# Patient Record
Sex: FEMALE | Race: WHITE | NOT HISPANIC OR LATINO | URBAN - METROPOLITAN AREA
[De-identification: names, ages, dates, MRNs, and addresses within clinical notes are randomized per-mention and may not be internally consistent; named-entity substitution may affect disease eponyms.]

---

## 2017-01-31 ENCOUNTER — OUTPATIENT (OUTPATIENT)
Dept: OUTPATIENT SERVICES | Facility: HOSPITAL | Age: 62
LOS: 1 days | Discharge: HOME | End: 2017-01-31
Payer: COMMERCIAL

## 2017-01-31 DIAGNOSIS — Z98.89 OTHER SPECIFIED POSTPROCEDURAL STATES: Chronic | ICD-10-CM

## 2017-01-31 DIAGNOSIS — M47.812 SPONDYLOSIS WITHOUT MYELOPATHY OR RADICULOPATHY, CERVICAL REGION: ICD-10-CM

## 2017-01-31 DIAGNOSIS — Z96.659 PRESENCE OF UNSPECIFIED ARTIFICIAL KNEE JOINT: Chronic | ICD-10-CM

## 2017-01-31 DIAGNOSIS — L05.91 PILONIDAL CYST WITHOUT ABSCESS: Chronic | ICD-10-CM

## 2017-01-31 PROCEDURE — 76000 FLUOROSCOPY <1 HR PHYS/QHP: CPT

## 2017-01-31 PROCEDURE — 64491 INJ PARAVERT F JNT C/T 2 LEV: CPT | Mod: 50

## 2017-01-31 PROCEDURE — 64492 INJ PARAVERT F JNT C/T 3 LEV: CPT | Mod: 50

## 2017-01-31 PROCEDURE — 64490 INJ PARAVERT F JNT C/T 1 LEV: CPT | Mod: 50

## 2017-03-27 ENCOUNTER — APPOINTMENT (OUTPATIENT)
Dept: DERMATOLOGY | Facility: CLINIC | Age: 62
End: 2017-03-27

## 2017-05-17 ENCOUNTER — APPOINTMENT (OUTPATIENT)
Dept: SURGERY | Facility: CLINIC | Age: 62
End: 2017-05-17

## 2017-05-17 VITALS
OXYGEN SATURATION: 96 % | HEART RATE: 78 BPM | TEMPERATURE: 97.6 F | HEIGHT: 64 IN | RESPIRATION RATE: 16 BRPM | DIASTOLIC BLOOD PRESSURE: 81 MMHG | WEIGHT: 143 LBS | SYSTOLIC BLOOD PRESSURE: 130 MMHG | BODY MASS INDEX: 24.41 KG/M2

## 2017-05-17 DIAGNOSIS — Z83.3 FAMILY HISTORY OF DIABETES MELLITUS: ICD-10-CM

## 2017-05-17 DIAGNOSIS — Z87.39 PERSONAL HISTORY OF OTHER DISEASES OF THE MUSCULOSKELETAL SYSTEM AND CONNECTIVE TISSUE: ICD-10-CM

## 2017-05-17 DIAGNOSIS — Z82.61 FAMILY HISTORY OF ARTHRITIS: ICD-10-CM

## 2017-05-17 DIAGNOSIS — Z82.49 FAMILY HISTORY OF ISCHEMIC HEART DISEASE AND OTHER DISEASES OF THE CIRCULATORY SYSTEM: ICD-10-CM

## 2017-05-19 PROBLEM — Z82.61 FAMILY HISTORY OF ARTHRITIS: Status: ACTIVE | Noted: 2017-05-17

## 2017-05-19 PROBLEM — Z87.39 HISTORY OF ARTHRITIS: Status: RESOLVED | Noted: 2017-05-17 | Resolved: 2017-05-19

## 2017-05-19 PROBLEM — Z82.49 FAMILY HISTORY OF MYOCARDIAL INFARCTION: Status: ACTIVE | Noted: 2017-05-17

## 2017-05-19 PROBLEM — Z82.49 FAMILY HISTORY OF CARDIAC DISORDER: Status: ACTIVE | Noted: 2017-05-17

## 2017-05-19 PROBLEM — Z82.49 FAMILY HISTORY OF HYPERTENSION: Status: ACTIVE | Noted: 2017-05-17

## 2017-05-19 PROBLEM — Z83.3 FAMILY HISTORY OF DIABETES MELLITUS: Status: ACTIVE | Noted: 2017-05-17

## 2017-05-22 ENCOUNTER — APPOINTMENT (OUTPATIENT)
Dept: SURGERY | Facility: CLINIC | Age: 62
End: 2017-05-22

## 2017-06-21 ENCOUNTER — APPOINTMENT (OUTPATIENT)
Dept: SURGERY | Facility: CLINIC | Age: 62
End: 2017-06-21

## 2017-06-21 ENCOUNTER — LABORATORY RESULT (OUTPATIENT)
Age: 62
End: 2017-06-21

## 2017-06-21 ENCOUNTER — RESULT REVIEW (OUTPATIENT)
Age: 62
End: 2017-06-21

## 2017-06-21 VITALS
BODY MASS INDEX: 24.25 KG/M2 | TEMPERATURE: 98.3 F | HEIGHT: 64 IN | OXYGEN SATURATION: 97 % | WEIGHT: 142.03 LBS | DIASTOLIC BLOOD PRESSURE: 78 MMHG | HEART RATE: 78 BPM | RESPIRATION RATE: 16 BRPM | SYSTOLIC BLOOD PRESSURE: 121 MMHG

## 2017-06-22 RX ORDER — ORPHENADRINE CITRATE 100 MG/1
100 TABLET, EXTENDED RELEASE ORAL
Qty: 60 | Refills: 0 | Status: ACTIVE | COMMUNITY
Start: 2016-12-23

## 2017-06-22 RX ORDER — ASPIRIN 325 MG
TABLET ORAL
Refills: 0 | Status: ACTIVE | COMMUNITY

## 2017-06-22 RX ORDER — IBUPROFEN 600 MG/1
600 TABLET, FILM COATED ORAL
Qty: 60 | Refills: 0 | Status: ACTIVE | COMMUNITY
Start: 2017-04-26

## 2017-06-22 RX ORDER — QUETIAPINE FUMARATE 25 MG/1
25 TABLET ORAL
Qty: 120 | Refills: 0 | Status: ACTIVE | COMMUNITY
Start: 2017-02-28

## 2017-06-22 RX ORDER — SIMVASTATIN 20 MG/1
20 TABLET, FILM COATED ORAL
Qty: 90 | Refills: 0 | Status: ACTIVE | COMMUNITY
Start: 2017-05-01

## 2017-06-28 ENCOUNTER — APPOINTMENT (OUTPATIENT)
Dept: SURGERY | Facility: CLINIC | Age: 62
End: 2017-06-28

## 2017-06-28 VITALS
OXYGEN SATURATION: 96 % | TEMPERATURE: 97.9 F | HEIGHT: 64 IN | BODY MASS INDEX: 24.24 KG/M2 | RESPIRATION RATE: 16 BRPM | HEART RATE: 70 BPM | DIASTOLIC BLOOD PRESSURE: 85 MMHG | SYSTOLIC BLOOD PRESSURE: 124 MMHG | WEIGHT: 142 LBS

## 2017-06-28 DIAGNOSIS — L72.0 EPIDERMAL CYST: ICD-10-CM

## 2017-06-29 PROBLEM — L72.0 EPIDERMOID CYST: Status: ACTIVE | Noted: 2017-05-19

## 2017-10-17 ENCOUNTER — OUTPATIENT (OUTPATIENT)
Dept: OUTPATIENT SERVICES | Facility: HOSPITAL | Age: 62
LOS: 1 days | End: 2017-10-17
Payer: COMMERCIAL

## 2017-10-17 DIAGNOSIS — L05.91 PILONIDAL CYST WITHOUT ABSCESS: Chronic | ICD-10-CM

## 2017-10-17 DIAGNOSIS — Z98.89 OTHER SPECIFIED POSTPROCEDURAL STATES: Chronic | ICD-10-CM

## 2017-10-17 DIAGNOSIS — M47.816 SPONDYLOSIS WITHOUT MYELOPATHY OR RADICULOPATHY, LUMBAR REGION: ICD-10-CM

## 2017-10-17 DIAGNOSIS — Z96.659 PRESENCE OF UNSPECIFIED ARTIFICIAL KNEE JOINT: Chronic | ICD-10-CM

## 2017-10-17 PROCEDURE — 76000 FLUOROSCOPY <1 HR PHYS/QHP: CPT

## 2017-10-17 PROCEDURE — 64493 INJ PARAVERT F JNT L/S 1 LEV: CPT | Mod: 50

## 2017-10-17 PROCEDURE — 64495 INJ PARAVERT F JNT L/S 3 LEV: CPT | Mod: 50

## 2017-10-17 PROCEDURE — 64494 INJ PARAVERT F JNT L/S 2 LEV: CPT | Mod: 50

## 2017-10-18 ENCOUNTER — TRANSCRIPTION ENCOUNTER (OUTPATIENT)
Age: 62
End: 2017-10-18

## 2017-11-16 ENCOUNTER — INPATIENT (INPATIENT)
Facility: HOSPITAL | Age: 62
LOS: 0 days | Discharge: HOME | DRG: 287 | End: 2017-11-17
Attending: INTERNAL MEDICINE | Admitting: HOSPITALIST
Payer: MEDICARE

## 2017-11-16 VITALS
SYSTOLIC BLOOD PRESSURE: 137 MMHG | OXYGEN SATURATION: 97 % | RESPIRATION RATE: 20 BRPM | WEIGHT: 138.01 LBS | HEART RATE: 84 BPM | DIASTOLIC BLOOD PRESSURE: 84 MMHG | TEMPERATURE: 98 F | HEIGHT: 64 IN

## 2017-11-16 DIAGNOSIS — I24.9 ACUTE ISCHEMIC HEART DISEASE, UNSPECIFIED: ICD-10-CM

## 2017-11-16 DIAGNOSIS — Z98.89 OTHER SPECIFIED POSTPROCEDURAL STATES: Chronic | ICD-10-CM

## 2017-11-16 DIAGNOSIS — I20.0 UNSTABLE ANGINA: ICD-10-CM

## 2017-11-16 DIAGNOSIS — Z96.659 PRESENCE OF UNSPECIFIED ARTIFICIAL KNEE JOINT: Chronic | ICD-10-CM

## 2017-11-16 DIAGNOSIS — L05.91 PILONIDAL CYST WITHOUT ABSCESS: Chronic | ICD-10-CM

## 2017-11-16 LAB
ALBUMIN SERPL ELPH-MCNC: 4.7 G/DL — SIGNIFICANT CHANGE UP (ref 3.3–5.2)
ALP SERPL-CCNC: 85 U/L — SIGNIFICANT CHANGE UP (ref 40–120)
ALT FLD-CCNC: 12 U/L — SIGNIFICANT CHANGE UP
ANION GAP SERPL CALC-SCNC: 12 MMOL/L — SIGNIFICANT CHANGE UP (ref 5–17)
AST SERPL-CCNC: 20 U/L — SIGNIFICANT CHANGE UP
BASOPHILS # BLD AUTO: 0 K/UL — SIGNIFICANT CHANGE UP (ref 0–0.2)
BASOPHILS NFR BLD AUTO: 0.3 % — SIGNIFICANT CHANGE UP (ref 0–2)
BILIRUB SERPL-MCNC: 0.2 MG/DL — LOW (ref 0.4–2)
BUN SERPL-MCNC: 23 MG/DL — HIGH (ref 8–20)
CALCIUM SERPL-MCNC: 9.6 MG/DL — SIGNIFICANT CHANGE UP (ref 8.6–10.2)
CHLORIDE SERPL-SCNC: 102 MMOL/L — SIGNIFICANT CHANGE UP (ref 98–107)
CK SERPL-CCNC: 92 U/L — SIGNIFICANT CHANGE UP (ref 25–170)
CO2 SERPL-SCNC: 25 MMOL/L — SIGNIFICANT CHANGE UP (ref 22–29)
CREAT SERPL-MCNC: 0.93 MG/DL — SIGNIFICANT CHANGE UP (ref 0.5–1.3)
D DIMER BLD IA.RAPID-MCNC: <150 NG/ML DDU — SIGNIFICANT CHANGE UP
EOSINOPHIL # BLD AUTO: 0.1 K/UL — SIGNIFICANT CHANGE UP (ref 0–0.5)
EOSINOPHIL NFR BLD AUTO: 1.5 % — SIGNIFICANT CHANGE UP (ref 0–6)
GLUCOSE SERPL-MCNC: 156 MG/DL — HIGH (ref 70–115)
HCT VFR BLD CALC: 39.4 % — SIGNIFICANT CHANGE UP (ref 37–47)
HGB BLD-MCNC: 13.3 G/DL — SIGNIFICANT CHANGE UP (ref 12–16)
LYMPHOCYTES # BLD AUTO: 2.7 K/UL — SIGNIFICANT CHANGE UP (ref 1–4.8)
LYMPHOCYTES # BLD AUTO: 41.3 % — SIGNIFICANT CHANGE UP (ref 20–55)
MCHC RBC-ENTMCNC: 31.4 PG — HIGH (ref 27–31)
MCHC RBC-ENTMCNC: 33.8 G/DL — SIGNIFICANT CHANGE UP (ref 32–36)
MCV RBC AUTO: 92.9 FL — SIGNIFICANT CHANGE UP (ref 81–99)
MONOCYTES # BLD AUTO: 0.3 K/UL — SIGNIFICANT CHANGE UP (ref 0–0.8)
MONOCYTES NFR BLD AUTO: 4.9 % — SIGNIFICANT CHANGE UP (ref 3–10)
NEUTROPHILS # BLD AUTO: 3.4 K/UL — SIGNIFICANT CHANGE UP (ref 1.8–8)
NEUTROPHILS NFR BLD AUTO: 51.8 % — SIGNIFICANT CHANGE UP (ref 37–73)
NT-PROBNP SERPL-SCNC: <5 PG/ML — SIGNIFICANT CHANGE UP (ref 0–300)
PLATELET # BLD AUTO: 209 K/UL — SIGNIFICANT CHANGE UP (ref 150–400)
POTASSIUM SERPL-MCNC: 4.3 MMOL/L — SIGNIFICANT CHANGE UP (ref 3.5–5.3)
POTASSIUM SERPL-SCNC: 4.3 MMOL/L — SIGNIFICANT CHANGE UP (ref 3.5–5.3)
PROT SERPL-MCNC: 7.2 G/DL — SIGNIFICANT CHANGE UP (ref 6.6–8.7)
RBC # BLD: 4.24 M/UL — LOW (ref 4.4–5.2)
RBC # FLD: 13.1 % — SIGNIFICANT CHANGE UP (ref 11–15.6)
SODIUM SERPL-SCNC: 139 MMOL/L — SIGNIFICANT CHANGE UP (ref 135–145)
TROPONIN T SERPL-MCNC: <0.01 NG/ML — SIGNIFICANT CHANGE UP (ref 0–0.06)
TROPONIN T SERPL-MCNC: <0.01 NG/ML — SIGNIFICANT CHANGE UP (ref 0–0.06)
WBC # BLD: 6.5 K/UL — SIGNIFICANT CHANGE UP (ref 4.8–10.8)
WBC # FLD AUTO: 6.5 K/UL — SIGNIFICANT CHANGE UP (ref 4.8–10.8)

## 2017-11-16 PROCEDURE — 71010: CPT | Mod: 26

## 2017-11-16 PROCEDURE — 99223 1ST HOSP IP/OBS HIGH 75: CPT | Mod: GC

## 2017-11-16 PROCEDURE — 93010 ELECTROCARDIOGRAM REPORT: CPT

## 2017-11-16 PROCEDURE — 99285 EMERGENCY DEPT VISIT HI MDM: CPT

## 2017-11-16 RX ORDER — ASPIRIN/CALCIUM CARB/MAGNESIUM 324 MG
325 TABLET ORAL DAILY
Qty: 0 | Refills: 0 | Status: DISCONTINUED | OUTPATIENT
Start: 2017-11-16 | End: 2017-11-17

## 2017-11-16 RX ORDER — SODIUM CHLORIDE 9 MG/ML
3 INJECTION INTRAMUSCULAR; INTRAVENOUS; SUBCUTANEOUS ONCE
Qty: 0 | Refills: 0 | Status: COMPLETED | OUTPATIENT
Start: 2017-11-16 | End: 2017-11-16

## 2017-11-16 RX ORDER — ATORVASTATIN CALCIUM 80 MG/1
80 TABLET, FILM COATED ORAL AT BEDTIME
Qty: 0 | Refills: 0 | Status: DISCONTINUED | OUTPATIENT
Start: 2017-11-16 | End: 2017-11-17

## 2017-11-16 RX ADMIN — Medication 325 MILLIGRAM(S): at 22:49

## 2017-11-16 RX ADMIN — ATORVASTATIN CALCIUM 80 MILLIGRAM(S): 80 TABLET, FILM COATED ORAL at 22:50

## 2017-11-16 RX ADMIN — SODIUM CHLORIDE 3 MILLILITER(S): 9 INJECTION INTRAMUSCULAR; INTRAVENOUS; SUBCUTANEOUS at 17:15

## 2017-11-16 NOTE — H&P ADULT - HISTORY OF PRESENT ILLNESS
63yo F w/PMH HLD, Anxiety, TIA, FH CAD Presents w/abnormal EKG, SOB and Palpitations. Pt has had palpitations and SOB for at least 2 years 63yo F w/PMHx HLD, Anxiety, questionable TIA, Bell's palsy, chronic pain, DJD awaiting elective C-spine fusion surgery, s/p TKR  Presents w/abnormal EKG, SOB and Palpitations. Pt has had palpitations and SOB for at least 2 years 61yo F w/PMHx HLD, Anxiety, questionable TIA, Bell's palsy, chronic pain, DJD awaiting elective C-spine fusion surgery, s/p TKR  Presents w/abnormal EKG, SOB and Palpitations. Pt has had palpitations and SOB for at least 2 years. Two weeks ago she began to notice a worsening of her baseline SOB. She has palpitations every morning which is relieved with Xanax, and has shortness of breath while at rest and with exertion. She also has a pain in her jaw and arms when having the palpitation. She had had a nuclear stress test planned a year previous with Dr. Alarcon, but did not go. She has also worn halter monitor two times in the past without finding anything significant, and a US Carotid w/ Dr. Julius Schmitt. 63yo F w/PMHx HLD, Anxiety, questionable TIA, Bell's palsy, chronic pain, DJD awaiting elective C-spine fusion surgery, s/p TKR  Presents w/abnormal EKG, SOB and Palpitations. Pt has had palpitations and SOB for at least 2 years. Two weeks ago she began to notice a worsening of her baseline SOB. She has palpitations every morning which is relieved with Xanax, and has shortness of breath while at rest and with exertion. She also has a pain in her jaw and arms when having the palpitation. She had had a nuclear stress test planned a year previous with Dr. Alarcon, but did not go. She has also worn halter monitor two times in the past without finding anything significant, and a US Carotid w/ Dr. Julius Schmitt. She is scheduled for follow up with Dr. Alarcon out patient. Pt has back and neck pain. She has a cervical spine fusion scheduled for early December. Pt denies nausea, abdominal pain, diarrhea, urinary incontinence, dysuria, leg swelling, rashes.

## 2017-11-16 NOTE — ED PROVIDER NOTE - PSH
H/O total knee replacement  2015  Pilonidal cyst    S/P dilatation and curettage  x2  S/P knee surgery  right x5

## 2017-11-16 NOTE — H&P ADULT - PROBLEM SELECTOR PLAN 1
Trops were negative x 2, CK negative x 1.  Her cardiologist was contacted and he compared her EKG with previous ones and recommends discharge and follow up outpatient.  Increase dose of statin  F/U AM Trop, CK, EKG

## 2017-11-16 NOTE — ED PROVIDER NOTE - OBJECTIVE STATEMENT
61 y/o female with h/o hyperlipidemia and fh cad states she was well until about 2 weeks ago she noted an increase in a baseline sob and she feels sob at rest and more with minimal exertion no fever or chills and no recent immobilization pt says she went to pmd and told ecg abnl and she needs a stress test and she also says when she went to her cardiologist Dr Alarcon 1 year ago he wanted her to have a stress echo but she did not go and she called him and he told her to come in as she needs a stress echo no cp now pt still smokes 4 cigarettes a day

## 2017-11-16 NOTE — H&P ADULT - NSHPPHYSICALEXAM_GEN_ALL_CORE
Vital Signs Last 24 Hrs  T(C): 36.4 (17 Nov 2017 01:44), Max: 36.5 (16 Nov 2017 15:13)  T(F): 97.6 (17 Nov 2017 01:44), Max: 97.7 (16 Nov 2017 15:13)  HR: 76 (17 Nov 2017 01:44) (76 - 84)  BP: 125/59 (17 Nov 2017 01:44) (125/59 - 137/84)  BP(mean): --  RR: 21 (17 Nov 2017 01:44) (20 - 21)  SpO2: 97% (17 Nov 2017 01:44) (97% - 97%)    GENERAL: NAD, well-groomed, well-developed  HEAD:  Atraumatic, Normocephalic  EYES: EOMI, PERRLA, conjunctiva and sclera clear  ENMT: No tonsillar erythema, exudates, or enlargement; Moist mucous membranes, Good dentition, No lesions  NECK: Tender to palpation, No JVD, Normal thyroid  RESPIRATORY: Clear to auscultation bilaterally; No rales, rhonchi, wheezing, or rubs  CARDIOVASCULAR: Regular rate; No murmurs, rubs, or gallops  GI: Soft, Nontender, Nondistended; Bowel sounds present  EXTREMITIES:  2+ Peripheral Pulses, No clubbing, cyanosis, or edema, FROM  LYMPH: No cervical, submandibular, supraclavicular, axial or femoral lymphadenopathy noted  NERVOUS SYSTEM:  Alert & Oriented X3, Good concentration; Motor Strength 5/5 B/L upper and lower extremities; DTRs 2+ intact and symmetric  SKIN: No suspicious rashes or lesions noted Vital Signs Last 24 Hrs  T(C): 36.4 (17 Nov 2017 01:44), Max: 36.5 (16 Nov 2017 15:13)  T(F): 97.6 (17 Nov 2017 01:44), Max: 97.7 (16 Nov 2017 15:13)  HR: 76 (17 Nov 2017 01:44) (76 - 84)  BP: 125/59 (17 Nov 2017 01:44) (125/59 - 137/84)  BP(mean): --  RR: 21 (17 Nov 2017 01:44) (20 - 21)  SpO2: 97% (17 Nov 2017 01:44) (97% - 97%)    GENERAL: NAD, well-groomed, well-developed  HEAD:  Atraumatic, Normocephalic  EYES: EOMI, PERRLA, conjunctiva and sclera clear  ENMT: No tonsillar erythema, exudates, or enlargement; Moist mucous membranes, Good dentition, No lesions  NECK: Tender to palpation, No JVD, Normal thyroid  RESPIRATORY: Clear to auscultation bilaterally; No rales, rhonchi, wheezing, or rubs  CARDIOVASCULAR: Regular rate; No murmurs, rubs, or gallops  GI: Soft, Nontender, Nondistended; Bowel sounds present  EXTREMITIES:  FROM, 2+ Peripheral Pulses, No clubbing, cyanosis, or edema  LYMPH: No cervical, submandibular, supraclavicular, axial or femoral lymphadenopathy noted  NERVOUS SYSTEM:  Alert & Oriented X3, Good concentration; Motor Strength 5/5 B/L upper and lower extremities; DTRs 2+ intact and symmetric  SKIN: No suspicious rashes or lesions noted
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
## 2017-11-16 NOTE — H&P ADULT - NSHPSOCIALHISTORY_GEN_ALL_CORE
Pt is current smoker, 3-4 cigarettes a day. 30 pack years She drinks 3 drinks every Friday (CAGE negative), She denies illicit drugs.

## 2017-11-16 NOTE — H&P ADULT - ASSESSMENT
r/o ACS 63yo F w/PMHx HLD, Anxiety, questionable TIA, Bell's palsy, chronic pain, DJD awaiting elective C-spine fusion surgery, s/p TKR.  Presents w/abnormal EKG, SOB and Palpitations. Pt has had palpitations and SOB for at least 2 years. Two weeks ago she began to notice a worsening of her baseline SOB. She has palpitations every morning which is relieved with Xanax, and has shortness of breath while at rest and with exertion. She also has a pain in her jaw and arms when having the palpitation. Admitted for R/O ACS.

## 2017-11-16 NOTE — H&P ADULT - FAMILY HISTORY
Father  Still living? No  Family history of coronary artery disease, Age at diagnosis: 61-70     Sibling  Still living? Yes, Estimated age: 51-60  Family history of diabetes mellitus, Age at diagnosis: 51-60     Uncle  Still living? Unknown  Family history of diabetes mellitus, Age at diagnosis: Age Unknown     Sibling  Still living? Yes, Estimated age: 51-60  Family history of diabetes mellitus, Age at diagnosis: 51-60

## 2017-11-17 ENCOUNTER — TRANSCRIPTION ENCOUNTER (OUTPATIENT)
Age: 62
End: 2017-11-17

## 2017-11-17 VITALS
SYSTOLIC BLOOD PRESSURE: 131 MMHG | RESPIRATION RATE: 18 BRPM | HEART RATE: 69 BPM | DIASTOLIC BLOOD PRESSURE: 61 MMHG | OXYGEN SATURATION: 99 %

## 2017-11-17 DIAGNOSIS — Z29.9 ENCOUNTER FOR PROPHYLACTIC MEASURES, UNSPECIFIED: ICD-10-CM

## 2017-11-17 DIAGNOSIS — E86.0 DEHYDRATION: ICD-10-CM

## 2017-11-17 DIAGNOSIS — M54.2 CERVICALGIA: ICD-10-CM

## 2017-11-17 DIAGNOSIS — F41.9 ANXIETY DISORDER, UNSPECIFIED: ICD-10-CM

## 2017-11-17 DIAGNOSIS — E78.5 HYPERLIPIDEMIA, UNSPECIFIED: ICD-10-CM

## 2017-11-17 DIAGNOSIS — M54.5 LOW BACK PAIN: ICD-10-CM

## 2017-11-17 DIAGNOSIS — R73.9 HYPERGLYCEMIA, UNSPECIFIED: ICD-10-CM

## 2017-11-17 DIAGNOSIS — I24.9 ACUTE ISCHEMIC HEART DISEASE, UNSPECIFIED: ICD-10-CM

## 2017-11-17 LAB
ALBUMIN SERPL ELPH-MCNC: 4.1 G/DL — SIGNIFICANT CHANGE UP (ref 3.3–5.2)
ALP SERPL-CCNC: 84 U/L — SIGNIFICANT CHANGE UP (ref 40–120)
ALT FLD-CCNC: 10 U/L — SIGNIFICANT CHANGE UP
ANION GAP SERPL CALC-SCNC: 14 MMOL/L — SIGNIFICANT CHANGE UP (ref 5–17)
AST SERPL-CCNC: 17 U/L — SIGNIFICANT CHANGE UP
BASOPHILS # BLD AUTO: 0 K/UL — SIGNIFICANT CHANGE UP (ref 0–0.2)
BASOPHILS NFR BLD AUTO: 0.5 % — SIGNIFICANT CHANGE UP (ref 0–2)
BILIRUB SERPL-MCNC: 0.3 MG/DL — LOW (ref 0.4–2)
BUN SERPL-MCNC: 20 MG/DL — SIGNIFICANT CHANGE UP (ref 8–20)
CALCIUM SERPL-MCNC: 9.2 MG/DL — SIGNIFICANT CHANGE UP (ref 8.6–10.2)
CHLORIDE SERPL-SCNC: 105 MMOL/L — SIGNIFICANT CHANGE UP (ref 98–107)
CHOLEST SERPL-MCNC: 162 MG/DL — SIGNIFICANT CHANGE UP (ref 110–199)
CK SERPL-CCNC: 75 U/L — SIGNIFICANT CHANGE UP (ref 25–170)
CO2 SERPL-SCNC: 23 MMOL/L — SIGNIFICANT CHANGE UP (ref 22–29)
CREAT SERPL-MCNC: 0.79 MG/DL — SIGNIFICANT CHANGE UP (ref 0.5–1.3)
EOSINOPHIL # BLD AUTO: 0.1 K/UL — SIGNIFICANT CHANGE UP (ref 0–0.5)
EOSINOPHIL NFR BLD AUTO: 2.1 % — SIGNIFICANT CHANGE UP (ref 0–6)
GLUCOSE SERPL-MCNC: 107 MG/DL — SIGNIFICANT CHANGE UP (ref 70–115)
HBA1C BLD-MCNC: 5.2 % — SIGNIFICANT CHANGE UP (ref 4–5.6)
HCT VFR BLD CALC: 39.3 % — SIGNIFICANT CHANGE UP (ref 37–47)
HDLC SERPL-MCNC: 55 MG/DL — LOW
HGB BLD-MCNC: 13.2 G/DL — SIGNIFICANT CHANGE UP (ref 12–16)
LIPID PNL WITH DIRECT LDL SERPL: 69 MG/DL — SIGNIFICANT CHANGE UP
LYMPHOCYTES # BLD AUTO: 2.9 K/UL — SIGNIFICANT CHANGE UP (ref 1–4.8)
LYMPHOCYTES # BLD AUTO: 49.7 % — SIGNIFICANT CHANGE UP (ref 20–55)
MAGNESIUM SERPL-MCNC: 2.3 MG/DL — SIGNIFICANT CHANGE UP (ref 1.6–2.6)
MCHC RBC-ENTMCNC: 31.4 PG — HIGH (ref 27–31)
MCHC RBC-ENTMCNC: 33.6 G/DL — SIGNIFICANT CHANGE UP (ref 32–36)
MCV RBC AUTO: 93.6 FL — SIGNIFICANT CHANGE UP (ref 81–99)
MONOCYTES # BLD AUTO: 0.3 K/UL — SIGNIFICANT CHANGE UP (ref 0–0.8)
MONOCYTES NFR BLD AUTO: 4.8 % — SIGNIFICANT CHANGE UP (ref 3–10)
NEUTROPHILS # BLD AUTO: 2.5 K/UL — SIGNIFICANT CHANGE UP (ref 1.8–8)
NEUTROPHILS NFR BLD AUTO: 42.6 % — SIGNIFICANT CHANGE UP (ref 37–73)
PHOSPHATE SERPL-MCNC: 4.2 MG/DL — SIGNIFICANT CHANGE UP (ref 2.4–4.7)
PLATELET # BLD AUTO: 196 K/UL — SIGNIFICANT CHANGE UP (ref 150–400)
POTASSIUM SERPL-MCNC: 4.1 MMOL/L — SIGNIFICANT CHANGE UP (ref 3.5–5.3)
POTASSIUM SERPL-SCNC: 4.1 MMOL/L — SIGNIFICANT CHANGE UP (ref 3.5–5.3)
PROT SERPL-MCNC: 6.7 G/DL — SIGNIFICANT CHANGE UP (ref 6.6–8.7)
RBC # BLD: 4.2 M/UL — LOW (ref 4.4–5.2)
RBC # FLD: 13.3 % — SIGNIFICANT CHANGE UP (ref 11–15.6)
SODIUM SERPL-SCNC: 142 MMOL/L — SIGNIFICANT CHANGE UP (ref 135–145)
TOTAL CHOLESTEROL/HDL RATIO MEASUREMENT: 3 RATIO — LOW (ref 3.3–7.1)
TRIGL SERPL-MCNC: 189 MG/DL — SIGNIFICANT CHANGE UP (ref 10–200)
TROPONIN T SERPL-MCNC: <0.01 NG/ML — SIGNIFICANT CHANGE UP (ref 0–0.06)
WBC # BLD: 5.8 K/UL — SIGNIFICANT CHANGE UP (ref 4.8–10.8)
WBC # FLD AUTO: 5.8 K/UL — SIGNIFICANT CHANGE UP (ref 4.8–10.8)

## 2017-11-17 PROCEDURE — 84484 ASSAY OF TROPONIN QUANT: CPT

## 2017-11-17 PROCEDURE — 80061 LIPID PANEL: CPT

## 2017-11-17 PROCEDURE — 80053 COMPREHEN METABOLIC PANEL: CPT

## 2017-11-17 PROCEDURE — C1887: CPT

## 2017-11-17 PROCEDURE — 83036 HEMOGLOBIN GLYCOSYLATED A1C: CPT

## 2017-11-17 PROCEDURE — 93005 ELECTROCARDIOGRAM TRACING: CPT

## 2017-11-17 PROCEDURE — 93458 L HRT ARTERY/VENTRICLE ANGIO: CPT

## 2017-11-17 PROCEDURE — C1769: CPT

## 2017-11-17 PROCEDURE — C1894: CPT

## 2017-11-17 PROCEDURE — 71045 X-RAY EXAM CHEST 1 VIEW: CPT

## 2017-11-17 PROCEDURE — 83735 ASSAY OF MAGNESIUM: CPT

## 2017-11-17 PROCEDURE — 99239 HOSP IP/OBS DSCHRG MGMT >30: CPT

## 2017-11-17 PROCEDURE — 85379 FIBRIN DEGRADATION QUANT: CPT

## 2017-11-17 PROCEDURE — 85027 COMPLETE CBC AUTOMATED: CPT

## 2017-11-17 PROCEDURE — 36415 COLL VENOUS BLD VENIPUNCTURE: CPT

## 2017-11-17 PROCEDURE — 83880 ASSAY OF NATRIURETIC PEPTIDE: CPT

## 2017-11-17 PROCEDURE — 84100 ASSAY OF PHOSPHORUS: CPT

## 2017-11-17 PROCEDURE — 99285 EMERGENCY DEPT VISIT HI MDM: CPT | Mod: 25

## 2017-11-17 PROCEDURE — 82550 ASSAY OF CK (CPK): CPT

## 2017-11-17 PROCEDURE — 99152 MOD SED SAME PHYS/QHP 5/>YRS: CPT

## 2017-11-17 PROCEDURE — 93458 L HRT ARTERY/VENTRICLE ANGIO: CPT | Mod: 26

## 2017-11-17 RX ORDER — SENNA PLUS 8.6 MG/1
2 TABLET ORAL AT BEDTIME
Qty: 0 | Refills: 0 | Status: DISCONTINUED | OUTPATIENT
Start: 2017-11-17 | End: 2017-11-17

## 2017-11-17 RX ORDER — IBUPROFEN 200 MG
600 TABLET ORAL EVERY 6 HOURS
Qty: 0 | Refills: 0 | Status: DISCONTINUED | OUTPATIENT
Start: 2017-11-17 | End: 2017-11-17

## 2017-11-17 RX ORDER — DOCUSATE SODIUM 100 MG
100 CAPSULE ORAL DAILY
Qty: 0 | Refills: 0 | Status: DISCONTINUED | OUTPATIENT
Start: 2017-11-17 | End: 2017-11-17

## 2017-11-17 RX ORDER — ALPRAZOLAM 0.25 MG
0.25 TABLET ORAL EVERY 8 HOURS
Qty: 0 | Refills: 0 | Status: DISCONTINUED | OUTPATIENT
Start: 2017-11-17 | End: 2017-11-17

## 2017-11-17 RX ORDER — OXYCODONE HYDROCHLORIDE 5 MG/1
15 TABLET ORAL EVERY 4 HOURS
Qty: 0 | Refills: 0 | Status: DISCONTINUED | OUTPATIENT
Start: 2017-11-17 | End: 2017-11-17

## 2017-11-17 RX ORDER — NICOTINE POLACRILEX 2 MG
1 GUM BUCCAL DAILY
Qty: 0 | Refills: 0 | Status: DISCONTINUED | OUTPATIENT
Start: 2017-11-17 | End: 2017-11-17

## 2017-11-17 RX ORDER — QUETIAPINE FUMARATE 200 MG/1
100 TABLET, FILM COATED ORAL AT BEDTIME
Qty: 0 | Refills: 0 | Status: DISCONTINUED | OUTPATIENT
Start: 2017-11-17 | End: 2017-11-17

## 2017-11-17 RX ADMIN — Medication 325 MILLIGRAM(S): at 09:57

## 2017-11-17 RX ADMIN — Medication 1 PATCH: at 09:57

## 2017-11-17 RX ADMIN — Medication 0.25 MILLIGRAM(S): at 05:56
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
## 2017-11-17 NOTE — DISCHARGE NOTE ADULT - MEDICATION SUMMARY - MEDICATIONS TO TAKE
I will START or STAY ON the medications listed below when I get home from the hospital:    oxyCODONE 15 mg oral tablet  -- 1 tab(s) by mouth every 3 hours, As needed, Severe Pain  -- Indication: For Pain    Aspir 81 81 mg oral tablet  -- 1 tab(s) by mouth 2 times a day, for 28 days total  -- Indication: For Heart    Motrin 800 mg oral tablet  -- 1 tab(s) by mouth 3 times a day, As Needed  -- Indication: For Pain    simvastatin 40 mg oral tablet  -- 1 tab(s) by mouth once a day (at bedtime)  -- Indication: For Chol    SEROquel 25 mg oral tablet  -- 4 tab(s) by mouth once a day (at bedtime)  -- Indication: For sleep    Xanax  -- 0.25 mg PO tid PRN anxiety  -- Indication: For Anxiety

## 2017-11-17 NOTE — DISCHARGE NOTE ADULT - CARE PROVIDERS DIRECT ADDRESSES
,DirectAddress_Unknown ,DirectAddress_Unknown,[email or web address]

## 2017-11-17 NOTE — DISCHARGE NOTE ADULT - OTHER SIGNIFICANT FINDINGS
Troponin T, Serum: <0.01ng/mL (11.17.17 @ 06:23)  Troponin T, Serum: <0.01 ng/mL (11.16.17 @ 23:13)  Troponin T, Serum: <0.01: Reference Interval for Troponin T  Less than 0.06 ng/mL - includes the 99th percentile of a healthy  population at a method C.V. of 10% or less.  NOTE: Troponin T is measured by the Roche CLIA method and these  results are not interchangable with Troponin I results since they are  different assays with different reference intervals. ng/mL (11.16.17 @ 17:30) Troponin T, Serum: <0.01 ng/mL (11.17.17 @ 06:23)  Troponin T, Serum: <0.01 ng/mL (11.16.17 @ 23:13)  Troponin T, Serum: <0.01 ng/mL (11.16.17 @ 17:30)  Lipid Profile in AM (11.17.17 @ 06:23)    Total Cholesterol/HDL Ratio Measurement: 3.0 Ratio    Cholesterol, Serum: 162 mg/dL    Triglycerides, Serum: 189 mg/dL    HDL Cholesterol, Serum: 55 mg/dL    Direct LDL: 69 mg/dL  Creatine Kinase, Serum: 75 U/L (11.17.17 @ 06:23)  Serum Pro-Brain Natriuretic Peptide: <5 pg/mL (11.16.17 @ 17:30)  D-Dimer Assay, Quantitative: <150 ng/mL DDU (11.16.17 @ 17:30)    < from: Xray Chest 1 View AP/PA. (11.16.17 @ 17:43) >  FINDINGS:  Single frontal view of the chest demonstrates the lungs to be clear. The   cardiomediastinal silhouette is normal. No acute osseous abnormalities.   Overlying EKG leads and wires are noted. Atrial septal closure device is   noted.    IMPRESSION: No acute cardiopulmonary disease process.  < end of copied text >

## 2017-11-17 NOTE — CONSULT NOTE ADULT - ASSESSMENT
Assessment and Plan:   · Assessment		  63yo F w/PMHx HLD, Hx of PFO and closure; Strong FH for early CAD with addtl risk factors-plus Anxiety, questionable TIA, Bell's palsy, chronic pain, DJD awaiting elective C-spine fusion surgery, s/p TKR.  Presents w/abnormal EKG, SOB and Palpitations. Pt has had palpitations and SOB for at least 2 years. Two weeks ago she began to notice a worsening of her baseline SOB. She has palpitations every morning which is relieved with Xanax, and has shortness of breath while at rest and with exertion. She also has a pain in her jaw and arms when having the palpitation. Admitted for R/O ACS.     PLAN:  1.  Discussed recommendations for staying in hospital and undergoing cardiac catheterization today.  2.  Pt. agreeable to undergo cath.  3.  Discussed case w/ Dr Hernández today.
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
## 2017-11-17 NOTE — DISCHARGE NOTE ADULT - CARE PLAN
Principal Discharge DX:	Atypical chest pain  Goal:	smoking cessation, optimize cardiac health  Instructions for follow-up, activity and diet:	Restricted use with no heavy lifting of affected arm for 48 hours.  No submerging the arm in water for 48 hours.  You may start showering today.  Call your doctor for any bleeding, swelling, loss of sensation in the hand or fingers, or fingers turning blue.  If heavy bleeding or large lumps form, hold pressure at the spot and come to the Emergency [facility]  [place] Principal Discharge DX:	Atypical chest pain  Goal:	smoking cessation, optimize cardiac health  Instructions for follow-up, activity and diet:	Restricted use with no heavy lifting of affected arm for 48 hours.  No submerging the arm in water for 48 hours.  You may start showering today.  Call your doctor for any bleeding, swelling, loss of sensation in the hand or fingers, or fingers turning blue.  If heavy bleeding or large lumps form, hold pressure at the spot and come to the Emergency Room.  Follow up with cardiology as an outpatient and also with pulmonology to rule out a lung origin of your shortness of breath given your history of smoking.  Continue taking cholesterol medication.  Secondary Diagnosis:	Chronic back pain  Instructions for follow-up, activity and diet:	Continue taking home medications Oxycodone and Ibuprofen.  Secondary Diagnosis:	Chronic neck pain  Instructions for follow-up, activity and diet:	Continue taking home medications Oxycodone and Ibuprofen. Follow up with your appointments for the upcoming procedure.  Secondary Diagnosis:	Anxiety  Instructions for follow-up, activity and diet:	Continue taking your medications: Seroquel and Xanax and follow up with your outpatient provider. Principal Discharge DX:	Atypical chest pain  Goal:	Found to have Nonobstructive Coronary Artery Disease. smoking cessation, optimize cardiac health  Instructions for follow-up, activity and diet:	Restricted use with no heavy lifting of affected arm for 48 hours.  No submerging the arm in water for 48 hours.  You may start showering today.  Call your doctor for any bleeding, swelling, loss of sensation in the hand or fingers, or fingers turning blue.  If heavy bleeding or large lumps form, hold pressure at the spot and come to the Emergency Room.  Follow up with cardiology as an outpatient and also with pulmonology to rule out a lung origin of your shortness of breath given your history of smoking.  Continue taking cholesterol medication.  Secondary Diagnosis:	Chronic back pain  Instructions for follow-up, activity and diet:	Continue taking home medications Oxycodone and Ibuprofen.  Secondary Diagnosis:	Chronic neck pain  Instructions for follow-up, activity and diet:	Continue taking home medications Oxycodone and Ibuprofen. Follow up with your appointments for the upcoming procedure.  Secondary Diagnosis:	Anxiety  Instructions for follow-up, activity and diet:	Continue taking your medications: Seroquel and Xanax and follow up with your outpatient provider.  Secondary Diagnosis:	SOB (shortness of breath)  Goal:	resolution  Instructions for follow-up, activity and diet:	Believe related to underlying pulmonary disease. Please establish care with a Pulmonologist for examination.

## 2017-11-17 NOTE — PROGRESS NOTE ADULT - ATTENDING COMMENTS
Patient seen and examined at the bedside. Agree with the above history, physical, assessment, and plan with the necessary amendments/elaborations below:    ASCVD 7.5%, elevate risk of cardiac disease presenting with atypical symptoms. SP angio showing only mild non obstructive cad. Advised pt to continue statin therapy. Believe her SOB may be related to undiagnosed underlying pulm condition such as COPD, used inhaler in the past for a short time but stopped on her own. Never dx formerly with spirometry of PFT treatment. Will give albuterol and refer to pulm.

## 2017-11-17 NOTE — PROGRESS NOTE ADULT - PROBLEM SELECTOR PLAN 1
Trops were negative x 3, CK negative x 2.  Her cardiologist was contacted and he compared her EKG with previous ones and recommends discharge and follow up outpatient.  C/W statin  F/U AM EKG

## 2017-11-17 NOTE — DISCHARGE NOTE ADULT - HOSPITAL COURSE
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  Frequently associated with palpitations in chest. Pt does have hx of abnormal ECG, PFO requiring closure device 12 yrs ago. She was recommended to have a stress test in the past but never went for it.  She states that over the past few week her symptoms have precipitously worsened.  She is also facing cervical laminectomy surgery next month in December.    63yo F w/PMHx HLD, Hx of PFO and closure; Strong FH for early CAD with addtl risk factors-plus Anxiety, questionable TIA, Bell's palsy, chronic pain, DJD awaiting elective C-spine fusion surgery, s/p TKR.  Presents w/abnormal EKG, SOB and Palpitations. Pt has had palpitations and SOB for at least 2 years. Two weeks ago she began to notice a worsening of her baseline SOB. She has palpitations every morning which is relieved with Xanax, and has shortness of breath while at rest and with exertion. She also has a pain in her jaw and arms when having the palpitation. Admitted for R/O ACS.     62y  Female seen at bedside. Pt was able to get three hours of sleep having taken her Xanax. Informed patient that Dr. Alarcon wanted her to follow up outpt. Pt was disappointed that she couldn't get the stress test in the hospital so she could have her Cervical vertebrate fusion as planned on Dec 1. She has appointment with him on Dec 28, so she won't be ready in time. She is out of breath at rest and feels the chest bumps (palpitations).  63yo F w/PMHx HLD, anxiety, questionable TIA, Bell's palsy, chronic pain, DJD awaiting elective C-spine fusion surgery, s/p total knee replacement. Pt has had palpitations and SOB for at least 2 years. She began to notice a worsening of her baseline SOB two weeks before admission. Presented w/abnormal EKG, SOB and Palpitations to the ED. She has palpitations every morning which is relieved with Xanax, and has shortness of breath while at rest and with exertion. She also has a pain in her jaw and arms when having the palpitation. Admitted for R/O ACS given her history of smoking and strong FH of early CAD. Negative troponins x3, D-Dimer and serum BNP, mild CAD on cardiac catheterization. Patient has remained hemodynamically stable and tolerating PO intake, ready to be discharged home. 63yo F w/PMHx HLD, anxiety, questionable TIA, Bell's palsy, chronic pain, DJD awaiting elective C-spine fusion surgery, s/p total knee replacement. Pt has had palpitations and SOB for at least 2 years. She began to notice a worsening of her baseline SOB two weeks before admission. Presented w/abnormal EKG, SOB and Palpitations to the ED. She has palpitations every morning which is relieved with Xanax, and has shortness of breath while at rest and with exertion. She also has a pain in her jaw and arms when having the palpitation. Admitted for R/O ACS given her history of smoking and strong FH of early CAD. Negative troponins x3, D-Dimer and serum BNP, mild CAD on cardiac catheterization. Patient has remained hemodynamically stable and tolerating PO intake, ready to be discharged home. 61yo F w/PMHx HLD, anxiety, questionable TIA, Bell's palsy, chronic pain, DJD awaiting elective C-spine fusion surgery, s/p total knee replacement. Pt has had palpitations and SOB for at least 2 years. She began to notice a worsening of her baseline SOB two weeks before admission. Presented w/abnormal EKG, SOB and Palpitations to the ED. She has palpitations every morning which is relieved with Xanax, and has shortness of breath while at rest and with exertion. She also has a pain in her jaw and arms when having the palpitation. Admitted for R/O ACS given her history of smoking and strong FH of early CAD. Negative troponins x3, D-Dimer and serum BNP, mild CAD on cardiac catheterization. Patient has remained hemodynamically stable and tolerating PO intake, ready to be discharged home.  Length of discharge >45 min 63yo F w/PMHx HLD, anxiety, questionable TIA, Bell's palsy, chronic pain, DJD awaiting elective C-spine fusion surgery, s/p total knee replacement. Pt has had palpitations and SOB for at least 2 years. She began to notice a worsening of her baseline SOB two weeks before admission. Presented w/abnormal EKG, SOB and Palpitations to the ED. She has palpitations every morning which is relieved with Xanax, and has shortness of breath while at rest and with exertion. She also has a pain in her jaw and arms when having the palpitation. Admitted for R/O ACS given her history of smoking and strong FH of early CAD. Negative troponins x3, D-Dimer and serum BNP, mild CAD on cardiac catheterization. Patient has remained hemodynamically stable and tolerating PO intake, ready to be discharged home.    Of note, given hx of smoking, believe SOB may be related to underlying COPD. Referral given for pulmonology follow up as an outpatient for PFTs.     Length of discharge >45 min

## 2017-11-17 NOTE — PROGRESS NOTE ADULT - ASSESSMENT
61yo F w/PMHx HLD, Anxiety, questionable TIA, Bell's palsy, chronic pain, DJD awaiting elective C-spine fusion surgery, s/p TKR.  Presents w/abnormal EKG, SOB and Palpitations. Pt has had palpitations and SOB for at least 2 years. Two weeks ago she began to notice a worsening of her baseline SOB. She has palpitations every morning which is relieved with Xanax, and has shortness of breath while at rest and with exertion. She also has a pain in her jaw and arms when having the palpitation. Admitted for R/O ACS.
Atypical chest     Plan   1. bedrest for 1    hours  2. continue aspirin , Statin,    3. follow up with medicine for atypical CP  4. follow up with cardiology 2 weeks post procedure   5. post produral care and instructions reviewed with the patient as well as questions answered.    6. plan for discharge home by medicine .

## 2017-11-17 NOTE — DISCHARGE NOTE ADULT - PLAN OF CARE
smoking cessation, optimize cardiac health Restricted use with no heavy lifting of affected arm for 48 hours.  No submerging the arm in water for 48 hours.  You may start showering today.  Call your doctor for any bleeding, swelling, loss of sensation in the hand or fingers, or fingers turning blue.  If heavy bleeding or large lumps form, hold pressure at the spot and come to the Emergency Cindy [facility] Restricted use with no heavy lifting of affected arm for 48 hours.  No submerging the arm in water for 48 hours.  You may start showering today.  Call your doctor for any bleeding, swelling, loss of sensation in the hand or fingers, or fingers turning blue.  If heavy bleeding or large lumps form, hold pressure at the spot and come to the Emergency Room.  Follow up with cardiology as an outpatient and also with pulmonology to rule out a lung origin of your shortness of breath given your history of smoking.  Continue taking cholesterol medication. Continue taking home medications Oxycodone and Ibuprofen. Continue taking home medications Oxycodone and Ibuprofen. Follow up with your appointments for the upcoming procedure. Continue taking your medications: Seroquel and Xanax and follow up with your outpatient provider. Found to have Nonobstructive Coronary Artery Disease. smoking cessation, optimize cardiac health resolution Believe related to underlying pulmonary disease. Please establish care with a Pulmonologist for examination.

## 2017-11-17 NOTE — DISCHARGE NOTE ADULT - CARE PROVIDER_API CALL
Jose Sanders ([facility]), Cardiology; Cardiovascular Disease; Interventional Cardiology  [address] [id number]  Phone: [phone]  Fax: [phone] Jose Sanders ([facility]), Cardiology; Cardiovascular Disease; Interventional Cardiology  [address] [facility] [address]  [place], [place] [id number]  Phone: [phone]  Fax: [phone]    Lanre Ludwig (), Critical Care Medicine; Internal Medicine; Pulmonary Disease  [address] [facility] [address]  [place] [address]  Phone: [phone]  Fax: [phone]

## 2017-11-17 NOTE — DISCHARGE NOTE ADULT - PATIENT PORTAL LINK FT
“You can access the Follow[facility] Patient Portal, offered by [facility], by registering with the following website: [email or web address]”

## 2017-12-28 ENCOUNTER — APPOINTMENT (OUTPATIENT)
Dept: CARDIOLOGY | Facility: CLINIC | Age: 62
End: 2017-12-28

## 2018-02-12 ENCOUNTER — APPOINTMENT (OUTPATIENT)
Dept: DERMATOLOGY | Facility: CLINIC | Age: 63
End: 2018-02-12
Payer: MEDICARE

## 2018-02-12 PROCEDURE — 99213 OFFICE O/P EST LOW 20 MIN: CPT

## 2018-04-22 ENCOUNTER — MEDICATION RENEWAL (OUTPATIENT)
Age: 63
End: 2018-04-22

## 2018-04-23 ENCOUNTER — CHART COPY (OUTPATIENT)
Age: 63
End: 2018-04-23

## 2018-05-30 ENCOUNTER — APPOINTMENT (OUTPATIENT)
Dept: CARDIOLOGY | Facility: CLINIC | Age: 63
End: 2018-05-30

## 2018-09-24 ENCOUNTER — TRANSCRIPTION ENCOUNTER (OUTPATIENT)
Age: 63
End: 2018-09-24

## 2018-09-25 ENCOUNTER — OUTPATIENT (OUTPATIENT)
Dept: OUTPATIENT SERVICES | Facility: HOSPITAL | Age: 63
LOS: 1 days | End: 2018-09-25
Payer: COMMERCIAL

## 2018-09-25 DIAGNOSIS — Z98.89 OTHER SPECIFIED POSTPROCEDURAL STATES: Chronic | ICD-10-CM

## 2018-09-25 DIAGNOSIS — M47.816 SPONDYLOSIS WITHOUT MYELOPATHY OR RADICULOPATHY, LUMBAR REGION: ICD-10-CM

## 2018-09-25 DIAGNOSIS — L05.91 PILONIDAL CYST WITHOUT ABSCESS: Chronic | ICD-10-CM

## 2018-09-25 DIAGNOSIS — Z96.659 PRESENCE OF UNSPECIFIED ARTIFICIAL KNEE JOINT: Chronic | ICD-10-CM

## 2018-09-25 PROCEDURE — 76000 FLUOROSCOPY <1 HR PHYS/QHP: CPT

## 2018-09-25 PROCEDURE — 64495 INJ PARAVERT F JNT L/S 3 LEV: CPT | Mod: 50

## 2018-09-25 PROCEDURE — 64493 INJ PARAVERT F JNT L/S 1 LEV: CPT | Mod: 50

## 2018-09-25 PROCEDURE — 64494 INJ PARAVERT F JNT L/S 2 LEV: CPT | Mod: 50

## 2018-11-16 ENCOUNTER — APPOINTMENT (OUTPATIENT)
Dept: ORTHOPEDIC SURGERY | Facility: CLINIC | Age: 63
End: 2018-11-16
Payer: MEDICARE

## 2018-11-16 VITALS
HEART RATE: 76 BPM | HEIGHT: 64 IN | DIASTOLIC BLOOD PRESSURE: 87 MMHG | BODY MASS INDEX: 24.24 KG/M2 | SYSTOLIC BLOOD PRESSURE: 125 MMHG | WEIGHT: 142 LBS

## 2018-11-16 PROCEDURE — 20610 DRAIN/INJ JOINT/BURSA W/O US: CPT | Mod: LT

## 2018-11-16 PROCEDURE — 73562 X-RAY EXAM OF KNEE 3: CPT | Mod: LT

## 2018-11-16 PROCEDURE — 99214 OFFICE O/P EST MOD 30 MIN: CPT | Mod: 25

## 2018-11-16 RX ORDER — PREGABALIN 300 MG/1
CAPSULE ORAL
Refills: 0 | Status: ACTIVE | COMMUNITY

## 2018-12-05 ENCOUNTER — APPOINTMENT (OUTPATIENT)
Dept: PULMONOLOGY | Facility: CLINIC | Age: 63
End: 2018-12-05
Payer: MEDICARE

## 2018-12-05 VITALS
DIASTOLIC BLOOD PRESSURE: 62 MMHG | WEIGHT: 140 LBS | OXYGEN SATURATION: 95 % | HEIGHT: 63 IN | BODY MASS INDEX: 24.8 KG/M2 | HEART RATE: 83 BPM | SYSTOLIC BLOOD PRESSURE: 112 MMHG

## 2018-12-05 DIAGNOSIS — J44.9 CHRONIC OBSTRUCTIVE PULMONARY DISEASE, UNSPECIFIED: ICD-10-CM

## 2018-12-05 DIAGNOSIS — Z86.73 PERSONAL HISTORY OF TRANSIENT ISCHEMIC ATTACK (TIA), AND CEREBRAL INFARCTION W/OUT RESIDUAL DEFICITS: ICD-10-CM

## 2018-12-05 DIAGNOSIS — Z86.39 PERSONAL HISTORY OF OTHER ENDOCRINE, NUTRITIONAL AND METABOLIC DISEASE: ICD-10-CM

## 2018-12-05 DIAGNOSIS — F17.200 NICOTINE DEPENDENCE, UNSPECIFIED, UNCOMPLICATED: ICD-10-CM

## 2018-12-05 DIAGNOSIS — R06.09 OTHER FORMS OF DYSPNEA: ICD-10-CM

## 2018-12-05 DIAGNOSIS — Z86.59 PERSONAL HISTORY OF OTHER MENTAL AND BEHAVIORAL DISORDERS: ICD-10-CM

## 2018-12-05 DIAGNOSIS — R05 COUGH: ICD-10-CM

## 2018-12-05 PROCEDURE — 94010 BREATHING CAPACITY TEST: CPT

## 2018-12-05 PROCEDURE — 99204 OFFICE O/P NEW MOD 45 MIN: CPT | Mod: 25

## 2018-12-05 RX ORDER — FLUTICASONE PROPIONATE 50 UG/1
50 SPRAY, METERED NASAL
Qty: 16 | Refills: 0 | Status: DISCONTINUED | COMMUNITY
Start: 2017-04-27 | End: 2018-12-05

## 2018-12-05 RX ORDER — OXYCODONE HYDROCHLORIDE AND ACETAMINOPHEN 10; 325 MG/1; MG/1
TABLET ORAL
Refills: 0 | Status: DISCONTINUED | COMMUNITY
End: 2018-12-05

## 2018-12-05 RX ORDER — LEVOFLOXACIN 500 MG/1
500 TABLET, FILM COATED ORAL
Qty: 10 | Refills: 0 | Status: DISCONTINUED | COMMUNITY
Start: 2017-05-25 | End: 2018-12-05

## 2018-12-05 NOTE — PROGRESS NOTE ADULT - PROBLEM SELECTOR PLAN 7
Progress Notes by Matt Miguel RCS at 04/18/18 01:49 PM     Author:  Matt Miguel RCS Service:  (none) Author Type:  Ordering User     Filed:  04/18/18 01:52 PM Encounter Date:  4/18/2018 Status:  Signed     :  Matt Miguel RCS (Ordering User)            Echocardiogram results forwarded to reading physician.[OC1.1T] DC[OC1.1M]      Revision History        User Key Date/Time User Provider Type Action    > OC1.1 04/18/18 01:52 PM Matt Miguel RCS Ordering User Sign    M - Manual, T - Template             Serum glucose 156  HbA1c AM

## 2018-12-13 NOTE — ED ADULT NURSE NOTE - CADM POA CENTRAL LINE
Low Back Pain H & P    Chief Complaint: Patient presents with:  Low Back Pain: Patient last seen on 8/14/18. Patient had right hip replacement surgery and feels her right hip is doing very well. She completed rehab for right hip.  Patient states she still h glasses       Past Surgical History   Past Surgical History:   Procedure Laterality Date   • CATARACT EXTRACTION W/  INTRAOCULAR LENS IMPLANT Right 9/12/11    JOEY   • CHOLECYSTECTOMY     • HIP TOTAL REPLACEMENT Right 9/17/2018    Performed by Kika Ibanez Occupational Exposure: Not Asked        Hobby Hazards: Not Asked        Sleep Concern: Not Asked        Stress Concern: Not Asked        Weight Concern: Not Asked        Special Diet: Not Asked        Back Care: Not Asked        Exercise: No        Bi pedis pulse-LEFT 2+   Tibialis posterior pulse-RIGHT 2+   Tibialis posterior pulse-LEFT 2+     Neurological Lower Extremity:    Light Touch Sensation: Intact in bilateral Lower Extremities   LE Muscle Strength:  All LE strength measurements 5/5 except:  Ham No

## 2018-12-31 ENCOUNTER — OTHER (OUTPATIENT)
Age: 63
End: 2018-12-31

## 2019-01-28 ENCOUNTER — APPOINTMENT (OUTPATIENT)
Dept: ORTHOPEDIC SURGERY | Facility: CLINIC | Age: 64
End: 2019-01-28
Payer: MEDICARE

## 2019-01-28 PROCEDURE — 72170 X-RAY EXAM OF PELVIS: CPT

## 2019-01-28 PROCEDURE — 20610 DRAIN/INJ JOINT/BURSA W/O US: CPT | Mod: LT

## 2019-01-28 NOTE — ADDENDUM
[FreeTextEntry1] : I, Remigio Conde, acted solely as a scribe for Dr. German Guerrero on this date 01/28/2019 .

## 2019-01-28 NOTE — HISTORY OF PRESENT ILLNESS
[Worsening] : worsening [de-identified] : 63 year old female presents with left knee pain, which started on 10/19/18. The patient is a smoker and reduced her smoking to less than one pack a day. The pain is in the medial aspect of the knee shooting up the thigh sometimes into the groin. She notes left knee pain as well. She describes the pain as a sharp, throbbing pain. She notes walking aggravates the pain. Nothing in particular relieves the pain. She is now also complaining of difficulty standing for long periods of time, and some left lateral hip pain as well that she attributes to compensating. She has difficulty performing ADL's. Cortisone injection did not help her pain. \par The patient had 6 operations on her right knee from [id number]. She also had 4 fusions in her neck done on 12/1/18.

## 2019-01-28 NOTE — PHYSICAL EXAM
[Normal] : Gait: normal [LE] : Sensory: Intact in bilateral lower extremities [ALL] : dorsalis pedis, posterior tibial, femoral, popliteal, and radial 2+ and symmetric bilaterally [Poor Appearance] : well-appearing [Acute Distress] : not in acute distress [Obese] : not obese [de-identified] : GENERAL APPEARANCE: Well nourished and hydrated, pleasant, alert, and oriented x 3. Appears their stated age. \par HEENT: Normocephalic, extraocular eye motion intact. Nasal septum midline. Oral cavity clear. External auditory canal clear. \par RESPIRATORY: Breath sounds clear and audible in all lobes. No wheezing, No accessory muscle use.\par CARDIOVASCULAR: No apparent abnormalities. No lower leg edema. No varicosities. Pedal pulses are palpable.\par NEUROLOGIC: Sensation is normal, no muscle weakness in the upper or lower extremities.\par DERMATOLOGIC: No apparent skin lesions, moist, warm, no rash.\par SPINE: Cervical spine appears normal and moves freely; thoracic spine appears normal and moves freely; lumbosacral spine appears normal and moves freely, normal, nontender.\par MUSCULOSKELETAL: Hands, wrists, and elbows are normal and move freely, shoulders are normal and move freely.  [de-identified] : Left knee examination demonstrates medial joint line tenderness to palpation, range of motion preserved 0-120, no effusion, no instability.  [de-identified] : MRI L knee performed on 1/7/19 at [facility] reveals tricompartmental arthrosis most pronounced at the patellofemoral compartment, fraying of the free edge of the posterior horn lateral meniscus, large popliteal cyst. \par \par 1V xray of the pelvis done in office today and reviewed by Dr. German Guerrero demonstrates well-preserved joint without findings or any evidence of fracture, dislocation, or any other acute orthopedic pathology. There is no radiographic features of severe OA present.

## 2019-01-28 NOTE — DISCUSSION/SUMMARY
[de-identified] : 63 year old female presents with left knee moderate tricompartmental knee osteoarthritis. We discussed the nature of the condition and the treatment options. She elected to receive Gel-One injection today, which she tolerated well. It seems premature to consider TKA at this point. I encouraged her to nonunion cervical spine first. She will FU here prn. \par \par A knee replacement means resurfacing of all 3 surfaces of the patella, the femur, and tibia with metal and plastic parts. The prosthetic parts are usually cemented into position and well outpatient range of motion from full extension to about 120° of flexion. The postoperative motion, however, is determined by multiple factors, the most important of which is preoperative motion. In general, the better motion preoperatively, the better the motion postoperatively. The operation, pending medical clearance, gently requires hospitalization of 3-4 days for one knee, 5-7 days for bilateral knee replacements. In general, we prefer to perform the procedure under spinal anesthesia with femoral nerve block and occasional single shot sciatic nerve block. We may ask a patient to give 2 units of blood for bilateral total knee arthroplasties, for one knee we institute a normogram which may include administration of preoperative Procrit. The operative procedure takes probably 1-2 hours. The operation requires a straight incision anywhere from 5-7 inches down from the knee. Postoperatively, the patient is positioned in a continuous passive motion machine within the first 24 hours and is walking the day after surgery. The first couple days are very painful and the pain medication will alleviate, but not eliminate the pain. The patient must really push hard to get range of motion. Our goal for having a person go home as that the range of motion is approximately 0-90° of flexion and that they can walk with a walker or cane. A walking aid is to be dispensed once the patient is secure enough. In general there, there is no cast or brace required with routine knee replacement. In the long term, we do not encourage our patients to run for the sake of running, although pending their preoperative status, we often allow patient to play doubles tennis or comparative activities. We also allowed them to do gentle intermediate downhill skiing if they are truly an expert skier. Biking is encouraged as well swimming. The followup periods are usually 3 weeks, 6 weeks, 3 months, and yearly intervals. Potential complications with total knee replacement included anesthetic complications and death, infection around 1%, nerve damage, by which means peroneal nerve palsy, footdrop or flapping foot with ambulation. This is particularly more apt to occur in the patient with a valgus or knock-knee deformity. The incidence can be quite high in this particular patient population. There will be areas of skin numbness, but this is not an untoward effect nor do we consider it a complication. Other potential complications include dislocation of the patella component, usually less than 2%; loosening of the tibial or femoral component is much more infrequent. Most often this occurs with infection or long-term use. Patient of extreme risk including markedly overweight patient's may be more prone to prosthetic wear. Major blood vessel damage is also extremely rare. Directly because of the anatomic proximity of the popliteal artery this could be lacerated with subsequent repair required. Be it unlikely, disruption of the popliteal artery could theoretically result in amputation. Similarly, infection could theoretically result in amputation if one were to grow out of an organism that cannot be controlled with antibiotics. General medical complications include phlebitis, for which we would prophylactically anticoagulate patients, but could still occur, and fatal pulmonary embolus which has been reported. Cardiovascular problems, such as heart attack or ischemia are always a concern with such hemodynamic changes in the blood vascular system. Other General complications are rare, but anything medicine could theoretically happen. I think the patient understands the risk benefit ratio of total knee replacement and will think about whether there like to pursue with an operation or nonoperative treatment program.

## 2019-01-28 NOTE — PROCEDURE
[de-identified] : I injected the patients left knee today with Gel-One Lot: [id number], Expiration: 6/18/2019\par \par Knee injection visco-supplementation: I discussed at length with the patient the planned steroid and lidocaine injection for primary osteoarthritis. The risks, benefits, convalescence and alternatives were reviewed and pt consented for injection. The possible side effects discussed included but were not limited to: pain, swelling, heat and redness. There symptoms are generally mild but if they are extensive then contact the office. Giving pain relievers by mouth such as NSAIDs or Tylenol can generally treat the reactions to injection. Rare cases of infection have been noted. Rash, hives and itching may occur post injection. If you have muscle pain or cramps, flushing and or swelling of the face, rapid heart beat, nausea, dizziness, fever, chills, headache, difficulty breathing, swelling in the arms or legs, or have a prickly feeling of your skin, contact a health care provider immediately. Following this discussion, the knee was prepped with alcohol and under sterile condition the injection was performed through a superolateral injection site with a 20 gauge needle. The needle was introduced into the joint, aspiration was performed to ensure intra-articular placement and the medication was injected. Upon withdrawal of the needle the site was cleaned with alcohol and a band aid applied. The patient tolerated the injection well and there were no adverse effects. Post injection instructions included no strenuous activity for 24 hours, cryotherapy and if there are any adverse effects to contact the office.

## 2019-02-15 ENCOUNTER — APPOINTMENT (OUTPATIENT)
Dept: ORTHOPEDIC SURGERY | Facility: CLINIC | Age: 64
End: 2019-02-15
Payer: MEDICARE

## 2019-02-15 VITALS
HEIGHT: 63 IN | WEIGHT: 140 LBS | HEART RATE: 81 BPM | SYSTOLIC BLOOD PRESSURE: 142 MMHG | BODY MASS INDEX: 24.8 KG/M2 | DIASTOLIC BLOOD PRESSURE: 82 MMHG

## 2019-02-15 PROCEDURE — 99213 OFFICE O/P EST LOW 20 MIN: CPT

## 2019-02-15 NOTE — PHYSICAL EXAM
[de-identified] : General: well-appearing, not in acute distress and not obese. \par GENERAL APPEARANCE: Well nourished and hydrated, pleasant, alert, and oriented x 3. Appears their stated age. \par HEENT: Normocephalic, extraocular eye motion intact. Nasal septum midline. Oral cavity clear. External auditory canal clear. \par CARDIOVASCULAR: No apparent abnormalities. No lower leg edema. No varicosities. Pedal pulses are palpable.\par NEUROLOGIC: Sensation is normal, no muscle weakness in the upper or lower extremities.\par DERMATOLOGIC: No apparent skin lesions, moist, warm, no rash.\par \par MUSCULOSKELETAL: Hands, wrists, and elbows are normal and move freely, shoulders are normal and move freely. \par Examination of the knee shows a healed incision she has a moderate knee effusion range of motion is good full extension present flexion to 140° no instability noted, She does not have any knee instability. \par Motor: 5/5 motor strength in bilateral upper & lower extremities. Sensory: Intact in bilateral upper & lower extremities. DTRs: Biceps, brachioradialis, triceps, patellar, ankle and plantar 2+ and symmetric bilaterally. Pulses: dorsalis pedis, posterior tibial, femoral, popliteal, and radial 2+ and symmetric bilaterally. \par  [de-identified] : I obtained 3 view left knee xray shows mild tricompartmental degenerative joint disease. No fracture. \par

## 2019-02-15 NOTE — HISTORY OF PRESENT ILLNESS
[Pain Location] : pain [Worsening] : worsening [___ wks] : [unfilled] week(s) ago [6] : an average pain level of 6/10 [5] : a minimum pain level of 5/10 [8] : a maximum pain level of 8/10 [Standing] : standing [Daily] : ~He/She~ states the symptoms seem to be occuring daily [Walking] : worsened by walking [de-identified] : 64 year old female presents with left knee pain, which started on 10/19/18. pt states she fell going up the curb about 2 weeks ago. she c/p left knee buckling. \par \par The patient is a smoker and reduced her smoking to 5 cigarettes per day. The pain is in the medial and posterior aspect of the knee.  She describes the pain as a sharp, throbbing pain. She notes walking aggravates the pain. Nothing in particular relieves the pain.  She is now also complaining of difficulty standing for long periods of time,  w She has difficulty performing ADL's. Cortisone injection and H.A injection did not help her pain. taking tylenol and NSAIDs do not help. she states " I know percocet 10mg is only thing that helped me" pt request a prescription for  percocet 10/325mg 2 tabs per day. \par \par \par The patient had 6 operations on her right knee from 1190-6174. She also had 4 fusions in her neck done on 12/1/18. \par \par Pt is under pain management receiving lyrica for neck pain.\par Pt states the pain management would not give and a list of physician who might give percecet was given to her.

## 2019-02-15 NOTE — DISCUSSION/SUMMARY
[de-identified] : Patient presents back with left knee pain. pt's xray is reassuring. she has mild O.A and I ordered MRI to r/o internal rearrangement. \par  she ahs functionally successful outcome of a right knee replacement with some persistent pain. \par I do feel she has high level of pain than her diagnostic studies for Physcial examination. \par Pt deferred all treatment including knee injection, PT or non narcotic pain medication. \par I had LONG DISCUSSION REGARDING USE OF NARCOTIC PAIN MEDICATION.  She has established a relationship with a pain doctor. We cannot provide any pain medication for the patient. I strongly encouraged to quit smoking and address her cervicalgia and cervical spondylosis. \par \par

## 2019-08-08 ENCOUNTER — RESULT REVIEW (OUTPATIENT)
Age: 64
End: 2019-08-08

## 2019-08-09 ENCOUNTER — APPOINTMENT (OUTPATIENT)
Dept: DERMATOLOGY | Facility: CLINIC | Age: 64
End: 2019-08-09
Payer: MEDICARE

## 2019-08-09 PROCEDURE — 17003 DESTRUCT PREMALG LES 2-14: CPT

## 2019-08-09 PROCEDURE — 17000 DESTRUCT PREMALG LESION: CPT | Mod: 59

## 2019-08-09 PROCEDURE — 11102 TANGNTL BX SKIN SINGLE LES: CPT | Mod: 59

## 2019-08-09 PROCEDURE — 99214 OFFICE O/P EST MOD 30 MIN: CPT | Mod: 25

## 2019-08-09 PROCEDURE — 17281 DSTR MAL LS F/E/E/N/L/M .6-1: CPT

## 2019-09-04 ENCOUNTER — APPOINTMENT (OUTPATIENT)
Dept: ORTHOPEDIC SURGERY | Facility: CLINIC | Age: 64
End: 2019-09-04
Payer: MEDICARE

## 2019-09-04 VITALS — SYSTOLIC BLOOD PRESSURE: 121 MMHG | DIASTOLIC BLOOD PRESSURE: 87 MMHG | HEART RATE: 79 BPM

## 2019-09-04 VITALS
BODY MASS INDEX: 24.8 KG/M2 | HEART RATE: 147 BPM | HEIGHT: 63 IN | DIASTOLIC BLOOD PRESSURE: 42 MMHG | WEIGHT: 140 LBS | SYSTOLIC BLOOD PRESSURE: 77 MMHG

## 2019-09-04 PROCEDURE — 99214 OFFICE O/P EST MOD 30 MIN: CPT

## 2019-09-04 NOTE — ADDENDUM
[FreeTextEntry1] : I, Andrew Diamante, acted solely as a scribe for Dr. German Guerrero on this date 09/04/2019 .\par All medical record entries made by the Scribe were at my, Dr. German Guerrero, direction and personally dictated by me on 09/04/2019 . I have reviewed the chart and agree that the record accurately reflects my personal performance of the history, physical exam, assessment and plan. I have also personally directed, reviewed, and agreed with the chart.\par

## 2019-09-04 NOTE — HISTORY OF PRESENT ILLNESS
[Pain Location] : pain [Worsening] : worsening [8] : a current pain level of 8/10 [Walking] : worsened by walking [de-identified] : 64 year old female presents with left knee pain, which started on 10/19/18. pt states she had 8/10 pain, \par pt  went for second opinion, had MRI showed lateral meniscus tear and underwent left knee scope- in late march or so. \par she reports no improvement of pain after sx.\par \par The patient is a smoker and reduced her smoking to 5 cigarettes per day. The pain is in the medial and posterior aspect of the knee.  She describes the pain as a sharp, throbbing pain. She notes walking aggravates the pain. Nothing in particular relieves the pain.  She is now also complaining of difficulty standing for long periods of time,  w She has difficulty performing ADL's. Cortisone injection and H.A injection did not help much. taking tylenol and NSAIDs (ibuprofen 800mg) do not help. \par \par The patient had 6 operations on her right knee from 4346-8867. She also had 4 fusions in her neck done on 12/1/18. \par \par Pt is under pain management receiving lyrica for neck pain but now she is off lyrica for extreme fatigue.\par Pt states the pain management would not give any more percecet.

## 2019-09-04 NOTE — PHYSICAL EXAM
[LE] : Sensory: Intact in bilateral lower extremities [ALL] : dorsalis pedis, posterior tibial, femoral, popliteal, and radial 2+ and symmetric bilaterally [Antalgic] : not antalgic [de-identified] : General: well-appearing, not in acute distress and not obese. \par GENERAL APPEARANCE: Well nourished and hydrated, pleasant, alert, and oriented x 3. Appears their stated age. \par HEENT: Normocephalic, extraocular eye motion intact. Nasal septum midline. Oral cavity clear. External auditory canal clear. \par CARDIOVASCULAR: No apparent abnormalities. No lower leg edema. No varicosities. Pedal pulses are palpable.\par NEUROLOGIC: Sensation is normal, no muscle weakness in the upper or lower extremities.\par DERMATOLOGIC: No apparent skin lesions, moist, warm, no rash.\par \par MUSCULOSKELETAL: Hands, wrists, and elbows are normal and move freely, shoulders are normal and move freely. \par Examination of the right knee shows a healed incision she has a moderate knee effusion range of motion is good full extension present flexion to 140° no instability noted, She does not have any knee instability. \par \par left knee examination shows healed the scope side slight varus alignment no joint effusion full range of motion without significant crepitus\par \par Motor: 5/5 motor strength in bilateral upper & lower extremities. Sensory: Intact in bilateral upper & lower extremities. DTRs: Biceps, brachioradialis, triceps, patellar, ankle and plantar 2+ and symmetric bilaterally. Pulses: dorsalis pedis, posterior tibial, femoral, popliteal, and radial 2+ and symmetric bilaterally. \par  [de-identified] : No new X-rays obtained.

## 2019-09-04 NOTE — DISCUSSION/SUMMARY
[de-identified] : 63 year old female presents with left knee moderate tricompartmental knee osteoarthritis. We discussed the nature of the condition and the treatment options.  It seems premature to consider TKA at this point. I ordered an MRI for the patient's left knee today. \par \par A knee replacement means resurfacing of all 3 surfaces of the patella, the femur, and tibia with metal and plastic parts. The prosthetic parts are usually cemented into position and well outpatient range of motion from full extension to about 120° of flexion. The postoperative motion, however, is determined by multiple factors, the most important of which is preoperative motion. In general, the better motion preoperatively, the better the motion postoperatively. The operation, pending medical clearance, gently requires hospitalization of 3-4 days for one knee, 5-7 days for bilateral knee replacements. In general, we prefer to perform the procedure under spinal anesthesia with femoral nerve block and occasional single shot sciatic nerve block. We may ask a patient to give 2 units of blood for bilateral total knee arthroplasties, for one knee we institute a normogram which may include administration of preoperative Procrit. The operative procedure takes probably 1-2 hours. The operation requires a straight incision anywhere from 5-7 inches down from the knee. Postoperatively, the patient is positioned in a continuous passive motion machine within the first 24 hours and is walking the day after surgery. The first couple days are very painful and the pain medication will alleviate, but not eliminate the pain. The patient must really push hard to get range of motion. Our goal for having a person go home as that the range of motion is approximately 0-90° of flexion and that they can walk with a walker or cane. A walking aid is to be dispensed once the patient is secure enough. In general there, there is no cast or brace required with routine knee replacement. In the long term, we do not encourage our patients to run for the sake of running, although pending their preoperative status, we often allow patient to play doubles tennis or comparative activities. We also allowed them to do gentle intermediate downhill skiing if they are truly an expert skier. Biking is encouraged as well swimming. The followup periods are usually 3 weeks, 6 weeks, 3 months, and yearly intervals. Potential complications with total knee replacement included anesthetic complications and death, infection around 1%, nerve damage, by which means peroneal nerve palsy, footdrop or flapping foot with ambulation. This is particularly more apt to occur in the patient with a valgus or knock-knee deformity. The incidence can be quite high in this particular patient population. There will be areas of skin numbness, but this is not an untoward effect nor do we consider it a complication. Other potential complications include dislocation of the patella component, usually less than 2%; loosening of the tibial or femoral component is much more infrequent. Most often this occurs with infection or long-term use. Patient of extreme risk including markedly overweight patient's may be more prone to prosthetic wear. Major blood vessel damage is also extremely rare. Directly because of the anatomic proximity of the popliteal artery this could be lacerated with subsequent repair required. Be it unlikely, disruption of the popliteal artery could theoretically result in amputation. Similarly, infection could theoretically result in amputation if one were to grow out of an organism that cannot be controlled with antibiotics. General medical complications include phlebitis, for which we would prophylactically anticoagulate patients, but could still occur, and fatal pulmonary embolus which has been reported. Cardiovascular problems, such as heart attack or ischemia are always a concern with such hemodynamic changes in the blood vascular system. Other General complications are rare, but anything medicine could theoretically happen. I think the patient understands the risk benefit ratio of total knee replacement and will think about whether there like to pursue with an operation or nonoperative treatment program.

## 2019-12-23 ENCOUNTER — APPOINTMENT (OUTPATIENT)
Dept: ORTHOPEDIC SURGERY | Facility: CLINIC | Age: 64
End: 2019-12-23

## 2020-03-18 NOTE — H&P ADULT - PROBLEM SELECTOR PROBLEM 6
[facility] Hospital Medicine  Progress Note    Patient Name: Juice Sutherland  MRN: [id number]  Patient Class: IP- Inpatient   Admission Date: 3/16/2020  Length of Stay: 2 days  Attending Physician: Philip Castorena MD  Primary Care Provider: Starla Parada MD        Subjective:     Principal Problem:Acute hypoxemic respiratory failure        HPI:    Juice Sutherland is a 60 y.o. male that (in part)  has a past medical history of Diabetes mellitus, Hyperlipemia, and Hypertension.  has a past surgical history that includes Fracture surgery. Presents to [facility] Emergency Department shortness of breath associated with fever, chills, and cough.  Began 8 days ago with progressive worsening.  Was initially evaluated in the emergency department, tested negative for the flu, but was not hypoxemic therefore discharged to home.  Re-presented night with similar worsening symptoms.  Was not hypoxemic at rest, but oxygen levels desaturated to 87% with ambulation.  Denies recent travel, sick contacts, cruise trips, or use of [employer]/lift services.      In the emergency department routine laboratory studies, influenza testing, cardiac enzymes, and chest x-ray was obtained.  Chest x-ray concerning for bilateral diffuse nonspecific interstitial coarsening suggested of pulmonary edema versus infectious or inflammatory interstitial pneumonia.  No focal consolidations were identified.  Findings were suggestive of COVID-19 given the negative influenza testing, fever, transaminitis, low normal procalcitonin, and markedly elevated CRP levels.    Hospital medicine has been asked to admit to inpatient for further evaluation and treatment for acute hypoxemic respiratory failure and suspected COVID-19.     Overview/Hospital Course:  59 y/o male presents with shortness of breath, fever and chills.  Febrile and hypoxic on presentation.  Concern about possible COVID 19 infection.  Empirically started on  Rocephin and Zithromax.  Influenza negative.  Supportive care and oxygen.    Interval History: Not feeling well.  Poor appetite.    Review of Systems   HENT: Negative for ear discharge and ear pain.    Eyes: Negative for discharge and itching.   Endocrine: Negative for cold intolerance and heat intolerance.   Neurological: Negative for seizures and syncope.     Objective:     Vital Signs (Most Recent):  Temp: 98.3 °F (36.8 °C) (03/18/20 1204)  Pulse: 75 (03/18/20 1204)  Resp: 16 (03/18/20 1204)  BP: 117/66 (03/18/20 1204)  SpO2: 96 % (03/18/20 1204) Vital Signs (24h Range):  Temp:  [98.3 °F (36.8 °C)-100.8 °F (38.2 °C)] 98.3 °F (36.8 °C)  Pulse:  [69-76] 75  Resp:  [16-18] 16  SpO2:  [89 %-96 %] 96 %  BP: (101-118)/(59-66) 117/66     Weight: 72.9 kg (160 lb 11.5 oz)  Body mass index is 26.74 kg/m².    Intake/Output Summary (Last 24 hours) at 3/18/2020 1605  Last data filed at 3/18/2020 0830  Gross per 24 hour   Intake 320 ml   Output --   Net 320 ml      Physical Exam   Constitutional: He is oriented to person, place, and time. No distress.   HENT:   Head: Normocephalic and atraumatic.   Eyes: Conjunctivae are normal. Right eye exhibits no discharge. Left eye exhibits no discharge.   Neck: Neck supple. No tracheal deviation present.   Cardiovascular: Normal rate and regular rhythm.   Pulmonary/Chest: Effort normal.   Coarse BS bilateral bases   Abdominal: Soft. Bowel sounds are normal.   Musculoskeletal: He exhibits no edema or deformity.   Neurological: He is alert and oriented to person, place, and time.   Skin: Skin is warm and dry. He is not diaphoretic.       Significant Labs:   CBC:   Recent Labs   Lab 03/16/20  1700 03/18/20  0636   WBC 5.94 3.65*   HGB 14.8 13.8*   HCT 46.4 40.8    261     CMP:   Recent Labs   Lab 03/16/20  1818 03/18/20  0636    137   K 3.7 4.0    103   CO2 18* 21*   GLU 98 95   BUN 20 22*   CREATININE 1.1 1.3   CALCIUM 8.5* 8.4*   PROT 7.5 7.5   ALBUMIN 3.4* 3.1*    BILITOT 0.3 0.3   ALKPHOS 147* 158*   AST 68* 109*   * 126*   ANIONGAP 15 13   EGFRNONAA >60 59*       Significant Imaging: I have reviewed all pertinent imaging results/findings within the past 24 hours.      Assessment/Plan:      * Acute hypoxemic respiratory failure  Patient presented with SOB, fever and hypoxia.  Concerning for COVID 19 infection.  Awaiting test results.  Empirically started on Rocephin and Zithromax.  Supportive care and supplemental oxygen.  Titrate down oxygen as possible.  Hemodynamically stable.  Continue current management and hopefully home soon.    Transaminitis  Probably secondary to viral illness.      Hyperlipidemia        Diabetes mellitus, type 2  Well controlled.  Continue diabetic diet and insulin sliding scale.      Essential hypertension  Continue Losartan.      Suspected Covid-19 Virus Infection  As above.        VTE Risk Mitigation (From admission, onward)         Ordered     IP VTE LOW RISK PATIENT  Once      03/16/20 2346     Place ANNA hose  Until discontinued      03/16/20 2346     Place sequential compression device  Until discontinued      03/16/20 2346                      Philip Castorena MD  Department of Hospital Medicine   [facility]     Dehydration

## 2020-04-08 ENCOUNTER — APPOINTMENT (OUTPATIENT)
Dept: ORTHOPEDIC SURGERY | Facility: CLINIC | Age: 65
End: 2020-04-08
Payer: MEDICARE

## 2020-04-08 DIAGNOSIS — Z96.659 PRESENCE OF UNSPECIFIED ARTIFICIAL KNEE JOINT: ICD-10-CM

## 2020-04-08 PROCEDURE — 99442: CPT

## 2020-04-08 NOTE — DISCUSSION/SUMMARY
[de-identified] : Had a long discussion with the patient.  She has progressive pain in her left knee.  She appears to be interested in knee replacement.  In the past I did not feel that she was a candidate yet for surgery.  The patient feels that she needs to come in to be evaluated.  I discussed discouraged and evaluation at this point secondary to the coronavirus pandemic but she feels that her pain is too severe to postpone further evaluation.  She understands that she is putting herself and others at risk of spreading the virus and she is going to come with personal protective equipment and we will try to minimize her intraoffice exposure.\par \par I will evaluate the patient at the next available office visit she will likely need new x-rays of her left knee.\par

## 2020-04-08 NOTE — REVIEW OF SYSTEMS
[Arthralgia] : arthralgia [Joint Pain] : joint pain [Joint Stiffness] : joint stiffness [Joint Swelling] : joint swelling [Headache] : headache [Negative] : Heme/Lymph

## 2020-04-08 NOTE — HISTORY OF PRESENT ILLNESS
[Home] : at home, [unfilled] , at the time of the visit. [Medical Office: ([facility])___] : at ~his/her~ medical office located in [place] [Patient] : the patient [Self] : self [FreeTextEntry4] : Yeon Elebiary. NP [de-identified] : This was a telephonic visit only.  I spoke with the patient for 20 minutes on the phone.\par 65 year old female presents with worsening eft knee pain, which started on 10/19/18. pt states she had 8/10 pain, \par pt went for second opinion, had MRI showed lateral meniscus tear and underwent left knee scope- in late march or so. \par she reports no improvement of pain after sx. she is quite frustrated with her outcome following the arthroscopic surgery.\par He did undergo a right total knee arthroplasty in the past and has had good outcome with that knee.\par \par She remains interested in left total knee arthroplasty but we have discouraged that in the past because she did not have bone-on-bone knee arthritis on x-rays.\par Discussed with her that the risk of residual pain would be quite high.\par Recently she feels that her knee pain is much is worsening substantially she reports swelling and inability to perform activities of daily living.\par \par The patient is a smoker and reduced her smoking to 5 cigarettes per day. The pain is in the medial and posterior aspect of the knee. She describes the pain as a sharp, throbbing pain. She notes walking aggravates the pain. Nothing in particular relieves the pain. She is now also complaining of difficulty standing for long periods of time, w She has difficulty performing ADL's. Cortisone injection and H.A injection did not help much. taking tylenol and NSAIDs (ibuprofen 800mg) do not help. \par \par The patient had 6 operations on her right knee from 6452-8605. She also had 4 fusions in her neck done on 12/1/18. \par \par \par \par  [Worsening] : worsening [___ yrs] : [unfilled] year(s) ago [8] : a current pain level of 8/10 [7] : an average pain level of 7/10 [5] : a minimum pain level of 5/10 [9] : a maximum pain level of 9/10 [Daily] : ~He/She~ states the symptoms seem to be occuring daily [Direct Pressure] : worsened by direct pressure [Sitting] : worsened by sitting [Walking] : worsened by walking [Knee Flexion] : worsened with knee flexion [Knee Extension] : worsened with knee extension [Exercise Regimen] : not relieved by exercise regimen [NSAIDs] : not relieved by nonsteroidal anti-inflammatory drugs [Recumbency] : not relieved by recumbency [Rest] : not relieved with rest

## 2020-06-04 ENCOUNTER — APPOINTMENT (OUTPATIENT)
Dept: ORTHOPEDIC SURGERY | Facility: CLINIC | Age: 65
End: 2020-06-04
Payer: MEDICARE

## 2020-06-04 VITALS
HEIGHT: 62 IN | SYSTOLIC BLOOD PRESSURE: 122 MMHG | DIASTOLIC BLOOD PRESSURE: 81 MMHG | WEIGHT: 150 LBS | BODY MASS INDEX: 27.6 KG/M2 | HEART RATE: 75 BPM

## 2020-06-04 VITALS — TEMPERATURE: 98.1 F

## 2020-06-04 PROCEDURE — 73562 X-RAY EXAM OF KNEE 3: CPT | Mod: LT

## 2020-06-04 PROCEDURE — 99214 OFFICE O/P EST MOD 30 MIN: CPT

## 2020-06-04 NOTE — END OF VISIT
[FreeTextEntry3] : I, Sae Rosas, acted solely as a scribe for Dr. German Guerrero on this date 06/04/2020.

## 2020-06-04 NOTE — DISCUSSION/SUMMARY
[de-identified] : 66 y/o F with chronic persistent knee pain. Pt has previously failed to respond to cortisone injections. She is interested in pursuing hyaluronic acid injection, and she is also interested in left knee replacement. We will set up hyaluronic acid injections in near future.  We discussed the likelihood of persistent pain in her left knee prior to the injections, and if she does not respond to conservative therapy. Pt may also be a candidate for left hip replacement in the future based on imaging results. \par \par A knee replacement means resurfacing of all 3 surfaces of the patella, the femur, and tibia with metal and plastic parts. The prosthetic parts are usually cemented into position and well outpatient range of motion from full extension to about 120° of flexion. The postoperative motion, however, is determined by multiple factors, the most important of which is preoperative motion. In general, the better motion preoperatively, the better the motion postoperatively. The operation, pending medical clearance, gently requires hospitalization of 3-4 days for one knee, 5-7 days for bilateral knee replacements. In general, we prefer to perform the procedure under spinal anesthesia with femoral nerve block and occasional single shot sciatic nerve block. We may ask a patient to give 2 units of blood for bilateral total knee arthroplasties, for one knee we institute a normogram which may include administration of preoperative Procrit. The operative procedure takes probably 1-2 hours. The operation requires a straight incision anywhere from 5-7 inches down from the knee. Postoperatively, the patient is positioned in a continuous passive motion machine within the first 24 hours and is walking the day after surgery. The first couple days are very painful and the pain medication will alleviate, but not eliminate the pain. The patient must really push hard to get range of motion. Our goal for having a person go home as that the range of motion is approximately 0-90° of flexion and that they can walk with a walker or cane. A walking aid is to be dispensed once the patient is secure enough. In general there, there is no cast or brace required with routine knee replacement. In the long term, we do not encourage our patients to run for the sake of running, although pending their preoperative status, we often allow patient to play doubles tennis or comparative activities. We also allowed them to do gentle intermediate downhill skiing if they are truly an expert skier. Biking is encouraged as well swimming. The followup periods are usually 3 weeks, 6 weeks, 3 months, and yearly intervals. Potential complications with total knee replacement included anesthetic complications and death, infection around 1%, nerve damage, by which means peroneal nerve palsy, footdrop or flapping foot with ambulation. This is particularly more apt to occur in the patient with a valgus or knock-knee deformity. The incidence can be quite high in this particular patient population. There will be areas of skin numbness, but this is not an untoward effect nor do we consider it a complication. Other potential complications include dislocation of the patella component, usually less than 2%; loosening of the tibial or femoral component is much more infrequent. Most often this occurs with infection or long-term use. Patient of extreme risk including markedly overweight patient's may be more prone to prosthetic wear. Major blood vessel damage is also extremely rare. Directly because of the anatomic proximity of the popliteal artery this could be lacerated with subsequent repair required. Be it unlikely, disruption of the popliteal artery could theoretically result in amputation. Similarly, infection could theoretically result in amputation if one were to grow out of an organism that cannot be controlled with antibiotics. General medical complications include phlebitis, for which we would prophylactically anticoagulate patients, but could still occur, and fatal pulmonary embolus which has been reported. Cardiovascular problems, such as heart attack or ischemia are always a concern with such hemodynamic changes in the blood vascular system. Other General complications are rare, but anything medicine could theoretically happen. I think the patient understands the risk benefit ratio of total knee replacement and will think about whether there like to pursue with an operation or nonoperative treatment program.

## 2020-06-04 NOTE — PHYSICAL EXAM
[LE] : Sensory: Intact in bilateral lower extremities [ALL] : dorsalis pedis, posterior tibial, femoral, popliteal, and radial 2+ and symmetric bilaterally [Antalgic] : not antalgic [de-identified] : General: well-appearing, not in acute distress and not obese. \par GENERAL APPEARANCE: Well nourished and hydrated, pleasant, alert, and oriented x 3. Appears their stated age. \par HEENT: Normocephalic, extraocular eye motion intact. Nasal septum midline. Oral cavity clear. External auditory canal clear. \par CARDIOVASCULAR: No apparent abnormalities. No lower leg edema. No varicosities. Pedal pulses are palpable.\par NEUROLOGIC: Sensation is normal, no muscle weakness in the upper or lower extremities.\par DERMATOLOGIC: No apparent skin lesions, moist, warm, no rash.\par \par MUSCULOSKELETAL: Hands, wrists, and elbows are normal and move freely, shoulders are normal and move freely. \par Examination of the right knee shows a healed incision she has a moderate knee effusion range of motion is good full extension present flexion to 140° no instability noted, She does not have any knee instability. \par \par left knee examination shows healed the scope side slight varus alignment no joint effusion full range of motion without significant crepitus\par posterior medial palpable baker's cyst 2.5 cm wide and 4cm long,\par \par Motor: 5/5 motor strength in bilateral upper & lower extremities. Sensory: Intact in bilateral upper & lower extremities. DTRs: Biceps, brachioradialis, triceps, patellar, ankle and plantar 2+ and symmetric bilaterally. Pulses: dorsalis pedis, posterior tibial, femoral, popliteal, and radial 2+ and symmetric bilaterally. \par  [de-identified] : Left knee exam shows moderate sized Baker's cyst, normal ROM with no significant knee effusion. Mild antalgic gait [de-identified] : 3 v xray of b/l knee shows s/p right TKR in good positioning. Advanced left knee patellofemoral osteoarthritis

## 2020-06-04 NOTE — HISTORY OF PRESENT ILLNESS
[Pain Location] : pain [Worsening] : worsening [___ yrs] : [unfilled] year(s) ago [8] : a maximum pain level of 8/10 [Bending] : worsened by bending [Sitting] : worsened by sitting [Walking] : worsened by walking [Knee Flexion] : worsened with knee flexion [Knee Extension] : worsened with knee extension [None] : No relieving factors are noted [de-identified] : 65 year old female presents with persistent left knee pain, which started on 10/19/18. pt states she had 8/10 pain, \par pt had recent swelling in the back of the knee and went to see PCP and was told that she has baker's cyst\par the cyst got smaller now.\par \par pt  went for second opinion, had MRI showed lateral meniscus tear and underwent left knee scope- in late march or so. \par she reports no improvement of pain after sx.\par today the pain is generalized in the knee.  She describes the pain as a sharp, throbbing pain. She notes walking aggravates the pain. Nothing in particular relieves the pain.  She is now also complaining of difficulty standing for long periods of time,  w She has difficulty performing ADL's like doing laudry. Cortisone injection and H.A injection did not help much. taking tylenol and NSAIDs (ibuprofen 800mg) do not help. \par \par \par The patient had 6 operations on her right knee from [id number]. She also had 4 fusions in her neck done on 12/1/18. \par Pt is under pain management receiving lyrica for neck pain but now she is off lyrica for extreme fatigue.\par Pt states the pain management would not give any more percocet.

## 2020-06-04 NOTE — REVIEW OF SYSTEMS
[Joint Pain] : joint pain [Negative] : Heme/Lymph [Joint Swelling] : joint swelling [FreeTextEntry9] : left knee

## 2020-06-15 ENCOUNTER — APPOINTMENT (OUTPATIENT)
Dept: ORTHOPEDIC SURGERY | Facility: CLINIC | Age: 65
End: 2020-06-15
Payer: MEDICARE

## 2020-06-15 VITALS
SYSTOLIC BLOOD PRESSURE: 114 MMHG | HEART RATE: 73 BPM | WEIGHT: 150 LBS | HEIGHT: 62 IN | TEMPERATURE: 97.4 F | DIASTOLIC BLOOD PRESSURE: 75 MMHG | BODY MASS INDEX: 27.6 KG/M2

## 2020-06-15 DIAGNOSIS — M17.10 UNILATERAL PRIMARY OSTEOARTHRITIS, UNSPECIFIED KNEE: ICD-10-CM

## 2020-06-15 PROCEDURE — 20610 DRAIN/INJ JOINT/BURSA W/O US: CPT | Mod: LT

## 2020-06-15 PROCEDURE — 99213 OFFICE O/P EST LOW 20 MIN: CPT | Mod: 25

## 2020-06-22 ENCOUNTER — APPOINTMENT (OUTPATIENT)
Dept: ORTHOPEDIC SURGERY | Facility: CLINIC | Age: 65
End: 2020-06-22
Payer: MEDICARE

## 2020-06-22 VITALS — TEMPERATURE: 96 F

## 2020-06-22 PROCEDURE — 20610 DRAIN/INJ JOINT/BURSA W/O US: CPT | Mod: LT

## 2020-06-22 RX ORDER — DIAZEPAM 10 MG/1
10 TABLET ORAL
Qty: 4 | Refills: 0 | Status: ACTIVE | COMMUNITY
Start: 2020-04-22

## 2020-06-22 RX ORDER — GABAPENTIN 800 MG/1
800 TABLET, COATED ORAL
Qty: 30 | Refills: 0 | Status: ACTIVE | COMMUNITY
Start: 2020-05-28

## 2020-06-22 RX ORDER — CELECOXIB 200 MG/1
200 CAPSULE ORAL
Qty: 30 | Refills: 0 | Status: ACTIVE | COMMUNITY
Start: 2020-05-21

## 2020-06-22 RX ORDER — GABAPENTIN 100 MG/1
100 CAPSULE ORAL
Qty: 270 | Refills: 0 | Status: ACTIVE | COMMUNITY
Start: 2020-03-10

## 2020-06-22 RX ORDER — SULFAMETHOXAZOLE AND TRIMETHOPRIM 800; 160 MG/1; MG/1
800-160 TABLET ORAL
Qty: 14 | Refills: 0 | Status: ACTIVE | COMMUNITY
Start: 2020-06-10

## 2020-06-26 NOTE — PROCEDURE
[de-identified] : pt received left knee euflexxa injection\par \par Knee injection viscosupplementation: I discussed at length with the patient the planned steroid and lidocaine injection for primary osteoarthritis. The risks, benefits, convalescence and alternatives were reviewed and pt consented for injection. The possible side effects discussed included but were not limited to: pain, swelling, heat and redness. There symptoms are generally mild but if they are extensive then contact the office. Giving pain relievers by mouth such as NSAID's or Tylenol can generally treat the reactions to injection. Rare cases of infection have been noted. Rash, hives and itching may occur post injection. If you have muscle pain or cramps, flushing and or swelling of the face, rapid heart beat, nausea, dizziness, fever, chills, headache, difficulty breathing, swelling in the arms or legs, or have a prickly feeling of your skin, contact a health care provider immediately. Following this discussion, the knee was prepped with alcohol and under sterile condition the injection was performed through a superolateral injection site with a 20 gauge needle. The needle was introduced into the joint, aspiration was performed to ensure intra-articular placement and the medication was injected. Upon withdrawal of the needle the site was cleaned with alcohol and a band aid applied. The patient tolerated the injection well and there were no adverse effects. Post injection instructions included no strenuous activity for 24 hours, cryotherapy and if there are any adverse effects to contact the office. \par

## 2020-06-26 NOTE — DISCUSSION/SUMMARY
[de-identified] : 64 y/o F with chronic persistent knee pain. Pt has previously failed to respond to cortisone injections. She is received 2/3 hyaluronic acid injection and she is also interested in left knee replacement in the future. We discussed the likelihood of persistent pain in her left knee prior to the injections, and if she does not respond to conservative therapy. Pt may also be a candidate for left hip replacement in the future based on imaging results. \par \par The patient is a 65 year old individual with end stage arthritis of their left knee joint. Based upon the patient's continued symptoms and failure to respond to conservative treatment I have recommended a left knee replacement for this patient. A long discussion took place with the patient describing what a total joint replacement is and what the procedure would entail. A total knee model, similar to the implant that will be used during the operation, was utilized to demonstrate and to discuss the various bearing surfaces of the implants. The hospitalization and post-operative care and rehabilitation were also discussed. The use of perioperative antibiotics and DVT prophylaxis were discussed. The risk, benefits and alternatives to a surgical intervention were discussed at length with the patient. The patient was also advised of risks related to the medical comorbidities and elevated body mass index (BMI).  A lengthy discussion took place to review the most common complications including but not limited to: deep vein thrombosis, pulmonary embolus, heart attack, stroke, infection, wound breakdown, numbness, damage to nerves, tendon, muscles, arteries or other blood vessels, death and other possible complications from anesthesia. The patient was told that we will take steps to minimize these risks by using sterile technique, antibiotics and DVT prophylaxis when appropriate and follow the patient postoperatively in the office setting to monitor progress. The possibility of recurrent pain, no improvement in pain and actual worsening of pain were also discussed with the patient.\par The discharge plan of care focused on the patient going home following surgery.  The patient was encouraged to make the necessary arrangements to have someone stay with them when they are discharged home.  Following discharge, a home care nurse will visit the patient.  The home care nurse will open your home care case and request home physical therapy services.  Home physical therapy will commence following discharge provided it is appropriate and covered by the health insurance benefit plan. \par The benefits of surgery were discussed with the patient including the potential for improving his/her current clinical condition through operative intervention. Alternatives to surgical intervention including continued conservative management were also discussed in detail. All questions were answered to the satisfaction of the patient. The treatment plan of care, as well as a model of a total knee equivalent to the one that will be used for their total joint replacement, was shared with the patient.  The patient agreed to the plan of care as well as the use of implants in their total joint replacement.\par

## 2020-06-29 ENCOUNTER — APPOINTMENT (OUTPATIENT)
Dept: ORTHOPEDIC SURGERY | Facility: CLINIC | Age: 65
End: 2020-06-29
Payer: MEDICARE

## 2020-06-29 VITALS — TEMPERATURE: 97.7 F

## 2020-06-29 PROCEDURE — 20610 DRAIN/INJ JOINT/BURSA W/O US: CPT | Mod: LT

## 2020-06-29 PROCEDURE — 73502 X-RAY EXAM HIP UNI 2-3 VIEWS: CPT | Mod: LT

## 2020-06-29 PROCEDURE — 99214 OFFICE O/P EST MOD 30 MIN: CPT | Mod: 25

## 2020-06-29 NOTE — PROCEDURE
[de-identified] : pt received left knee euflexxa injection\par \par Knee injection viscosupplementation: I discussed at length with the patient the planned steroid and lidocaine injection for primary osteoarthritis. The risks, benefits, convalescence and alternatives were reviewed and pt consented for injection. The possible side effects discussed included but were not limited to: pain, swelling, heat and redness. There symptoms are generally mild but if they are extensive then contact the office. Giving pain relievers by mouth such as NSAID's or Tylenol can generally treat the reactions to injection. Rare cases of infection have been noted. Rash, hives and itching may occur post injection. If you have muscle pain or cramps, flushing and or swelling of the face, rapid heart beat, nausea, dizziness, fever, chills, headache, difficulty breathing, swelling in the arms or legs, or have a prickly feeling of your skin, contact a health care provider immediately. Following this discussion, the knee was prepped with alcohol and under sterile condition the injection was performed through a superolateral injection site with a 20 gauge needle. The needle was introduced into the joint, aspiration was performed to ensure intra-articular placement and the medication was injected. Upon withdrawal of the needle the site was cleaned with alcohol and a band aid applied. The patient tolerated the injection well and there were no adverse effects. Post injection instructions included no strenuous activity for 24 hours, cryotherapy and if there are any adverse effects to contact the office. \par

## 2020-06-29 NOTE — PHYSICAL EXAM
[ALL] : dorsalis pedis, posterior tibial, femoral, popliteal, and radial 2+ and symmetric bilaterally [LE] : Sensory: Intact in bilateral lower extremities [Antalgic] : not antalgic [de-identified] : General: well-appearing, not in acute distress and not obese. \par GENERAL APPEARANCE: Well nourished and hydrated, pleasant, alert, and oriented x 3. Appears their stated age. \par HEENT: Normocephalic, extraocular eye motion intact. Nasal septum midline. Oral cavity clear. External auditory canal clear. \par CARDIOVASCULAR: No apparent abnormalities. No lower leg edema. No varicosities. Pedal pulses are palpable.\par NEUROLOGIC: Sensation is normal, no muscle weakness in the upper or lower extremities.\par DERMATOLOGIC: No apparent skin lesions, moist, warm, no rash.\par \par MUSCULOSKELETAL: Hands, wrists, and elbows are normal and move freely, shoulders are normal and move freely. \par Examination of the right knee shows a healed incision she has a moderate knee effusion range of motion is good full extension present flexion to 140° no instability noted, She does not have any knee instability. \par \par left knee examination shows healed the scope side slight varus alignment no joint effusion full range of motion without significant crepitus.\par left hip examination showed preserved right hip ROM. no groin pain with SLR.\par \par Motor: 5/5 motor strength in bilateral upper & lower extremities. Sensory: Intact in bilateral upper & lower extremities. DTRs: Biceps, brachioradialis, triceps, patellar, ankle and plantar 2+ and symmetric bilaterally. Pulses: dorsalis pedis, posterior tibial, femoral, popliteal, and radial 2+ and symmetric bilaterally. \par  [de-identified] : left hip 3 veiw xrays were taken which showed moderate left hip O.A

## 2020-06-29 NOTE — DISCUSSION/SUMMARY
[de-identified] : 66 y/o F with chronic persistent knee pain. Pt has previously failed to respond to cortisone injections. She is received 3/3 hyaluronic acid injection and she is also interested in left knee replacement in the future. We discussed the likelihood of persistent pain in her left knee prior to the injections, and if she does not respond to conservative therapy. \par regards to persistent left hip pain, pt is interested in left hip replacement. pt reports severe deterioration of her quality of life. we ordered left hip MRI to r/o full thickness cartilage loss, if the MRI showed severe chondral damage, we will offer her left RITIKA. we will f/u with tele medicine for review of left hip MRI. \par \par The patient is a 65 year old individual with end stage arthritis of their left knee joint. Based upon the patient's continued symptoms and failure to respond to conservative treatment I have recommended a left knee replacement for this patient. A long discussion took place with the patient describing what a total joint replacement is and what the procedure would entail. A total knee model, similar to the implant that will be used during the operation, was utilized to demonstrate and to discuss the various bearing surfaces of the implants. The hospitalization and post-operative care and rehabilitation were also discussed. The use of perioperative antibiotics and DVT prophylaxis were discussed. The risk, benefits and alternatives to a surgical intervention were discussed at length with the patient. The patient was also advised of risks related to the medical comorbidities and elevated body mass index (BMI).  A lengthy discussion took place to review the most common complications including but not limited to: deep vein thrombosis, pulmonary embolus, heart attack, stroke, infection, wound breakdown, numbness, damage to nerves, tendon, muscles, arteries or other blood vessels, death and other possible complications from anesthesia. The patient was told that we will take steps to minimize these risks by using sterile technique, antibiotics and DVT prophylaxis when appropriate and follow the patient postoperatively in the office setting to monitor progress. The possibility of recurrent pain, no improvement in pain and actual worsening of pain were also discussed with the patient.\par The discharge plan of care focused on the patient going home following surgery.  The patient was encouraged to make the necessary arrangements to have someone stay with them when they are discharged home.  Following discharge, a home care nurse will visit the patient.  The home care nurse will open your home care case and request home physical therapy services.  Home physical therapy will commence following discharge provided it is appropriate and covered by the health insurance benefit plan. \par The benefits of surgery were discussed with the patient including the potential for improving his/her current clinical condition through operative intervention. Alternatives to surgical intervention including continued conservative management were also discussed in detail. All questions were answered to the satisfaction of the patient. The treatment plan of care, as well as a model of a total knee equivalent to the one that will be used for their total joint replacement, was shared with the patient.  The patient agreed to the plan of care as well as the use of implants in their total joint replacement.\par

## 2020-06-29 NOTE — HISTORY OF PRESENT ILLNESS
[Pain Location] : pain [Worsening] : worsening [5] : a current pain level of 5/10 [7] : a maximum pain level of 7/10 [Hip Movement] : worsened by hip movement [Walking] : worsened by walking [de-identified] : pt c/o worsening  left hip pain that is worsening\par she report groin pain, \par she had trouble with claiming stairs \par she had recent fall in the bathroom slipped on water then could not get up on her own.\par \par regards to her left knee\par pt had recent swelling in the back of the knee and went to see PCP and was told that she has baker's cyst\par the cyst got smaller now.\par \par pt went for second opinion last year, had MRI showed lateral meniscus tear and underwent left knee scope- in late march or so. \par she reports no improvement of pain after sx.\par today the pain is generalized in the knee. She describes the pain as a sharp, throbbing pain. She notes walking aggravates the pain. Nothing in particular relieves the pain. She is now also complaining of difficulty standing for long periods of time, w She has difficulty performing ADL's like doing laudry. Cortisone injection and H.A injection did not help much. taking tylenol and NSAIDs (ibuprofen 800mg) do not help. \par

## 2020-07-15 ENCOUNTER — OUTPATIENT (OUTPATIENT)
Dept: OUTPATIENT SERVICES | Facility: HOSPITAL | Age: 65
LOS: 1 days | End: 2020-07-15

## 2020-07-15 ENCOUNTER — APPOINTMENT (OUTPATIENT)
Dept: MRI IMAGING | Facility: CLINIC | Age: 65
End: 2020-07-15
Payer: MEDICARE

## 2020-07-15 DIAGNOSIS — Z98.89 OTHER SPECIFIED POSTPROCEDURAL STATES: Chronic | ICD-10-CM

## 2020-07-15 DIAGNOSIS — M16.12 UNILATERAL PRIMARY OSTEOARTHRITIS, LEFT HIP: ICD-10-CM

## 2020-07-15 DIAGNOSIS — Z96.659 PRESENCE OF UNSPECIFIED ARTIFICIAL KNEE JOINT: Chronic | ICD-10-CM

## 2020-07-15 DIAGNOSIS — L05.91 PILONIDAL CYST WITHOUT ABSCESS: Chronic | ICD-10-CM

## 2020-07-15 PROCEDURE — 73721 MRI JNT OF LWR EXTRE W/O DYE: CPT | Mod: 26,LT

## 2020-07-27 ENCOUNTER — APPOINTMENT (OUTPATIENT)
Dept: ORTHOPEDIC SURGERY | Facility: CLINIC | Age: 65
End: 2020-07-27
Payer: MEDICARE

## 2020-07-27 VITALS — TEMPERATURE: 99.1 F

## 2020-07-27 DIAGNOSIS — M16.12 UNILATERAL PRIMARY OSTEOARTHRITIS, LEFT HIP: ICD-10-CM

## 2020-07-27 PROCEDURE — 99214 OFFICE O/P EST MOD 30 MIN: CPT

## 2020-07-27 RX ORDER — TRAMADOL HYDROCHLORIDE 50 MG/1
50 TABLET, COATED ORAL
Qty: 21 | Refills: 0 | Status: ACTIVE | COMMUNITY
Start: 2020-07-27 | End: 1900-01-01

## 2020-07-27 NOTE — DISCUSSION/SUMMARY
[de-identified] : 64 y/o F with end stage left hip osteoarthritis with persistent pain. Conservative therapy and surgical options discussed in detail with patient. The pt is a candidate for a left RITIKA and would like to pursue a left RITIKA. She scheduled surgery today. She can continue taking ibuprofen. We prescribed her Tramadol. Additionally, we discussed that she should get her left TKA once she is completely healed from the left RITIKA. \par \par The patient is a 65 year individual with end stage arthritis of their left hip joint. Based upon the patient's continued symptoms and failure to respond to conservative treatment I have recommended a left total hip arthroplasty for this patient. A long discussion took place with the patient describing what a total joint replacement is and what the procedure would entail. A total hip arthroplasty model, similar to the implant that will be used during the operation, was utilized to demonstrate and to discuss the various bearing surfaces of the implants. The hospitalization and post-operative care and rehabilitation were also discussed. The use of perioperative antibiotics and DVT prophylaxis were discussed. The risk, benefits and alternatives to a surgical intervention were discussed at length with the patient. The patient was also advised of risks related to the medical comorbidities, elevated body mass index (BMI), and smoking where applicable. We discussed how to reduce modifiable risk factors and encouraged smoking cessation were applicable.. A lengthy discussion took place to review the most common complications including but not limited to: deep vein thrombosis, pulmonary embolus, heart attack, stroke, infection, wound breakdown, numbness, damage to nerves, tendon, muscles, arteries or other blood vessels, death and other possible complications from anesthesia. The patient was told that we will take steps to minimize these risks by using sterile technique, antibiotics and DVT prophylaxis when appropriate and follow the patient postoperatively in the office setting to monitor progress. The possibility of recurrent pain, no improvement in pain and actual worsening of pain were also discussed with the patient.\par The discharge plan of care focused on the patient going home following surgery. The patient was encouraged to make the necessary arrangements to have someone stay with them when they are discharged home. Following discharge, a home care nurse will visit the patient. The home care nurse will open your home care case and request home physical therapy services. Home physical therapy will commence following discharge provided it is appropriate and covered by the health insurance benefit plan. \par The benefits of surgery were discussed with the patient including the potential for improving her current clinical condition through operative intervention. Alternatives to surgical intervention including continued conservative management were also discussed in detail. All questions were answered to the satisfaction of the patient. The treatment plan of care, as well as a model of a total hip arthroplasty equivalent to the one that will be used for their total joint replacement, was shared with the patient. The patient agreed to the plan of care as well as the use of implants in their total joint replacement. \par \par

## 2020-07-27 NOTE — END OF VISIT
[FreeTextEntry3] : I, Devan Farah, acted solely as a scribe for Dr. German Guerrero on this date 07/27/2020.

## 2020-07-27 NOTE — HISTORY OF PRESENT ILLNESS
[Pain Location] : pain [Worsening] : worsening [5] : a current pain level of 5/10 [7] : a maximum pain level of 7/10 [Hip Movement] : worsened by hip movement [Walking] : worsened by walking [de-identified] : 66 y/o F presents with left hip pain. Pt c/o worsening left hip pain. She reports groin pain. She had trouble with claiming stairs. She had a recent fall in the bathroom due to slipping on water but could not get up on her own. Pt had recent swelling in the back of the knee and went to see PCP and was told that she has Baker's cyst. The cyst got smaller now. Pt went for second opinion last year, had MRI showed lateral meniscus tear and underwent left knee scope- in late march or so. She reports no improvement of pain after sx. Today the pain is generalized in the knee. She describes the pain as a sharp, throbbing pain. She notes walking aggravates the pain. Nothing in particular relieves the pain. She is now also complaining of difficulty standing for long periods of time. She has difficulty performing ADL's like doing laundry. Cortisone injection and H.A injection did not help much. taking Tylenol and NSAIDs (ibuprofen 800mg) do not help.

## 2020-07-27 NOTE — PHYSICAL EXAM
[de-identified] : GENERAL APPEARANCE: Well nourished and hydrated, pleasant, alert, and oriented x 3. Appears their stated age. \par HEENT: Normocephalic, extraocular eye motion intact. Nasal septum midline. Oral cavity clear. External auditory canal clear. \par RESPIRATORY: Breath sounds clear and audible in all lobes. No wheezing, No accessory muscle use.\par CARDIOVASCULAR: No apparent abnormalities. No lower leg edema. No varicosities. Pedal pulses are palpable.\par NEUROLOGIC: Sensation is normal, no muscle weakness in the upper or lower extremities.\par DERMATOLOGIC: No apparent skin lesions, moist, warm, no rash.\par SPINE: Cervical spine appears normal and moves freely; thoracic spine appears normal and moves freely; lumbosacral spine appears normal and moves freely, normal, nontender.\par MUSCULOSKELETAL: Hands, wrists, and elbows are normal and move freely, shoulders are normal and move freely. \par Musculoskeletal: Gait: normal.  [de-identified] : Left hip exam shows tenderness with ROM. [de-identified] : MRI left hip performed on 7/15/2020 at [facility] reviewed by Dr. Ravin Black shows the following:\par \par Severe arthritic changes of the left hip with small subchondral edema/cystic changes in the left acetabulum and thinning of cartilage in the superior femoral head. No definite full-thickness cartilage loss in the left femoral head.\par \par There is likely tearing of the left hip anterior and anterior superior labrum, evaluation limited on large field of view images. Nonspecific septated T2 hyperintense structure at the anterior aspect of the left hip overlying the labrum measuring up to 1.7 cm in axial plane, possible paralabral cysts or ganglion.\par \par Mild tendinosis of the left gluteus minimus tendinous insertion.\par \par Small left hip joint effusion.\par \par Severe disc degenerative changes at L4-L5, evaluation limited on coronal images only. If clinically indicated, dedicated MRI of the lumbar spine can be performed for further evaluation.\par

## 2020-07-27 NOTE — REASON FOR VISIT
[Follow-Up Visit] : a follow-up visit for [Hip Pain] : hip pain [FreeTextEntry2] : Left hip pain. Here to discuss MRI results.

## 2020-08-10 ENCOUNTER — APPOINTMENT (OUTPATIENT)
Dept: DERMATOLOGY | Facility: CLINIC | Age: 65
End: 2020-08-10

## 2020-08-27 RX ORDER — TRAMADOL HYDROCHLORIDE 50 MG/1
50 TABLET, COATED ORAL EVERY 8 HOURS
Qty: 12 | Refills: 0 | Status: ACTIVE | COMMUNITY
Start: 2020-08-27 | End: 1900-01-01

## 2020-08-27 RX ORDER — TRAMADOL HYDROCHLORIDE 50 MG/1
1 TABLET ORAL
Qty: 0 | Refills: 0 | DISCHARGE
Start: 2020-08-27

## 2020-09-17 ENCOUNTER — OUTPATIENT (OUTPATIENT)
Dept: OUTPATIENT SERVICES | Facility: HOSPITAL | Age: 65
LOS: 1 days | End: 2020-09-17
Payer: MEDICARE

## 2020-09-17 ENCOUNTER — RESULT REVIEW (OUTPATIENT)
Age: 65
End: 2020-09-17

## 2020-09-17 VITALS
TEMPERATURE: 97 F | HEART RATE: 86 BPM | RESPIRATION RATE: 20 BRPM | WEIGHT: 147.49 LBS | DIASTOLIC BLOOD PRESSURE: 80 MMHG | SYSTOLIC BLOOD PRESSURE: 120 MMHG | HEIGHT: 63 IN

## 2020-09-17 DIAGNOSIS — Z98.89 OTHER SPECIFIED POSTPROCEDURAL STATES: Chronic | ICD-10-CM

## 2020-09-17 DIAGNOSIS — Z29.9 ENCOUNTER FOR PROPHYLACTIC MEASURES, UNSPECIFIED: ICD-10-CM

## 2020-09-17 DIAGNOSIS — Z01.818 ENCOUNTER FOR OTHER PREPROCEDURAL EXAMINATION: ICD-10-CM

## 2020-09-17 DIAGNOSIS — L05.91 PILONIDAL CYST WITHOUT ABSCESS: Chronic | ICD-10-CM

## 2020-09-17 DIAGNOSIS — Z96.659 PRESENCE OF UNSPECIFIED ARTIFICIAL KNEE JOINT: Chronic | ICD-10-CM

## 2020-09-17 DIAGNOSIS — M16.12 UNILATERAL PRIMARY OSTEOARTHRITIS, LEFT HIP: ICD-10-CM

## 2020-09-17 DIAGNOSIS — R94.31 ABNORMAL ELECTROCARDIOGRAM [ECG] [EKG]: ICD-10-CM

## 2020-09-17 LAB
A1C WITH ESTIMATED AVERAGE GLUCOSE RESULT: 5.6 % — SIGNIFICANT CHANGE UP (ref 4–5.6)
ANION GAP SERPL CALC-SCNC: 9 MMOL/L — SIGNIFICANT CHANGE UP (ref 5–17)
APTT BLD: 47.3 SEC — HIGH (ref 27.5–35.5)
BASOPHILS # BLD AUTO: 0.03 K/UL — SIGNIFICANT CHANGE UP (ref 0–0.2)
BASOPHILS NFR BLD AUTO: 0.5 % — SIGNIFICANT CHANGE UP (ref 0–2)
BLD GP AB SCN SERPL QL: SIGNIFICANT CHANGE UP
BUN SERPL-MCNC: 16 MG/DL — SIGNIFICANT CHANGE UP (ref 8–20)
CALCIUM SERPL-MCNC: 9.7 MG/DL — SIGNIFICANT CHANGE UP (ref 8.6–10.2)
CHLORIDE SERPL-SCNC: 104 MMOL/L — SIGNIFICANT CHANGE UP (ref 98–107)
CO2 SERPL-SCNC: 26 MMOL/L — SIGNIFICANT CHANGE UP (ref 22–29)
CREAT SERPL-MCNC: 0.75 MG/DL — SIGNIFICANT CHANGE UP (ref 0.5–1.3)
EOSINOPHIL # BLD AUTO: 0.05 K/UL — SIGNIFICANT CHANGE UP (ref 0–0.5)
EOSINOPHIL NFR BLD AUTO: 0.9 % — SIGNIFICANT CHANGE UP (ref 0–6)
ESTIMATED AVERAGE GLUCOSE: 114 MG/DL — SIGNIFICANT CHANGE UP (ref 68–114)
GLUCOSE SERPL-MCNC: 79 MG/DL — SIGNIFICANT CHANGE UP (ref 70–99)
HCT VFR BLD CALC: 43.8 % — SIGNIFICANT CHANGE UP (ref 34.5–45)
HGB BLD-MCNC: 14.5 G/DL — SIGNIFICANT CHANGE UP (ref 11.5–15.5)
IMM GRANULOCYTES NFR BLD AUTO: 0.3 % — SIGNIFICANT CHANGE UP (ref 0–1.5)
INR BLD: 1.01 RATIO — SIGNIFICANT CHANGE UP (ref 0.88–1.16)
LYMPHOCYTES # BLD AUTO: 1.84 K/UL — SIGNIFICANT CHANGE UP (ref 1–3.3)
LYMPHOCYTES # BLD AUTO: 31.8 % — SIGNIFICANT CHANGE UP (ref 13–44)
MCHC RBC-ENTMCNC: 31.7 PG — SIGNIFICANT CHANGE UP (ref 27–34)
MCHC RBC-ENTMCNC: 33.1 GM/DL — SIGNIFICANT CHANGE UP (ref 32–36)
MCV RBC AUTO: 95.6 FL — SIGNIFICANT CHANGE UP (ref 80–100)
MONOCYTES # BLD AUTO: 0.37 K/UL — SIGNIFICANT CHANGE UP (ref 0–0.9)
MONOCYTES NFR BLD AUTO: 6.4 % — SIGNIFICANT CHANGE UP (ref 2–14)
MRSA PCR RESULT.: SIGNIFICANT CHANGE UP
NEUTROPHILS # BLD AUTO: 3.48 K/UL — SIGNIFICANT CHANGE UP (ref 1.8–7.4)
NEUTROPHILS NFR BLD AUTO: 60.1 % — SIGNIFICANT CHANGE UP (ref 43–77)
PLATELET # BLD AUTO: 186 K/UL — SIGNIFICANT CHANGE UP (ref 150–400)
POTASSIUM SERPL-MCNC: 4.9 MMOL/L — SIGNIFICANT CHANGE UP (ref 3.5–5.3)
POTASSIUM SERPL-SCNC: 4.9 MMOL/L — SIGNIFICANT CHANGE UP (ref 3.5–5.3)
PROTHROM AB SERPL-ACNC: 11.7 SEC — SIGNIFICANT CHANGE UP (ref 10.6–13.6)
RBC # BLD: 4.58 M/UL — SIGNIFICANT CHANGE UP (ref 3.8–5.2)
RBC # FLD: 13.1 % — SIGNIFICANT CHANGE UP (ref 10.3–14.5)
S AUREUS DNA NOSE QL NAA+PROBE: SIGNIFICANT CHANGE UP
SODIUM SERPL-SCNC: 139 MMOL/L — SIGNIFICANT CHANGE UP (ref 135–145)
WBC # BLD: 5.79 K/UL — SIGNIFICANT CHANGE UP (ref 3.8–10.5)
WBC # FLD AUTO: 5.79 K/UL — SIGNIFICANT CHANGE UP (ref 3.8–10.5)

## 2020-09-17 PROCEDURE — 71046 X-RAY EXAM CHEST 2 VIEWS: CPT | Mod: 26

## 2020-09-17 PROCEDURE — G0463: CPT

## 2020-09-17 PROCEDURE — 93005 ELECTROCARDIOGRAM TRACING: CPT

## 2020-09-17 PROCEDURE — 71046 X-RAY EXAM CHEST 2 VIEWS: CPT

## 2020-09-17 PROCEDURE — 93010 ELECTROCARDIOGRAM REPORT: CPT

## 2020-09-17 RX ORDER — SODIUM CHLORIDE 9 MG/ML
3 INJECTION INTRAMUSCULAR; INTRAVENOUS; SUBCUTANEOUS EVERY 8 HOURS
Refills: 0 | Status: DISCONTINUED | OUTPATIENT
Start: 2020-10-01 | End: 2020-10-03

## 2020-09-17 RX ORDER — ALPRAZOLAM 0.25 MG
0 TABLET ORAL
Qty: 0 | Refills: 0 | DISCHARGE

## 2020-09-17 NOTE — PATIENT PROFILE ADULT - NSPROEDALEARNPREF_GEN_A_NUR
written material/verbal instruction/individual instruction/Pre op teaching surgical scrub pain management instructions given to pt    covid swab to be done Sept 28

## 2020-09-17 NOTE — H&P PST ADULT - NSICDXFAMILYHX_GEN_ALL_CORE_FT
FAMILY HISTORY:  Family history of coronary artery disease    Sibling  Still living? Yes, Estimated age: 51-60  Family history of diabetes mellitus, Age at diagnosis: 51-60  Family history of diabetes mellitus, Age at diagnosis: 51-60    Uncle  Still living? Unknown  Family history of diabetes mellitus, Age at diagnosis: Age Unknown
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
## 2020-09-17 NOTE — H&P PST ADULT - HISTORY OF PRESENT ILLNESS
64 y/o female with anxiety, OA, Chronic back and neck pain, HLD, TIA( About nine years ago) Seen today for pre-op left total hip replacement. Pt report left knee and left hip pain for more than two years, left hip pain  has gotten worse over the past couple of months. Pt report pain worse on ambulation,  climbing stairs and ADLs, recently slipped and  fell  in the bathroom due to unsteady gait, report left hip pain radiates to left groin,  anterior left thigh and left sided lower back.  Pt underwent MRI (7/15/2020) that revealed severe arthritic changes of the left hip. Report pain slightly relieved with Ibuprofen 800mg prn and rest. Pt report prior conventional treatment of cortisone injections with poor efficacy, seen  today for a scheduled surgery with Dr. Guerrero

## 2020-09-17 NOTE — PATIENT PROFILE ADULT - NSPREOP1_ABLETOREACHPT_GEN_A_NUR
Denies domestic or international travel in the past 3 weeks    [phone] Denies domestic or international travel in the past 3 weeks    [phone]/yes

## 2020-09-17 NOTE — H&P PST ADULT - ASSESSMENT
64 y/o female with anxiety, OA, Chronic back and neck pain, HLD, TIA( About nine years ago) Seen today for pre-op left total hip replacement. Pt report left knee and left hip pain for more than two years, left hip pain  has gotten worse over the past couple of months. Pt report pain worse on ambulation,  climbing stairs and ADLs, recently slipped and  fell  in the bathroom due to unsteady gait, report left hip pain radiates to left groin,  anterior left thigh and left sided lower back.  Pt underwent MRI (7/15/2020) that revealed severe arthritic changes of the left hip. Report pain slightly relieved with Ibuprofen 800mg prn and rest. Pt report prior conventional treatment of cortisone injections with poor efficacy, seen  today for a scheduled surgery with Dr. Guerrero. Surgery protocol reviewed with pt today. Pt already scheduled appointment for clearances with PCP and cardiologist.   CAPRINI VTE 2.0 SCORE [CLOT updated 2019]    AGE RELATED RISK FACTORS                                                       MOBILITY RELATED FACTORS  [ ] Age 41-60 years                                            (1 Point)                    [ ] Bed rest                                                        (1 Point)  [ x] Age: 61-74 years                                           (2 Points)                  [ ] Plaster cast                                                   (2 Points)  [ ] Age= 75 years                                              (3 Points)                    [ ] Bed bound for more than 72 hours                 (2 Points)    DISEASE RELATED RISK FACTORS                                               GENDER SPECIFIC FACTORS  [ ] Edema in the lower extremities                       (1 Point)              [ ] Pregnancy                                                     (1 Point)  [ ] Varicose veins                                               (1 Point)                     [ ] Post-partum < 6 weeks                                   (1 Point)             [ x] BMI > 25 Kg/m2                                            (1 Point)                     [ ] Hormonal therapy  or oral contraception          (1 Point)                 [ ] Sepsis (in the previous month)                        (1 Point)               [ ] History of pregnancy complications                 (1 point)  [ ] Pneumonia or serious lung disease                                               [ ] Unexplained or recurrent                     (1 Point)           (in the previous month)                               (1 Point)  [ ] Abnormal pulmonary function test                     (1 Point)                 SURGERY RELATED RISK FACTORS  [ ] Acute myocardial infarction                              (1 Point)               [ ]  Section                                             (1 Point)  [ ] Congestive heart failure (in the previous month)  (1 Point)      [ ] Minor surgery                                                  (1 Point)   [ ] Inflammatory bowel disease                             (1 Point)               [ ] Arthroscopic surgery                                        (2 Points)  [ ] Central venous access                                      (2 Points)                [ ] General surgery lasting more than 45 minutes (2 points)  [ ] Malignancy- Present or previous                   (2 Points)                [x ] Elective arthroplasty                                         (5 points)    [ ] Stroke (in the previous month)                          (5 Points)                                                                                                                                                           HEMATOLOGY RELATED FACTORS                                                 TRAUMA RELATED RISK FACTORS  [ ] Prior episodes of VTE                                     (3 Points)                [ ] Fracture of the hip, pelvis, or leg                       (5 Points)  [ ] Positive family history for VTE                         (3 Points)             [ ] Acute spinal cord injury (in the previous month)  (5 Points)  [ ] Prothrombin 26258 A                                     (3 Points)               [ ] Paralysis  (less than 1 month)                             (5 Points)  [ ] Factor V Leiden                                             (3 Points)                  [ ] Multiple Trauma within 1 month                        (5 Points)  [ ] Lupus anticoagulants                                     (3 Points)                                                           [ ] Anticardiolipin antibodies                               (3 Points)                                                       [ ] High homocysteine in the blood                      (3 Points)                                             [ ] Other congenital or acquired thrombophilia      (3 Points)                                                [ ] Heparin induced thrombocytopenia                  (3 Points)                                     Total Score [    8      ]   OPIOID RISK TOOL    MIYA EACH BOX THAT APPLIES AND ADD TOTALS AT THE END    FAMILY HISTORY OF SUBSTANCE ABUSE                 FEMALE         MALE                                                Alcohol                             [  ]1 pt          [  ]3pts                                               Illegal Durgs                     [  ]2 pts        [  ]3pts                                               Rx Drugs                           [  ]4 pts        [  ]4 pts    PERSONAL HISTORY OF SUBSTANCE ABUSE                                                                                          Alcohol                             [  ]3 pts       [  ]3 pts                                               Illegal Drugs                     [  ]4 pts        [  ]4 pts                                               Rx Drugs                           [  ]5 pts        [  ]5 pts    AGE BETWEEN 16-45 YEARS                                      [  ]1 pt         [  ]1 pt    HISTORY OF PREADOLESCENT   SEXUAL ABUSE                                                             [  ]3 pts        [  ]0pts    PSYCHOLOGICAL DISEASE                     ADD, OCD, Bipolar, Schizophrenia        [  ]2 pts         [  ]2 pts                      Depression                                               [  ]1 pt           [  ]1 pt           SCORING TOTAL   (add numbers and type here)              (*0**)                                     A score of 3 or lower indicated LOW risk for future opioid abuse  A score of 4 to 7 indicated moderate risk for future opioid abuse  A score of 8 or higher indicates a high risk for opioid abuse

## 2020-09-17 NOTE — H&P PST ADULT - NSICDXPROBLEM_GEN_ALL_CORE_FT
PROBLEM DIAGNOSES  Problem: Abnormal EKG  Assessment and Plan: f/u with cardiologist    Problem: Unilateral primary osteoarthritis, left hip  Assessment and Plan: Left hip total replacement. f/u with PCP     Problem: Need for prophylactic measure  Assessment and Plan: high risk surgical team to determine prophylactic intervention

## 2020-09-17 NOTE — H&P PST ADULT - NSICDXPASTSURGICALHX_GEN_ALL_CORE_FT
PAST SURGICAL HISTORY:  H/O total knee replacement 2015- Right knee    Pilonidal cyst     S/P dilatation and curettage x2    S/P knee surgery right x5

## 2020-09-27 DIAGNOSIS — Z01.818 ENCOUNTER FOR OTHER PREPROCEDURAL EXAMINATION: ICD-10-CM

## 2020-09-28 ENCOUNTER — APPOINTMENT (OUTPATIENT)
Dept: DISASTER EMERGENCY | Facility: CLINIC | Age: 65
End: 2020-09-28

## 2020-09-29 LAB — SARS-COV-2 N GENE NPH QL NAA+PROBE: NOT DETECTED

## 2020-09-30 ENCOUNTER — TRANSCRIPTION ENCOUNTER (OUTPATIENT)
Age: 65
End: 2020-09-30

## 2020-10-01 ENCOUNTER — TRANSCRIPTION ENCOUNTER (OUTPATIENT)
Age: 65
End: 2020-10-01

## 2020-10-01 ENCOUNTER — INPATIENT (INPATIENT)
Facility: HOSPITAL | Age: 65
LOS: 1 days | Discharge: HOME | DRG: 470 | End: 2020-10-03
Attending: ORTHOPAEDIC SURGERY | Admitting: ORTHOPAEDIC SURGERY
Payer: MEDICARE

## 2020-10-01 ENCOUNTER — APPOINTMENT (OUTPATIENT)
Dept: ORTHOPEDIC SURGERY | Facility: HOSPITAL | Age: 65
End: 2020-10-01

## 2020-10-01 VITALS
SYSTOLIC BLOOD PRESSURE: 102 MMHG | HEIGHT: 64 IN | TEMPERATURE: 98 F | WEIGHT: 134.92 LBS | RESPIRATION RATE: 16 BRPM | HEART RATE: 80 BPM | OXYGEN SATURATION: 95 % | DIASTOLIC BLOOD PRESSURE: 73 MMHG

## 2020-10-01 DIAGNOSIS — M16.12 UNILATERAL PRIMARY OSTEOARTHRITIS, LEFT HIP: ICD-10-CM

## 2020-10-01 DIAGNOSIS — Z98.89 OTHER SPECIFIED POSTPROCEDURAL STATES: Chronic | ICD-10-CM

## 2020-10-01 DIAGNOSIS — L05.91 PILONIDAL CYST WITHOUT ABSCESS: Chronic | ICD-10-CM

## 2020-10-01 DIAGNOSIS — Z96.659 PRESENCE OF UNSPECIFIED ARTIFICIAL KNEE JOINT: Chronic | ICD-10-CM

## 2020-10-01 LAB
APTT BLD: 49 SEC — HIGH (ref 27.5–35.5)
GLUCOSE BLDC GLUCOMTR-MCNC: 106 MG/DL — HIGH (ref 70–99)
GLUCOSE BLDC GLUCOMTR-MCNC: 152 MG/DL — HIGH (ref 70–99)

## 2020-10-01 PROCEDURE — 27130 TOTAL HIP ARTHROPLASTY: CPT | Mod: LT

## 2020-10-01 PROCEDURE — 73501 X-RAY EXAM HIP UNI 1 VIEW: CPT | Mod: 26,LT

## 2020-10-01 PROCEDURE — 27130 TOTAL HIP ARTHROPLASTY: CPT | Mod: AS,LT

## 2020-10-01 RX ORDER — SODIUM CHLORIDE 9 MG/ML
1000 INJECTION, SOLUTION INTRAVENOUS ONCE
Refills: 0 | Status: COMPLETED | OUTPATIENT
Start: 2020-10-01 | End: 2020-10-01

## 2020-10-01 RX ORDER — CELECOXIB 200 MG/1
400 CAPSULE ORAL ONCE
Refills: 0 | Status: COMPLETED | OUTPATIENT
Start: 2020-10-01 | End: 2020-10-01

## 2020-10-01 RX ORDER — CEFAZOLIN SODIUM 1 G
2000 VIAL (EA) INJECTION
Refills: 0 | Status: COMPLETED | OUTPATIENT
Start: 2020-10-01 | End: 2020-10-02

## 2020-10-01 RX ORDER — QUETIAPINE FUMARATE 200 MG/1
100 TABLET, FILM COATED ORAL AT BEDTIME
Refills: 0 | Status: DISCONTINUED | OUTPATIENT
Start: 2020-10-01 | End: 2020-10-03

## 2020-10-01 RX ORDER — ACETAMINOPHEN 500 MG
975 TABLET ORAL ONCE
Refills: 0 | Status: COMPLETED | OUTPATIENT
Start: 2020-10-01 | End: 2020-10-01

## 2020-10-01 RX ORDER — APREPITANT 80 MG/1
40 CAPSULE ORAL ONCE
Refills: 0 | Status: COMPLETED | OUTPATIENT
Start: 2020-10-01 | End: 2020-10-01

## 2020-10-01 RX ORDER — MAGNESIUM HYDROXIDE 400 MG/1
30 TABLET, CHEWABLE ORAL DAILY
Refills: 0 | Status: DISCONTINUED | OUTPATIENT
Start: 2020-10-01 | End: 2020-10-03

## 2020-10-01 RX ORDER — TRANEXAMIC ACID 100 MG/ML
1000 INJECTION, SOLUTION INTRAVENOUS ONCE
Refills: 0 | Status: DISCONTINUED | OUTPATIENT
Start: 2020-10-01 | End: 2020-10-01

## 2020-10-01 RX ORDER — OXYCODONE HYDROCHLORIDE 5 MG/1
5 TABLET ORAL
Refills: 0 | Status: DISCONTINUED | OUTPATIENT
Start: 2020-10-01 | End: 2020-10-03

## 2020-10-01 RX ORDER — CEFAZOLIN SODIUM 1 G
2000 VIAL (EA) INJECTION ONCE
Refills: 0 | Status: DISCONTINUED | OUTPATIENT
Start: 2020-10-01 | End: 2020-10-01

## 2020-10-01 RX ORDER — SIMVASTATIN 20 MG/1
40 TABLET, FILM COATED ORAL AT BEDTIME
Refills: 0 | Status: DISCONTINUED | OUTPATIENT
Start: 2020-10-01 | End: 2020-10-03

## 2020-10-01 RX ORDER — ONDANSETRON 8 MG/1
4 TABLET, FILM COATED ORAL EVERY 6 HOURS
Refills: 0 | Status: DISCONTINUED | OUTPATIENT
Start: 2020-10-01 | End: 2020-10-03

## 2020-10-01 RX ORDER — ACETAMINOPHEN 500 MG
1000 TABLET ORAL ONCE
Refills: 0 | Status: COMPLETED | OUTPATIENT
Start: 2020-10-01 | End: 2020-10-01

## 2020-10-01 RX ORDER — ASPIRIN/CALCIUM CARB/MAGNESIUM 324 MG
325 TABLET ORAL
Refills: 0 | Status: DISCONTINUED | OUTPATIENT
Start: 2020-10-02 | End: 2020-10-03

## 2020-10-01 RX ORDER — OXYCODONE HYDROCHLORIDE 5 MG/1
10 TABLET ORAL
Refills: 0 | Status: DISCONTINUED | OUTPATIENT
Start: 2020-10-01 | End: 2020-10-03

## 2020-10-01 RX ORDER — ALPRAZOLAM 0.25 MG
1 TABLET ORAL DAILY
Refills: 0 | Status: DISCONTINUED | OUTPATIENT
Start: 2020-10-01 | End: 2020-10-03

## 2020-10-01 RX ORDER — HYDROMORPHONE HYDROCHLORIDE 2 MG/ML
1 INJECTION INTRAMUSCULAR; INTRAVENOUS; SUBCUTANEOUS
Refills: 0 | Status: DISCONTINUED | OUTPATIENT
Start: 2020-10-01 | End: 2020-10-01

## 2020-10-01 RX ORDER — POLYETHYLENE GLYCOL 3350 17 G/17G
17 POWDER, FOR SOLUTION ORAL DAILY
Refills: 0 | Status: DISCONTINUED | OUTPATIENT
Start: 2020-10-01 | End: 2020-10-03

## 2020-10-01 RX ORDER — ONDANSETRON 8 MG/1
4 TABLET, FILM COATED ORAL ONCE
Refills: 0 | Status: DISCONTINUED | OUTPATIENT
Start: 2020-10-01 | End: 2020-10-01

## 2020-10-01 RX ORDER — ACETAMINOPHEN 500 MG
650 TABLET ORAL EVERY 6 HOURS
Refills: 0 | Status: DISCONTINUED | OUTPATIENT
Start: 2020-10-01 | End: 2020-10-03

## 2020-10-01 RX ORDER — HYDROMORPHONE HYDROCHLORIDE 2 MG/ML
4 INJECTION INTRAMUSCULAR; INTRAVENOUS; SUBCUTANEOUS
Refills: 0 | Status: DISCONTINUED | OUTPATIENT
Start: 2020-10-01 | End: 2020-10-03

## 2020-10-01 RX ORDER — CELECOXIB 200 MG/1
200 CAPSULE ORAL
Refills: 0 | Status: DISCONTINUED | OUTPATIENT
Start: 2020-10-03 | End: 2020-10-03

## 2020-10-01 RX ORDER — SODIUM CHLORIDE 9 MG/ML
1000 INJECTION, SOLUTION INTRAVENOUS
Refills: 0 | Status: DISCONTINUED | OUTPATIENT
Start: 2020-10-01 | End: 2020-10-02

## 2020-10-01 RX ORDER — HYDROMORPHONE HYDROCHLORIDE 2 MG/ML
0.5 INJECTION INTRAMUSCULAR; INTRAVENOUS; SUBCUTANEOUS
Refills: 0 | Status: DISCONTINUED | OUTPATIENT
Start: 2020-10-01 | End: 2020-10-03

## 2020-10-01 RX ORDER — PANTOPRAZOLE SODIUM 20 MG/1
40 TABLET, DELAYED RELEASE ORAL
Refills: 0 | Status: DISCONTINUED | OUTPATIENT
Start: 2020-10-01 | End: 2020-10-03

## 2020-10-01 RX ORDER — SODIUM CHLORIDE 9 MG/ML
1000 INJECTION, SOLUTION INTRAVENOUS
Refills: 0 | Status: DISCONTINUED | OUTPATIENT
Start: 2020-10-01 | End: 2020-10-01

## 2020-10-01 RX ORDER — SENNA PLUS 8.6 MG/1
2 TABLET ORAL AT BEDTIME
Refills: 0 | Status: DISCONTINUED | OUTPATIENT
Start: 2020-10-01 | End: 2020-10-03

## 2020-10-01 RX ORDER — KETOROLAC TROMETHAMINE 30 MG/ML
15 SYRINGE (ML) INJECTION EVERY 6 HOURS
Refills: 0 | Status: DISCONTINUED | OUTPATIENT
Start: 2020-10-01 | End: 2020-10-02

## 2020-10-01 RX ADMIN — Medication 975 MILLIGRAM(S): at 12:42

## 2020-10-01 RX ADMIN — CELECOXIB 400 MILLIGRAM(S): 200 CAPSULE ORAL at 12:42

## 2020-10-01 RX ADMIN — Medication 15 MILLIGRAM(S): at 18:00

## 2020-10-01 RX ADMIN — SODIUM CHLORIDE 1000 MILLILITER(S): 9 INJECTION, SOLUTION INTRAVENOUS at 20:32

## 2020-10-01 RX ADMIN — HYDROMORPHONE HYDROCHLORIDE 1 MILLIGRAM(S): 2 INJECTION INTRAMUSCULAR; INTRAVENOUS; SUBCUTANEOUS at 19:01

## 2020-10-01 RX ADMIN — SODIUM CHLORIDE 3 MILLILITER(S): 9 INJECTION INTRAMUSCULAR; INTRAVENOUS; SUBCUTANEOUS at 22:13

## 2020-10-01 RX ADMIN — Medication 100 MILLIGRAM(S): at 23:30

## 2020-10-01 RX ADMIN — HYDROMORPHONE HYDROCHLORIDE 1 MILLIGRAM(S): 2 INJECTION INTRAMUSCULAR; INTRAVENOUS; SUBCUTANEOUS at 18:36

## 2020-10-01 RX ADMIN — OXYCODONE HYDROCHLORIDE 10 MILLIGRAM(S): 5 TABLET ORAL at 20:39

## 2020-10-01 RX ADMIN — Medication 400 MILLIGRAM(S): at 20:33

## 2020-10-01 RX ADMIN — Medication 15 MILLIGRAM(S): at 23:30

## 2020-10-01 RX ADMIN — OXYCODONE HYDROCHLORIDE 10 MILLIGRAM(S): 5 TABLET ORAL at 18:45

## 2020-10-01 RX ADMIN — Medication 650 MILLIGRAM(S): at 23:30

## 2020-10-01 RX ADMIN — APREPITANT 40 MILLIGRAM(S): 80 CAPSULE ORAL at 12:42

## 2020-10-01 RX ADMIN — HYDROMORPHONE HYDROCHLORIDE 1 MILLIGRAM(S): 2 INJECTION INTRAMUSCULAR; INTRAVENOUS; SUBCUTANEOUS at 17:20

## 2020-10-01 RX ADMIN — HYDROMORPHONE HYDROCHLORIDE 1 MILLIGRAM(S): 2 INJECTION INTRAMUSCULAR; INTRAVENOUS; SUBCUTANEOUS at 17:30

## 2020-10-01 RX ADMIN — HYDROMORPHONE HYDROCHLORIDE 1 MILLIGRAM(S): 2 INJECTION INTRAMUSCULAR; INTRAVENOUS; SUBCUTANEOUS at 17:36

## 2020-10-01 RX ADMIN — HYDROMORPHONE HYDROCHLORIDE 1 MILLIGRAM(S): 2 INJECTION INTRAMUSCULAR; INTRAVENOUS; SUBCUTANEOUS at 19:35

## 2020-10-01 RX ADMIN — CELECOXIB 400 MILLIGRAM(S): 200 CAPSULE ORAL at 12:43

## 2020-10-01 RX ADMIN — Medication 15 MILLIGRAM(S): at 17:36

## 2020-10-01 RX ADMIN — HYDROMORPHONE HYDROCHLORIDE 1 MILLIGRAM(S): 2 INJECTION INTRAMUSCULAR; INTRAVENOUS; SUBCUTANEOUS at 18:06

## 2020-10-01 RX ADMIN — SODIUM CHLORIDE 75 MILLILITER(S): 9 INJECTION, SOLUTION INTRAVENOUS at 17:47

## 2020-10-01 RX ADMIN — HYDROMORPHONE HYDROCHLORIDE 1 MILLIGRAM(S): 2 INJECTION INTRAMUSCULAR; INTRAVENOUS; SUBCUTANEOUS at 17:46

## 2020-10-01 RX ADMIN — OXYCODONE HYDROCHLORIDE 10 MILLIGRAM(S): 5 TABLET ORAL at 17:50

## 2020-10-01 NOTE — DISCHARGE NOTE PROVIDER - NSDCFUSCHEDAPPT_GEN_ALL_CORE_FT
TRISHA LOVE ; 10/21/2020 ; [facility] [address] ; 11/24/2020 ; [facility] [address] ; 12/23/2020 ; [facility] [address]

## 2020-10-01 NOTE — DISCHARGE NOTE PROVIDER - NSDCCPCAREPLAN_GEN_ALL_CORE_FT
PRINCIPAL DISCHARGE DIAGNOSIS  Diagnosis: Unilateral primary osteoarthritis, left hip  Assessment and Plan of Treatment:

## 2020-10-01 NOTE — ASU PREOP CHECKLIST - ASSESSMENT, HISTORY & PHYSICAL COMPLETED AND ON MEDICAL RECORD
Discharge note:  Cervix rechecked approx two hours from last SVE and remains unchanged. Pt denies any worsening or more frequent contractions. Pt reports FM and denies LOF and VB. Ts cat I. Dr. Horne updated on pt status. VO received to discharge pt with labor precautions. EFM/TOCO removed and pt changed into regular clothes. Discharge instructions reviewed with pt and her  including continuing medications as prescribed, S/S of true labor, non-drug interventions for false labor contractions, when to call MD/come into hospital, and keep appointments as scheduled. Pt and her  state understanding of discharge instructions and are agreeable to discharge. Pt declined w/c to ER where her husbands vehicle is parked. Pt is being driven home by  via private car. Pt has all belongings and is in stable condition at time of discharge. Pt ambulated off unit w/out difficulty with her . See CPN, AVS, and flowsheets for more information.        done

## 2020-10-01 NOTE — PHYSICAL THERAPY INITIAL EVALUATION ADULT - PHYSICAL ASSIST/NONPHYSICAL ASSIST: GAIT, REHAB EVAL
verbal cues/pt required increased physical assistance to help maintain proper upright walking posture during ambulation, assistance re proper use of AD and assistance for safety & falls prevention; verbal cues re proper gait sequence + proper use of RW; verbal cues re reinforcement of posterior hip precautions/1 person assist

## 2020-10-01 NOTE — DISCHARGE NOTE PROVIDER - HOSPITAL COURSE
The patient underwent a LEFT POSTERIOR TOTAL HIP REPLACEMENT on 10/1. The patient received antibiotics consistent with SCIP guidelines. The patient underwent the procedure and had no intra-operative complications. Post-operatively, the patient was seen by medicine and PT. The patient received ASPIRIN for DVTP. The patient received pain medications per orthopedic pain management pathway and the pain was appropriately controlled. Patient was instructed on posterior total hip precautions by PT. The patient did not have any post-operative medical complications. The patient was discharged in stable condition.

## 2020-10-01 NOTE — DISCHARGE NOTE PROVIDER - CARE PROVIDER_API CALL
German Guerrero  ORTHOPAEDIC SURGERY  [address], [facility] [address]  [place] [address]  Phone: [phone]  Fax: [phone]  Follow Up Time:

## 2020-10-01 NOTE — PHYSICAL THERAPY INITIAL EVALUATION ADULT - ADDITIONAL COMMENTS
Pt lives with spouse and brother in a private home with 4 GIULIANA 1 handrail + approx 10 steps 1 handrail to bed&bath. Pt's PLOF was independent in all ADL's + ambulation without an Assistive Device and was driving PTA. Pt has shower chair, SAC and RW DME at home.

## 2020-10-01 NOTE — PHYSICAL THERAPY INITIAL EVALUATION ADULT - PHYSICAL ASSIST/NONPHYSICAL ASSIST: SIT/SUPINE, REHAB EVAL
1 person assist/pt required increased physical assistance to help get bilateral LE's into bed and to help assume proper semi-art position in bed; verbal cues for proper sequence + proper use of bed rails; verbal cues re reinforcement of posterior hip precautions/verbal cues

## 2020-10-01 NOTE — PROGRESS NOTE ADULT - SUBJECTIVE AND OBJECTIVE BOX
History: Patient is status post LEFT posterior total hip arthroplasty on 10/1, POD # 0.   Patient is doing well and is comfortable.   The patient's pain is controlled using the prescribed pain medications.   The patient is participating in physical therapy.   Denies nausea, vomiting, chest pain, shortness of breath, abdominal pain or fever. No new complaints.    As per RN, patient difficult to arouse but was a&o x 3. Drowsy for a brief period, likely secondary to narcotics.  VSS.     Vital Signs Last 24 Hrs  T(C): 36.3 (01 Oct 2020 22:25), Max: 36.7 (01 Oct 2020 12:27)  T(F): 97.4 (01 Oct 2020 22:25), Max: 98 (01 Oct 2020 12:27)  HR: 81 (01 Oct 2020 22:25) (59 - 84)  BP: 123/70 (01 Oct 2020 22:25) (102/73 - 139/67)  BP(mean): 92 (01 Oct 2020 20:35) (92 - 92)  RR: 18 (01 Oct 2020 22:30) (12 - 20)  SpO2: 94% (01 Oct 2020 22:30) (89% - 99%)    Physical exam:   The left hip dressing is clean, dry and intact. No drainage or discharge.   The calf is supple nontender. Passive range of motion is acceptable to due postoperative pain. No calf tenderness.   Sensation to light touch is grossly intact distally.   Motor function distally is 5/5. No foot drop.   2+ dorsalis pedis pulse. Capillary refill is less than 2 seconds. No cyanosis.    Primary Orthopedic Assessment:  • s/p LEFT Posterior total hip replacement    Plan:   • DVT prophylaxis as prescribed, including use of compression devices and ankle pumps  • Continue physical therapy  • Weightbearing as tolerated of the LEFT lower extremity with assistance of a walker  • Incentive spirometry encouraged  • Pain control as clinically indicated  • Posterior hip precautions reviewed with patient  • ANCEF per scip  •

## 2020-10-01 NOTE — PHYSICAL THERAPY INITIAL EVALUATION ADULT - DISCHARGE DISPOSITION, PT EVAL
home with assist, RW and home PT pending progress and pending stair assessment as pt is an increased falls risk with multiple stairs to negotiate at this time, deeming pt unsafe to return home at this time.

## 2020-10-01 NOTE — PROGRESS NOTE ADULT - SUBJECTIVE AND OBJECTIVE BOX
Pelvis & hip films reviewed. Implants are in appropriate position. No fracture or dislocation noted. Patient is WBAT of the surgical extremity.

## 2020-10-01 NOTE — PHYSICAL THERAPY INITIAL EVALUATION ADULT - TRANSFER SAFETY CONCERNS NOTED: SIT/STAND, REHAB EVAL
decreased safety awareness/decreased balance during turns/decreased step length/stepping too close to front of assistive device/decreased weight-shifting ability

## 2020-10-01 NOTE — DISCHARGE NOTE PROVIDER - NSDCFUADDINST_GEN_ALL_CORE_FT
The patient will be seen in the office between 2-3 weeks for wound check.   **Your first post-operative visit has been scheduled prior to your admission. PLEASE CONTACT OFFICE TO CONFIRM THE APPOINTMENT DATE. Tape will be removed at that time.  **  The silver based dressing is to be removed 7 days from the date of surgery (10/8).   ** CONTACT THE OFFICE IF THE FOLLOWING DEVELOP:  - the dressing becomes soiled or saturated  - you develop a fever greater that 101F  - the wound becomes red or you develop blistering around the wound  * Patient may shower after post-op day #3 (10/4).   * The patient will continue home PT consistent with posterior total hip replacement protocol and will continue to practice posterior total hip precautions for a minimum of 6 week. Transition to outpatient PT will occur at the time of the first office visit.   * The patient is FULL weight bearing.  * The patient will continue ASPIRIN for 6 weeks after surgery for blood clot prevention.  * While on aspirin, the patient will take daily omeprazole or other similar medication to protect the stomach from irritation.   * The patient will take OXYCODONE AND TYLENOL for pain control and adjust according to prescription and patient needs. Contact the office if pain increases while taking prescribed pain medications or related concerns develop.  * Celebrex will be taken twice daily for 3 weeks for pain control and prevention of excessive bone growth. Additional prescription may be requested at your office follow-up visit.  * The patient will take Senna S while taking oxycodone to prevent narcotic associated constipation.  Additionally, increase water intake (drink at least 8 glasses of water daily) and try adding fiber to the diet by eating fruits, vegetables and foods that are rich in grains. If constipation is experienced, contact the medical/primary care provider to discuss further treatment options.  * To avoid injury at home:  - continue use of rolling walker until cleared by physical therapist  - have family or friend remove all throw rug or objects in hallways that may present a trip hazard.  - if you experience any dizziness or medical concerns, call your medical doctor or 911.  * The implant may activate metal detection devices.

## 2020-10-01 NOTE — PHYSICAL THERAPY INITIAL EVALUATION ADULT - GENERAL OBSERVATIONS, REHAB EVAL
Glasses/Normal vision: sees adequately in most situations; can see medication labels, newsprint Pt received in PACU, JAYE jerome'ed pt for PT. xray read + confirmed implant in proper alignment and no fxs/dislocations. pt observed semi-art in bed with abduction pillow in place, IV, telemonitor with , BP cuff, VCBs, pleasant, cooperative, A&O and c/o 9.5/10 left knee and hip t/o eval

## 2020-10-01 NOTE — PHYSICAL THERAPY INITIAL EVALUATION ADULT - PHYSICAL ASSIST/NONPHYSICAL ASSIST: SUPINE/SIT, REHAB EVAL
1 person assist/verbal cues/pt required increased physical assistance to get bilateral LE's out of bed and to help assume proper upright sitting at EOB posture; verbal cues for proper sequence + proper use of bed rails; verbal cues re reinforcement of posterior hip precautions

## 2020-10-01 NOTE — DISCHARGE NOTE PROVIDER - NSDCMRMEDTOKEN_GEN_ALL_CORE_FT
Motrin 800 mg oral tablet: 1 tab(s) orally 3 times a day, As Needed  SEROquel 25 mg oral tablet: 4 tab(s) orally once a day (at bedtime)  simvastatin 40 mg oral tablet: 1 tab(s) orally once a day (at bedtime)  Xanax: 5mg, sometimes  1mg max dose 2mg    acetaminophen 325 mg oral tablet: 2 tab(s) orally every 6 hours  aspirin 325 mg oral delayed release tablet: 1 tab(s) orally 2 times a day  celecoxib 200 mg oral capsule: 1 cap(s) orally 2 times a day  oxyCODONE 10 mg oral tablet: 1 tab(s) orally every 4 to 6 hours, As Needed  Pain MDD:6 pt may halve tablets   pantoprazole 40 mg oral delayed release tablet: 1 tab(s) orally once a day (before a meal)  Senna S 50 mg-8.6 mg oral tablet: 2 tab(s) orally once a day (at bedtime)   SEROquel 25 mg oral tablet: 4 tab(s) orally once a day (at bedtime)  simvastatin 40 mg oral tablet: 1 tab(s) orally once a day (at bedtime)  Xanax: 5mg, sometimes  1mg max dose 2mg

## 2020-10-01 NOTE — PHYSICAL THERAPY INITIAL EVALUATION ADULT - PHYSICAL ASSIST/NONPHYSICAL ASSIST: SIT/STAND, REHAB EVAL
verbal cues/1 person assist/pt required increased physical assistance to help perform transfer/to rise to a full standing position. verbal cues re proper sequence + proper hand placement in prep to perform transfer; verbal cues re reinforcement of posterior hip precautions

## 2020-10-02 ENCOUNTER — TRANSCRIPTION ENCOUNTER (OUTPATIENT)
Age: 65
End: 2020-10-02

## 2020-10-02 DIAGNOSIS — G45.9 TRANSIENT CEREBRAL ISCHEMIC ATTACK, UNSPECIFIED: ICD-10-CM

## 2020-10-02 DIAGNOSIS — E87.5 HYPERKALEMIA: ICD-10-CM

## 2020-10-02 DIAGNOSIS — D72.829 ELEVATED WHITE BLOOD CELL COUNT, UNSPECIFIED: ICD-10-CM

## 2020-10-02 DIAGNOSIS — F41.9 ANXIETY DISORDER, UNSPECIFIED: ICD-10-CM

## 2020-10-02 DIAGNOSIS — E78.5 HYPERLIPIDEMIA, UNSPECIFIED: ICD-10-CM

## 2020-10-02 LAB
ANION GAP SERPL CALC-SCNC: 10 MMOL/L — SIGNIFICANT CHANGE UP (ref 5–17)
BUN SERPL-MCNC: 10 MG/DL — SIGNIFICANT CHANGE UP (ref 8–20)
CALCIUM SERPL-MCNC: 9 MG/DL — SIGNIFICANT CHANGE UP (ref 8.6–10.2)
CHLORIDE SERPL-SCNC: 104 MMOL/L — SIGNIFICANT CHANGE UP (ref 98–107)
CO2 SERPL-SCNC: 25 MMOL/L — SIGNIFICANT CHANGE UP (ref 22–29)
CREAT SERPL-MCNC: 0.7 MG/DL — SIGNIFICANT CHANGE UP (ref 0.5–1.3)
GLUCOSE SERPL-MCNC: 154 MG/DL — HIGH (ref 70–99)
HCT VFR BLD CALC: 36.8 % — SIGNIFICANT CHANGE UP (ref 34.5–45)
HGB BLD-MCNC: 11.9 G/DL — SIGNIFICANT CHANGE UP (ref 11.5–15.5)
MCHC RBC-ENTMCNC: 31.1 PG — SIGNIFICANT CHANGE UP (ref 27–34)
MCHC RBC-ENTMCNC: 32.3 GM/DL — SIGNIFICANT CHANGE UP (ref 32–36)
MCV RBC AUTO: 96.1 FL — SIGNIFICANT CHANGE UP (ref 80–100)
PLATELET # BLD AUTO: 157 K/UL — SIGNIFICANT CHANGE UP (ref 150–400)
POTASSIUM SERPL-MCNC: 5.2 MMOL/L — SIGNIFICANT CHANGE UP (ref 3.5–5.3)
POTASSIUM SERPL-SCNC: 5.2 MMOL/L — SIGNIFICANT CHANGE UP (ref 3.5–5.3)
RBC # BLD: 3.83 M/UL — SIGNIFICANT CHANGE UP (ref 3.8–5.2)
RBC # FLD: 12.7 % — SIGNIFICANT CHANGE UP (ref 10.3–14.5)
SODIUM SERPL-SCNC: 139 MMOL/L — SIGNIFICANT CHANGE UP (ref 135–145)
WBC # BLD: 10.72 K/UL — HIGH (ref 3.8–10.5)
WBC # FLD AUTO: 10.72 K/UL — HIGH (ref 3.8–10.5)

## 2020-10-02 PROCEDURE — 99222 1ST HOSP IP/OBS MODERATE 55: CPT

## 2020-10-02 RX ORDER — PANTOPRAZOLE SODIUM 20 MG/1
1 TABLET, DELAYED RELEASE ORAL
Qty: 45 | Refills: 0
Start: 2020-10-02 | End: 2020-11-15

## 2020-10-02 RX ORDER — IBUPROFEN 200 MG
1 TABLET ORAL
Qty: 0 | Refills: 0 | DISCHARGE

## 2020-10-02 RX ORDER — NICOTINE POLACRILEX 2 MG
1 GUM BUCCAL DAILY
Refills: 0 | Status: DISCONTINUED | OUTPATIENT
Start: 2020-10-02 | End: 2020-10-03

## 2020-10-02 RX ORDER — SENNOSIDES/DOCUSATE SODIUM 8.6MG-50MG
2 TABLET ORAL
Qty: 14 | Refills: 0
Start: 2020-10-02 | End: 2020-10-08

## 2020-10-02 RX ORDER — OXYCODONE HYDROCHLORIDE 5 MG/1
1 TABLET ORAL
Qty: 42 | Refills: 0
Start: 2020-10-02 | End: 2020-10-08

## 2020-10-02 RX ORDER — ASPIRIN/CALCIUM CARB/MAGNESIUM 324 MG
1 TABLET ORAL
Qty: 90 | Refills: 0
Start: 2020-10-02 | End: 2020-11-15

## 2020-10-02 RX ORDER — ACETAMINOPHEN 500 MG
2 TABLET ORAL
Qty: 0 | Refills: 0 | DISCHARGE
Start: 2020-10-02

## 2020-10-02 RX ORDER — CELECOXIB 200 MG/1
1 CAPSULE ORAL
Qty: 42 | Refills: 0
Start: 2020-10-02 | End: 2020-10-22

## 2020-10-02 RX ADMIN — Medication 650 MILLIGRAM(S): at 12:25

## 2020-10-02 RX ADMIN — Medication 15 MILLIGRAM(S): at 00:00

## 2020-10-02 RX ADMIN — Medication 650 MILLIGRAM(S): at 11:29

## 2020-10-02 RX ADMIN — OXYCODONE HYDROCHLORIDE 10 MILLIGRAM(S): 5 TABLET ORAL at 03:30

## 2020-10-02 RX ADMIN — Medication 15 MILLIGRAM(S): at 07:06

## 2020-10-02 RX ADMIN — POLYETHYLENE GLYCOL 3350 17 GRAM(S): 17 POWDER, FOR SOLUTION ORAL at 11:29

## 2020-10-02 RX ADMIN — OXYCODONE HYDROCHLORIDE 10 MILLIGRAM(S): 5 TABLET ORAL at 18:35

## 2020-10-02 RX ADMIN — SODIUM CHLORIDE 3 MILLILITER(S): 9 INJECTION INTRAMUSCULAR; INTRAVENOUS; SUBCUTANEOUS at 06:36

## 2020-10-02 RX ADMIN — OXYCODONE HYDROCHLORIDE 10 MILLIGRAM(S): 5 TABLET ORAL at 08:17

## 2020-10-02 RX ADMIN — OXYCODONE HYDROCHLORIDE 10 MILLIGRAM(S): 5 TABLET ORAL at 02:51

## 2020-10-02 RX ADMIN — Medication 650 MILLIGRAM(S): at 18:35

## 2020-10-02 RX ADMIN — PANTOPRAZOLE SODIUM 40 MILLIGRAM(S): 20 TABLET, DELAYED RELEASE ORAL at 06:36

## 2020-10-02 RX ADMIN — OXYCODONE HYDROCHLORIDE 10 MILLIGRAM(S): 5 TABLET ORAL at 17:35

## 2020-10-02 RX ADMIN — Medication 325 MILLIGRAM(S): at 17:36

## 2020-10-02 RX ADMIN — Medication 15 MILLIGRAM(S): at 06:36

## 2020-10-02 RX ADMIN — Medication 650 MILLIGRAM(S): at 07:06

## 2020-10-02 RX ADMIN — QUETIAPINE FUMARATE 100 MILLIGRAM(S): 200 TABLET, FILM COATED ORAL at 21:09

## 2020-10-02 RX ADMIN — Medication 15 MILLIGRAM(S): at 11:29

## 2020-10-02 RX ADMIN — Medication 1 MILLIGRAM(S): at 11:49

## 2020-10-02 RX ADMIN — Medication 650 MILLIGRAM(S): at 17:36

## 2020-10-02 RX ADMIN — OXYCODONE HYDROCHLORIDE 10 MILLIGRAM(S): 5 TABLET ORAL at 21:10

## 2020-10-02 RX ADMIN — Medication 100 MILLIGRAM(S): at 06:36

## 2020-10-02 RX ADMIN — Medication 1 PATCH: at 12:16

## 2020-10-02 RX ADMIN — OXYCODONE HYDROCHLORIDE 10 MILLIGRAM(S): 5 TABLET ORAL at 09:15

## 2020-10-02 RX ADMIN — SIMVASTATIN 40 MILLIGRAM(S): 20 TABLET, FILM COATED ORAL at 21:10

## 2020-10-02 RX ADMIN — Medication 650 MILLIGRAM(S): at 00:00

## 2020-10-02 RX ADMIN — SENNA PLUS 2 TABLET(S): 8.6 TABLET ORAL at 21:09

## 2020-10-02 RX ADMIN — Medication 650 MILLIGRAM(S): at 06:36

## 2020-10-02 RX ADMIN — Medication 15 MILLIGRAM(S): at 12:00

## 2020-10-02 RX ADMIN — SODIUM CHLORIDE 3 MILLILITER(S): 9 INJECTION INTRAMUSCULAR; INTRAVENOUS; SUBCUTANEOUS at 13:10

## 2020-10-02 RX ADMIN — OXYCODONE HYDROCHLORIDE 10 MILLIGRAM(S): 5 TABLET ORAL at 22:40

## 2020-10-02 RX ADMIN — Medication 325 MILLIGRAM(S): at 06:36

## 2020-10-02 RX ADMIN — SODIUM CHLORIDE 3 MILLILITER(S): 9 INJECTION INTRAMUSCULAR; INTRAVENOUS; SUBCUTANEOUS at 21:12

## 2020-10-02 NOTE — OCCUPATIONAL THERAPY INITIAL EVALUATION ADULT - GENERAL OBSERVATIONS, REHAB EVAL
pr received lying in bed and left as found, +abd wedge, c/o pain right LE 5/10 and c/o constipation, rn aware

## 2020-10-02 NOTE — CONSULT NOTE ADULT - PROBLEM SELECTOR RECOMMENDATION 9
S/p Left RITIKA POD#1  Pain control.  PT/OT.  Antibiotics, wound care and DVT prophylaxis as per Ortho.  Incentive spirometry.  Avoid opioid induced constipation.
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
## 2020-10-02 NOTE — DISCHARGE NOTE NURSING/CASE MANAGEMENT/SOCIAL WORK - PATIENT PORTAL LINK FT
You can access the FollowMyHealth Patient Portal offered by [facility] by registering at the following website: [email or web address]. By joining [facility]’s FollowMyHealth portal, you will also be able to view your health information using other applications (apps) compatible with our system.

## 2020-10-02 NOTE — CONSULT NOTE ADULT - PROBLEM SELECTOR RECOMMENDATION 6
K on higher side of normal.  Will discontinue IVF. Patient encouraged to increase PO fluids.   Monitor BMP.
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
## 2020-10-02 NOTE — OCCUPATIONAL THERAPY INITIAL EVALUATION ADULT - LOWER BODY DRESSING, ASSISTIVE DEVICE, OT EVAL
dressing stick/needs further adaptive equipment training, pt will purchase hip kit privately/reacher/sock-aid/elastic laces/long-handled shoe horn

## 2020-10-02 NOTE — CONSULT NOTE ADULT - ATTENDING COMMENTS
Patient seen and examined with POLINA Leon  s/p ISAI RITIKA , POD # 1 , pain well controlled , no other complaints ,   Vitals stable , labs reviewed , Acute blood lost anemia - secondary to surgical blood lost - noted , asymptomatic.   Above noted / reviewed , agree with above .   Continue current mgmt , PT/OT .   Dispo plan is Home , likely in am .    Medically stable to d/c once cleared by physical therapy / ortho .   d/w patient / NP / ortho PA .

## 2020-10-02 NOTE — CONSULT NOTE ADULT - PROBLEM SELECTOR RECOMMENDATION 2
Patient reports she takes ASA intermittently.  Follow up with primary doctor to discuss daily 81mg ASA after completing DVT prophylaxis dosing.  Continue statin.

## 2020-10-02 NOTE — CONSULT NOTE ADULT - ASSESSMENT
64 y/o female with anxiety, OA, Chronic back and neck pain, HLD, TIA( About nine years ago) presents with progressively worsening left hip pain [id number], with associated difficulty ambulating and fall, mild relief with Ibuprofen, s/p cortisone injections with limited relief, now s/p Left RITIKA POD #1.

## 2020-10-03 VITALS
OXYGEN SATURATION: 94 % | HEART RATE: 84 BPM | RESPIRATION RATE: 16 BRPM | DIASTOLIC BLOOD PRESSURE: 89 MMHG | TEMPERATURE: 98 F | SYSTOLIC BLOOD PRESSURE: 143 MMHG

## 2020-10-03 LAB
ANION GAP SERPL CALC-SCNC: 9 MMOL/L — SIGNIFICANT CHANGE UP (ref 5–17)
BUN SERPL-MCNC: 16 MG/DL — SIGNIFICANT CHANGE UP (ref 8–20)
CALCIUM SERPL-MCNC: 8.8 MG/DL — SIGNIFICANT CHANGE UP (ref 8.6–10.2)
CHLORIDE SERPL-SCNC: 105 MMOL/L — SIGNIFICANT CHANGE UP (ref 98–107)
CO2 SERPL-SCNC: 28 MMOL/L — SIGNIFICANT CHANGE UP (ref 22–29)
CREAT SERPL-MCNC: 0.74 MG/DL — SIGNIFICANT CHANGE UP (ref 0.5–1.3)
GLUCOSE SERPL-MCNC: 98 MG/DL — SIGNIFICANT CHANGE UP (ref 70–99)
HCT VFR BLD CALC: 34.5 % — SIGNIFICANT CHANGE UP (ref 34.5–45)
HGB BLD-MCNC: 11.3 G/DL — LOW (ref 11.5–15.5)
MCHC RBC-ENTMCNC: 31.6 PG — SIGNIFICANT CHANGE UP (ref 27–34)
MCHC RBC-ENTMCNC: 32.8 GM/DL — SIGNIFICANT CHANGE UP (ref 32–36)
MCV RBC AUTO: 96.4 FL — SIGNIFICANT CHANGE UP (ref 80–100)
PLATELET # BLD AUTO: 131 K/UL — LOW (ref 150–400)
POTASSIUM SERPL-MCNC: 4.7 MMOL/L — SIGNIFICANT CHANGE UP (ref 3.5–5.3)
POTASSIUM SERPL-SCNC: 4.7 MMOL/L — SIGNIFICANT CHANGE UP (ref 3.5–5.3)
RBC # BLD: 3.58 M/UL — LOW (ref 3.8–5.2)
RBC # FLD: 13.1 % — SIGNIFICANT CHANGE UP (ref 10.3–14.5)
SODIUM SERPL-SCNC: 142 MMOL/L — SIGNIFICANT CHANGE UP (ref 135–145)
WBC # BLD: 6.51 K/UL — SIGNIFICANT CHANGE UP (ref 3.8–10.5)
WBC # FLD AUTO: 6.51 K/UL — SIGNIFICANT CHANGE UP (ref 3.8–10.5)

## 2020-10-03 PROCEDURE — 99232 SBSQ HOSP IP/OBS MODERATE 35: CPT

## 2020-10-03 RX ADMIN — OXYCODONE HYDROCHLORIDE 10 MILLIGRAM(S): 5 TABLET ORAL at 10:12

## 2020-10-03 RX ADMIN — Medication 325 MILLIGRAM(S): at 05:38

## 2020-10-03 RX ADMIN — Medication 650 MILLIGRAM(S): at 06:47

## 2020-10-03 RX ADMIN — SODIUM CHLORIDE 3 MILLILITER(S): 9 INJECTION INTRAMUSCULAR; INTRAVENOUS; SUBCUTANEOUS at 06:47

## 2020-10-03 RX ADMIN — Medication 1 PATCH: at 11:18

## 2020-10-03 RX ADMIN — CELECOXIB 200 MILLIGRAM(S): 200 CAPSULE ORAL at 06:47

## 2020-10-03 RX ADMIN — Medication 1 PATCH: at 11:46

## 2020-10-03 RX ADMIN — PANTOPRAZOLE SODIUM 40 MILLIGRAM(S): 20 TABLET, DELAYED RELEASE ORAL at 05:38

## 2020-10-03 RX ADMIN — SODIUM CHLORIDE 3 MILLILITER(S): 9 INJECTION INTRAMUSCULAR; INTRAVENOUS; SUBCUTANEOUS at 13:30

## 2020-10-03 RX ADMIN — OXYCODONE HYDROCHLORIDE 10 MILLIGRAM(S): 5 TABLET ORAL at 09:12

## 2020-10-03 RX ADMIN — CELECOXIB 200 MILLIGRAM(S): 200 CAPSULE ORAL at 05:37

## 2020-10-03 RX ADMIN — Medication 650 MILLIGRAM(S): at 11:18

## 2020-10-03 RX ADMIN — Medication 650 MILLIGRAM(S): at 12:15

## 2020-10-03 RX ADMIN — Medication 650 MILLIGRAM(S): at 05:38

## 2020-10-03 NOTE — PROGRESS NOTE ADULT - SUBJECTIVE AND OBJECTIVE BOX
TRISHA LOVE    [id number]    Patient seen and examined status post left posterior total hip arthroplasty, POD # 2. Patient is doing well. The patient's pain is controlled using the prescribed pain medications. The patient is participating in physical therapy. Denies nausea, vomiting, chest pain, shortness of breath, abdominal pain, LE N/T. No new complaints.    Vital Signs Last 24 Hrs  T(C): 36.6 (03 Oct 2020 07:53), Max: 36.8 (02 Oct 2020 20:15)  T(F): 97.8 (03 Oct 2020 07:53), Max: 98.3 (02 Oct 2020 20:15)  HR: 68 (03 Oct 2020 07:53) (68 - 78)  BP: 128/83 (03 Oct 2020 07:53) (126/84 - 145/91)  BP(mean): --  RR: 18 (03 Oct 2020 07:53) (18 - 18)  SpO2: 96% (03 Oct 2020 07:53) (93% - 96%)                          11.3   6.51  )-----------( 131      ( 03 Oct 2020 07:00 )             34.5     10-03    142  |  105  |  16.0  ----------------------------<  98  4.7   |  28.0  |  0.74    Ca    8.8      03 Oct 2020 07:01        MEDICATIONS  (STANDING):  acetaminophen   Tablet .. 650 milliGRAM(s) Oral every 6 hours  aspirin enteric coated 325 milliGRAM(s) Oral two times a day  celecoxib 200 milliGRAM(s) Oral two times a day  nicotine - 21 mG/24Hr(s) Patch 1 patch Transdermal daily  pantoprazole    Tablet 40 milliGRAM(s) Oral before breakfast  polyethylene glycol 3350 17 Gram(s) Oral daily  QUEtiapine 100 milliGRAM(s) Oral at bedtime  senna 2 Tablet(s) Oral at bedtime  simvastatin 40 milliGRAM(s) Oral at bedtime  sodium chloride 0.9% lock flush 3 milliLiter(s) IV Push every 8 hours    MEDICATIONS  (PRN):  ALPRAZolam 1 milliGRAM(s) Oral daily PRN anxiety  aluminum hydroxide/magnesium hydroxide/simethicone Suspension 30 milliLiter(s) Oral four times a day PRN Indigestion  bisacodyl Suppository 10 milliGRAM(s) Rectal daily PRN If no bowel movement by POD#2  HYDROmorphone   Tablet 4 milliGRAM(s) Oral every 3 hours PRN Severe Pain (7 - 10)  HYDROmorphone  Injectable 0.5 milliGRAM(s) IV Push every 3 hours PRN breakthrough pain  magnesium hydroxide Suspension 30 milliLiter(s) Oral daily PRN Constipation  ondansetron Injectable 4 milliGRAM(s) IV Push every 6 hours PRN Nausea and/or Vomiting  oxyCODONE    IR 5 milliGRAM(s) Oral every 3 hours PRN Mild Pain (1 - 3)  oxyCODONE    IR 10 milliGRAM(s) Oral every 3 hours PRN Moderate Pain (4 - 6)      Physical exam: The left hip dressing is clean, dry and intact. No drainage or discharge. No erythema is noted. No blistering. No ecchymosis. The calf is supple nontender. Passive range of motion is acceptable to due postoperative pain. No calf tenderness. Sensation to light touch is grossly intact distally. Motor function distally is 5/5. No foot drop. 2+ dorsalis pedis pulse. Capillary refill is less than 2 seconds. No cyanosis.    Primary Orthopedic Assessment:  • s/p LEFT POSTERIOR total hip replacement, POD # 2. Doing well    Secondary  Orthopedic Assessment(s):   •     Secondary  Medical Assessment(s):   •     Plan:   • DVT prophylaxis as prescribed, including use of compression devices and ankle pumps  • Continue physical therapy  • Weightbearing as tolerated of the left lower extremity with assistance of a walker  • Incentive spirometry encouraged  • Pain control as clinically indicated  • Posterior hip precautions reviewed with patient  • Discharge planning – anticipated discharge is Home today

## 2020-10-03 NOTE — PROGRESS NOTE ADULT - SUBJECTIVE AND OBJECTIVE BOX
Patient seen and examined . S/p L RITIKA  , POD # 2. Pain well controlled , no BM x 5 days , no discomfort , participating with physical therapy .       CC : L hip pain well condrolled , constipated , no BM X 5 DAYS       MEDICATIONS  (STANDING):  acetaminophen   Tablet .. 650 milliGRAM(s) Oral every 6 hours  aspirin enteric coated 325 milliGRAM(s) Oral two times a day  celecoxib 200 milliGRAM(s) Oral two times a day  nicotine - 21 mG/24Hr(s) Patch 1 patch Transdermal daily  pantoprazole    Tablet 40 milliGRAM(s) Oral before breakfast  polyethylene glycol 3350 17 Gram(s) Oral daily  QUEtiapine 100 milliGRAM(s) Oral at bedtime  senna 2 Tablet(s) Oral at bedtime  simvastatin 40 milliGRAM(s) Oral at bedtime  sodium chloride 0.9% lock flush 3 milliLiter(s) IV Push every 8 hours    MEDICATIONS  (PRN):  ALPRAZolam 1 milliGRAM(s) Oral daily PRN anxiety  aluminum hydroxide/magnesium hydroxide/simethicone Suspension 30 milliLiter(s) Oral four times a day PRN Indigestion  bisacodyl Suppository 10 milliGRAM(s) Rectal daily PRN If no bowel movement by POD#2  HYDROmorphone   Tablet 4 milliGRAM(s) Oral every 3 hours PRN Severe Pain (7 - 10)  HYDROmorphone  Injectable 0.5 milliGRAM(s) IV Push every 3 hours PRN breakthrough pain  magnesium hydroxide Suspension 30 milliLiter(s) Oral daily PRN Constipation  ondansetron Injectable 4 milliGRAM(s) IV Push every 6 hours PRN Nausea and/or Vomiting  oxyCODONE    IR 5 milliGRAM(s) Oral every 3 hours PRN Mild Pain (1 - 3)  oxyCODONE    IR 10 milliGRAM(s) Oral every 3 hours PRN Moderate Pain (4 - 6)      LABS:                          11.3   6.51  )-----------( 131      ( 03 Oct 2020 07:00 )             34.5     10-03    142  |  105  |  16.0  ----------------------------<  98  4.7   |  28.0  |  0.74    Ca    8.8      03 Oct 2020 07:01      PTT - ( 01 Oct 2020 12:38 )  PTT:49.0 sec    REVIEW OF SYSTEMS:  As above , all other systems are reviewed and are negative .     Vital Signs Last 24 Hrs  T(C): 36.6 (03 Oct 2020 07:53), Max: 36.8 (02 Oct 2020 20:15)  T(F): 97.8 (03 Oct 2020 07:53), Max: 98.3 (02 Oct 2020 20:15)  HR: 68 (03 Oct 2020 07:53) (68 - 78)  BP: 128/83 (03 Oct 2020 07:53) (126/84 - 145/91)  BP(mean): --  RR: 18 (03 Oct 2020 07:53) (18 - 18)  SpO2: 96% (03 Oct 2020 07:53) (93% - 96%)    PHYSICAL EXAM:    GENERAL: NAD, well-groomed, well-developed  HEAD:  Atraumatic, Normocephalic  EYES: EOMI, PERRLA, conjunctiva and sclera clear  NECK: Supple, No JVD, Normal thyroid  NERVOUS SYSTEM:  Alert & Oriented X3, no focal deficit  CHEST/LUNG: CTA b/l ,  no  rales, rhonchi, wheezing, or rubs  HEART: Regular rate and rhythm; No murmurs, rubs, or gallops  ABDOMEN: Soft, Nontender, Nondistended; Bowel sounds present  EXTREMITIES:  2+ Peripheral Pulses, No clubbing, cyanosis, or edema, L hip dressing + , clean and dry   LYMPH: No lymphadenopathy noted  SKIN: No rashes or lesions

## 2020-10-03 NOTE — PROGRESS NOTE ADULT - ASSESSMENT
66 y/o female with anxiety, OA, Chronic back and neck pain, HLD, TIA( About nine years ago) presents with progressively worsening left hip pain [id number], with associated difficulty ambulating and fall, mild relief with Ibuprofen, s/p cortisone injections with limited relief, now s/p Left RITIKA POD #1.       Problem/Recommendation - 1:  Problem: Unilateral primary osteoarthritis, left hip. Recommendation: S/p Left RITIKA POD#1  Pain control.  PT/OT.  Antibiotics, wound care and DVT prophylaxis as per Ortho.  Incentive spirometry.  Avoid opioid induced constipation.     Problem/Recommendation - 2:  ·  Problem: TIA (transient ischemic attack).  Recommendation: Patient reports she takes ASA intermittently.  Follow up with primary doctor to discuss daily 81mg ASA after completing DVT prophylaxis dosing.  Continue statin.      Problem/Recommendation - 3:  ·  Problem: Dyslipidemia.  Recommendation: Continue statin.      Problem/Recommendation - 4:  ·  Problem: Anxiety.  Recommendation: Continue home medications.      Problem/Recommendation - 5:  ·  Problem: Leukocytosis, unspecified type.  Recommendation: Likely reactive.  Afebrile, no symptoms of infection.  Monitor CBC.      Problem/Recommendation - 6:  Problem: Hyperkalemia , resolved      Problem/Recommendation - 7:  Problem: Need for prophylactic measure. Recommendation: ASA BID as per Ortho.    Problem / Plan - 8: Acute blood lost anemia - secondary to surgical blood lost -  asymptomatic.     Problem / Plan - 9: Constipated , no BM X 5 days , states has Hx of IBS with constipations - continue Miralax / Senna ,   refusing to get MOM on day of discharge , advised to take prune juice at home , continue Miralax / Senna , increase PO fluid intake .    Medically stable to d/c once cleared by physical therapy / ortho .

## 2020-10-21 ENCOUNTER — APPOINTMENT (OUTPATIENT)
Dept: ORTHOPEDIC SURGERY | Facility: CLINIC | Age: 65
End: 2020-10-21
Payer: MEDICARE

## 2020-10-21 VITALS
WEIGHT: 150 LBS | DIASTOLIC BLOOD PRESSURE: 78 MMHG | HEIGHT: 62 IN | HEART RATE: 78 BPM | SYSTOLIC BLOOD PRESSURE: 115 MMHG | BODY MASS INDEX: 27.6 KG/M2

## 2020-10-21 PROCEDURE — 85730 THROMBOPLASTIN TIME PARTIAL: CPT

## 2020-10-21 PROCEDURE — 97116 GAIT TRAINING THERAPY: CPT

## 2020-10-21 PROCEDURE — 85027 COMPLETE CBC AUTOMATED: CPT

## 2020-10-21 PROCEDURE — 73502 X-RAY EXAM HIP UNI 2-3 VIEWS: CPT | Mod: LT

## 2020-10-21 PROCEDURE — 97166 OT EVAL MOD COMPLEX 45 MIN: CPT

## 2020-10-21 PROCEDURE — C1776: CPT

## 2020-10-21 PROCEDURE — C1713: CPT

## 2020-10-21 PROCEDURE — 36415 COLL VENOUS BLD VENIPUNCTURE: CPT

## 2020-10-21 PROCEDURE — 97110 THERAPEUTIC EXERCISES: CPT

## 2020-10-21 PROCEDURE — 80048 BASIC METABOLIC PNL TOTAL CA: CPT

## 2020-10-21 PROCEDURE — 82962 GLUCOSE BLOOD TEST: CPT

## 2020-10-21 PROCEDURE — 73501 X-RAY EXAM HIP UNI 1 VIEW: CPT

## 2020-10-21 PROCEDURE — 99072 ADDL SUPL MATRL&STAF TM PHE: CPT

## 2020-10-21 PROCEDURE — 99024 POSTOP FOLLOW-UP VISIT: CPT

## 2020-10-21 RX ORDER — PANTOPRAZOLE 40 MG/1
40 TABLET, DELAYED RELEASE ORAL TWICE DAILY
Qty: 60 | Refills: 0 | Status: ACTIVE | COMMUNITY
Start: 2020-10-21 | End: 1900-01-01

## 2020-10-21 NOTE — HISTORY OF PRESENT ILLNESS
[Procedure: ___] : status post [unfilled] [Clean/Dry/Intact] : clean, dry and intact [Healed] : healed [Neuro Intact] : an unremarkable neurological exam [Vascular Intact] : ~T peripheral vascular exam normal [Negative Nishant's] : maneuvers demonstrated a negative Nishant's sign [Xray (Date:___)] : [unfilled] Xray -  [Doing Well] : is doing well [No Sign of Infection] : is showing no signs of infection [3] : the patient reports pain that is 3/10 in severity [Adequate Pain Control] : has adequate pain control [Chills] : no chills [Constipation] : no constipation [Diarrhea] : no diarrhea [Dysuria] : no dysuria [Fever] : no fever [Nausea] : no nausea [Vomiting] : no vomiting [Erythema] : not erythematous [Discharge] : absent of discharge [Swelling] : not swollen [Dehiscence] : not dehisced [de-identified] : S/P Left total hip arthroplasty. DOS- 10-1-20\par \par  [de-identified] : Pt is 3 weeks post-op. She is done with home PT. She is walking with walker. The pain is controlled with Percocet 10 mg. She c/o left knee pain and would like to have left TKA. She has advanced patellofemoral osteoarthritis of the left knee.  [de-identified] : Left hip exam shows healed incision with no sign of infection  [de-identified] : 1V xray of the left hip done in the office today and reviewed by Dr. German Guerrero demonstrates s/p implants in good positioning with no evidence of wear, loosening, or subsidence.  [de-identified] : We prescribed outpatient PT, pantoprazole, and Percocet today. Activity modifications and restrictions were discussed. Pt understands the importance of prophylaxis for invasive dental procedures. F/u with us in 3 weeks. \par \par \par The patient has advanced patellofemoral osteoarthritis of the left knee. She is a candidate for a left TKA and is interested in pursuing surgery. The pt is aware that she should try to stop taking pain medication prior to surgery. She scheduled her left TKA today. \par \par The patient is a 65 year individual with end stage arthritis of their left knee joint. Based upon the patient's continued symptoms and failure to respond to conservative treatment I have recommended a left total knee arthroplasty for this patient. A long discussion took place with the patient describing what a total joint replacement is and what the procedure would entail. A total knee arthroplasty model, similar to the implant that was used during the operation, was utilized to demonstrate and to discuss the various bearing surfaces of the implants. The hospitalization and post-operative care and rehabilitation were also discussed. The use of perioperative antibiotics and DVT prophylaxis were discussed. The risk, benefits and alternatives to a surgical intervention were discussed at length with the patient. The patient was also advised of risks related to the medical comorbidities, elevated body mass index (BMI), and smoking where applicable. We discussed how to reduce modifiable risk factors and encouraged smoking cessation were applicable.. A lengthy discussion took place to review the most common complications including but not limited to: deep vein thrombosis, pulmonary embolus, heart attack, stroke, infection, wound breakdown, numbness, damage to nerves, tendon, muscles, arteries or other blood vessels, death and other possible complications from anesthesia. The patient was told that we will take steps to minimize these risks by using sterile technique, antibiotics and DVT prophylaxis when appropriate and follow the patient postoperatively in the office setting to monitor progress. The possibility of recurrent pain, no improvement in pain and actual worsening of pain were also discussed with the patient.\par The discharge plan of care focused on the patient going home following surgery. The patient was encouraged to make the necessary arrangements to have someone stay with them when they are discharged home. Following discharge, a home care nurse was to the patient. The home care nurse would open the patient’s home care case and request home physical therapy services. Home physical therapy was to commence following discharge provided it was appropriate and covered by the health insurance benefit plan. \par The benefits of surgery were discussed with the patient including the potential for improving her current clinical condition through operative intervention. Alternatives to surgical intervention including continued conservative management were also discussed in detail. All questions were answered to the satisfaction of the patient. The treatment plan of care, as well as a model of a total knee arthroplasty equivalent to the one that will be used for their total joint replacement, was shared with the patient. The patient agreed to the plan of care as well as the use of implants in their total joint replacement.

## 2020-10-21 NOTE — END OF VISIT
[FreeTextEntry3] : I, Devan Farah, acted solely as a scribe for Dr. German Guerrero on this date 10/21/2020.

## 2020-11-24 ENCOUNTER — APPOINTMENT (OUTPATIENT)
Dept: ORTHOPEDIC SURGERY | Facility: CLINIC | Age: 65
End: 2020-11-24

## 2020-11-30 PROBLEM — M16.12 UNILATERAL PRIMARY OSTEOARTHRITIS, LEFT HIP: Chronic | Status: ACTIVE | Noted: 2020-09-17

## 2020-12-07 ENCOUNTER — APPOINTMENT (OUTPATIENT)
Dept: ORTHOPEDIC SURGERY | Facility: CLINIC | Age: 65
End: 2020-12-07
Payer: MEDICARE

## 2020-12-07 VITALS
DIASTOLIC BLOOD PRESSURE: 79 MMHG | SYSTOLIC BLOOD PRESSURE: 120 MMHG | BODY MASS INDEX: 27.6 KG/M2 | WEIGHT: 150 LBS | HEIGHT: 62 IN | HEART RATE: 82 BPM

## 2020-12-07 VITALS — TEMPERATURE: 96.7 F

## 2020-12-07 DIAGNOSIS — M17.12 UNILATERAL PRIMARY OSTEOARTHRITIS, LEFT KNEE: ICD-10-CM

## 2020-12-07 PROCEDURE — 99214 OFFICE O/P EST MOD 30 MIN: CPT | Mod: 24

## 2020-12-07 NOTE — DISCUSSION/SUMMARY
[de-identified] : 66 y/o F with chronic persistent left knee pain. Pt has failed to respond to cortisone injections. As a result, she deferred a cortisone injection today. The pt has exhausted all conservative therapies. She is scheduled for a left TKA on 2/15/2021. Her pain is worsening and requests to move her left TKA to a sooner date. We will try to move her surgery up. \par \par \par The patient is a 65 year old individual with end stage arthritis of their left knee joint. Based upon the patient's continued symptoms and failure to respond to conservative treatment I have recommended a left knee replacement for this patient. A long discussion took place with the patient describing what a total joint replacement is and what the procedure would entail. A total knee model, similar to the implant that will be used during the operation, was utilized to demonstrate and to discuss the various bearing surfaces of the implants. The hospitalization and post-operative care and rehabilitation were also discussed. The use of perioperative antibiotics and DVT prophylaxis were discussed. The risk, benefits and alternatives to a surgical intervention were discussed at length with the patient. The patient was also advised of risks related to the medical comorbidities and elevated body mass index (BMI).  A lengthy discussion took place to review the most common complications including but not limited to: deep vein thrombosis, pulmonary embolus, heart attack, stroke, infection, wound breakdown, numbness, damage to nerves, tendon, muscles, arteries or other blood vessels, death and other possible complications from anesthesia. The patient was told that we will take steps to minimize these risks by using sterile technique, antibiotics and DVT prophylaxis when appropriate and follow the patient postoperatively in the office setting to monitor progress. The possibility of recurrent pain, no improvement in pain and actual worsening of pain were also discussed with the patient.\par The discharge plan of care focused on the patient going home following surgery.  The patient was encouraged to make the necessary arrangements to have someone stay with them when they are discharged home.  Following discharge, a home care nurse will visit the patient.  The home care nurse will open your home care case and request home physical therapy services.  Home physical therapy will commence following discharge provided it is appropriate and covered by the health insurance benefit plan. \par The benefits of surgery were discussed with the patient including the potential for improving his/her current clinical condition through operative intervention. Alternatives to surgical intervention including continued conservative management were also discussed in detail. All questions were answered to the satisfaction of the patient. The treatment plan of care, as well as a model of a total knee equivalent to the one that will be used for their total joint replacement, was shared with the patient.  The patient agreed to the plan of care as well as the use of implants in their total joint replacement.\par

## 2020-12-07 NOTE — HISTORY OF PRESENT ILLNESS
[de-identified] : 64 y/o F presents with left knee pain. She is scheduled to have her left TKA on 2/15/2021. Her left knee pain has been worsening. She states that she is unable to walk due to the pain. The pt has received gel injections and cortisone injections in the past for this knee pain. The gel injections provided some relief; however, the cortisone injections did not provide relief. Her previous MRI showed a lateral meniscus tear. She underwent a left knee scope in late March 2020. She reports no improvement of pain after sx. Today the pain is generalized in the knee. She describes the pain as a sharp, throbbing pain. She notes walking aggravates the pain. Nothing in particular relieves the pain. She is now also complaining of difficulty standing for long periods of time. She has difficulty performing ADL's like doing laundry. Cortisone injection and H.A injection did not help much. She took Tylenol and NSAIDs (ibuprofen 800mg) which do not help. She is s/p left RITIKA 10/1/2020. She is happy with the surgical outcome.

## 2020-12-07 NOTE — PHYSICAL EXAM
[LE] : Sensory: Intact in bilateral lower extremities [ALL] : dorsalis pedis, posterior tibial, femoral, popliteal, and radial 2+ and symmetric bilaterally [Antalgic] : not antalgic [de-identified] : General: well-appearing, not in acute distress and not obese. \par GENERAL APPEARANCE: Well nourished and hydrated, pleasant, alert, and oriented x 3. Appears their stated age. \par HEENT: Normocephalic, extraocular eye motion intact. Nasal septum midline. Oral cavity clear. External auditory canal clear. \par CARDIOVASCULAR: No apparent abnormalities. No lower leg edema. No varicosities. Pedal pulses are palpable.\par NEUROLOGIC: Sensation is normal, no muscle weakness in the upper or lower extremities.\par DERMATOLOGIC: No apparent skin lesions, moist, warm, no rash.\par \par MUSCULOSKELETAL: Hands, wrists, and elbows are normal and move freely, shoulders are normal and move freely. \par Examination of the right knee shows a healed incision she has a moderate knee effusion range of motion is good full extension present flexion to 140° no instability noted, She does not have any knee instability. \par \par left knee examination shows healed the scope side slight varus alignment no joint effusion full range of motion without significant crepitus\par \par Motor: 5/5 motor strength in bilateral upper & lower extremities. Sensory: Intact in bilateral upper & lower extremities. DTRs: Biceps, brachioradialis, triceps, patellar, ankle and plantar 2+ and symmetric bilaterally. Pulses: dorsalis pedis, posterior tibial, femoral, popliteal, and radial 2+ and symmetric bilaterally. \par

## 2020-12-07 NOTE — END OF VISIT
[FreeTextEntry3] : I, Devan Farah, acted solely as a scribe for Dr. German Guerrero on this date 12/07/2020.

## 2020-12-23 ENCOUNTER — APPOINTMENT (OUTPATIENT)
Dept: ORTHOPEDIC SURGERY | Facility: CLINIC | Age: 65
End: 2020-12-23

## 2021-01-04 ENCOUNTER — OUTPATIENT (OUTPATIENT)
Dept: OUTPATIENT SERVICES | Facility: HOSPITAL | Age: 66
LOS: 1 days | End: 2021-01-04
Payer: MEDICARE

## 2021-01-04 ENCOUNTER — RESULT REVIEW (OUTPATIENT)
Age: 66
End: 2021-01-04

## 2021-01-04 VITALS
TEMPERATURE: 98 F | WEIGHT: 152.34 LBS | HEIGHT: 64 IN | SYSTOLIC BLOOD PRESSURE: 140 MMHG | RESPIRATION RATE: 17 BRPM | HEART RATE: 84 BPM | DIASTOLIC BLOOD PRESSURE: 88 MMHG

## 2021-01-04 DIAGNOSIS — L05.91 PILONIDAL CYST WITHOUT ABSCESS: Chronic | ICD-10-CM

## 2021-01-04 DIAGNOSIS — Z29.9 ENCOUNTER FOR PROPHYLACTIC MEASURES, UNSPECIFIED: ICD-10-CM

## 2021-01-04 DIAGNOSIS — Z98.89 OTHER SPECIFIED POSTPROCEDURAL STATES: Chronic | ICD-10-CM

## 2021-01-04 DIAGNOSIS — Z13.89 ENCOUNTER FOR SCREENING FOR OTHER DISORDER: ICD-10-CM

## 2021-01-04 DIAGNOSIS — Z01.818 ENCOUNTER FOR OTHER PREPROCEDURAL EXAMINATION: ICD-10-CM

## 2021-01-04 DIAGNOSIS — Z96.659 PRESENCE OF UNSPECIFIED ARTIFICIAL KNEE JOINT: Chronic | ICD-10-CM

## 2021-01-04 DIAGNOSIS — E78.5 HYPERLIPIDEMIA, UNSPECIFIED: ICD-10-CM

## 2021-01-04 DIAGNOSIS — M17.12 UNILATERAL PRIMARY OSTEOARTHRITIS, LEFT KNEE: ICD-10-CM

## 2021-01-04 LAB
A1C WITH ESTIMATED AVERAGE GLUCOSE RESULT: 5.7 % — HIGH (ref 4–5.6)
ALBUMIN SERPL ELPH-MCNC: 4.4 G/DL — SIGNIFICANT CHANGE UP (ref 3.3–5.2)
ALP SERPL-CCNC: 95 U/L — SIGNIFICANT CHANGE UP (ref 40–120)
ALT FLD-CCNC: 12 U/L — SIGNIFICANT CHANGE UP
ANION GAP SERPL CALC-SCNC: 8 MMOL/L — SIGNIFICANT CHANGE UP (ref 5–17)
APTT BLD: 45.2 SEC — HIGH (ref 27.5–35.5)
AST SERPL-CCNC: 20 U/L — SIGNIFICANT CHANGE UP
BASOPHILS # BLD AUTO: 0.04 K/UL — SIGNIFICANT CHANGE UP (ref 0–0.2)
BASOPHILS NFR BLD AUTO: 0.6 % — SIGNIFICANT CHANGE UP (ref 0–2)
BILIRUB SERPL-MCNC: 0.3 MG/DL — LOW (ref 0.4–2)
BLD GP AB SCN SERPL QL: SIGNIFICANT CHANGE UP
BUN SERPL-MCNC: 14 MG/DL — SIGNIFICANT CHANGE UP (ref 8–20)
CALCIUM SERPL-MCNC: 9.6 MG/DL — SIGNIFICANT CHANGE UP (ref 8.6–10.2)
CHLORIDE SERPL-SCNC: 102 MMOL/L — SIGNIFICANT CHANGE UP (ref 98–107)
CO2 SERPL-SCNC: 27 MMOL/L — SIGNIFICANT CHANGE UP (ref 22–29)
CREAT SERPL-MCNC: 0.78 MG/DL — SIGNIFICANT CHANGE UP (ref 0.5–1.3)
EOSINOPHIL # BLD AUTO: 0.1 K/UL — SIGNIFICANT CHANGE UP (ref 0–0.5)
EOSINOPHIL NFR BLD AUTO: 1.5 % — SIGNIFICANT CHANGE UP (ref 0–6)
ESTIMATED AVERAGE GLUCOSE: 117 MG/DL — HIGH (ref 68–114)
GLUCOSE SERPL-MCNC: 56 MG/DL — LOW (ref 70–99)
HCT VFR BLD CALC: 44.7 % — SIGNIFICANT CHANGE UP (ref 34.5–45)
HGB BLD-MCNC: 14.3 G/DL — SIGNIFICANT CHANGE UP (ref 11.5–15.5)
IMM GRANULOCYTES NFR BLD AUTO: 0.3 % — SIGNIFICANT CHANGE UP (ref 0–1.5)
INR BLD: 0.97 RATIO — SIGNIFICANT CHANGE UP (ref 0.88–1.16)
LYMPHOCYTES # BLD AUTO: 2.11 K/UL — SIGNIFICANT CHANGE UP (ref 1–3.3)
LYMPHOCYTES # BLD AUTO: 32.2 % — SIGNIFICANT CHANGE UP (ref 13–44)
MCHC RBC-ENTMCNC: 30.6 PG — SIGNIFICANT CHANGE UP (ref 27–34)
MCHC RBC-ENTMCNC: 32 GM/DL — SIGNIFICANT CHANGE UP (ref 32–36)
MCV RBC AUTO: 95.7 FL — SIGNIFICANT CHANGE UP (ref 80–100)
MONOCYTES # BLD AUTO: 0.54 K/UL — SIGNIFICANT CHANGE UP (ref 0–0.9)
MONOCYTES NFR BLD AUTO: 8.2 % — SIGNIFICANT CHANGE UP (ref 2–14)
MRSA PCR RESULT.: SIGNIFICANT CHANGE UP
NEUTROPHILS # BLD AUTO: 3.75 K/UL — SIGNIFICANT CHANGE UP (ref 1.8–7.4)
NEUTROPHILS NFR BLD AUTO: 57.2 % — SIGNIFICANT CHANGE UP (ref 43–77)
PLATELET # BLD AUTO: 162 K/UL — SIGNIFICANT CHANGE UP (ref 150–400)
POTASSIUM SERPL-MCNC: 4.5 MMOL/L — SIGNIFICANT CHANGE UP (ref 3.5–5.3)
POTASSIUM SERPL-SCNC: 4.5 MMOL/L — SIGNIFICANT CHANGE UP (ref 3.5–5.3)
PROT SERPL-MCNC: 7.2 G/DL — SIGNIFICANT CHANGE UP (ref 6.6–8.7)
PROTHROM AB SERPL-ACNC: 11.3 SEC — SIGNIFICANT CHANGE UP (ref 10.6–13.6)
RBC # BLD: 4.67 M/UL — SIGNIFICANT CHANGE UP (ref 3.8–5.2)
RBC # FLD: 12.7 % — SIGNIFICANT CHANGE UP (ref 10.3–14.5)
S AUREUS DNA NOSE QL NAA+PROBE: SIGNIFICANT CHANGE UP
SODIUM SERPL-SCNC: 137 MMOL/L — SIGNIFICANT CHANGE UP (ref 135–145)
WBC # BLD: 6.56 K/UL — SIGNIFICANT CHANGE UP (ref 3.8–10.5)
WBC # FLD AUTO: 6.56 K/UL — SIGNIFICANT CHANGE UP (ref 3.8–10.5)

## 2021-01-04 PROCEDURE — 93005 ELECTROCARDIOGRAM TRACING: CPT

## 2021-01-04 PROCEDURE — 93010 ELECTROCARDIOGRAM REPORT: CPT

## 2021-01-04 PROCEDURE — 71046 X-RAY EXAM CHEST 2 VIEWS: CPT | Mod: 26

## 2021-01-04 PROCEDURE — G0463: CPT

## 2021-01-04 PROCEDURE — 71046 X-RAY EXAM CHEST 2 VIEWS: CPT

## 2021-01-04 RX ORDER — ALPRAZOLAM 0.25 MG
0 TABLET ORAL
Qty: 0 | Refills: 0 | DISCHARGE

## 2021-01-04 NOTE — H&P PST ADULT - ASSESSMENT
66 y/o female scheduled for left total knee replacement 2021    OPIOID RISK TOOL    MIYA EACH BOX THAT APPLIES AND ADD TOTALS AT THE END    FAMILY HISTORY OF SUBSTANCE ABUSE                 FEMALE         MALE                                                Alcohol                             [  ]1 pt          [  ]3pts                                               Illegal Durgs                     [  ]2 pts        [  ]3pts                                               Rx Drugs                           [  ]4 pts        [  ]4 pts    PERSONAL HISTORY OF SUBSTANCE ABUSE                                                                                          Alcohol                             [  ]3 pts       [  ]3 pts                                               Illegal Drugs                     [  ]4 pts        [  ]4 pts                                               Rx Drugs                           [  ]5 pts        [  ]5 pts    AGE BETWEEN 16-45 YEARS                                      [  ]1 pt         [  ]1 pt    HISTORY OF PREADOLESCENT   SEXUAL ABUSE                                                             [  ]3 pts        [  ]0pts    PSYCHOLOGICAL DISEASE                     ADD, OCD, Bipolar, Schizophrenia        [  ]2 pts         [  ]2 pts                      Depression                                               [  ]1 pt           [  ]1 pt           SCORING TOTAL   (add numbers and type here)              (0)                                     A score of 3 or lower indicated LOW risk for future opioid abuse  A score of 4 to 7 indicated moderate risk for future opioid abuse  A score of 8 or higher indicates a high risk for opioid abuse    CAPRINI SCORE [CLOT]    AGE RELATED RISK FACTORS                                                       MOBILITY RELATED FACTORS  [ ] Age 41-60 years                                            (1 Point)                  [ ] Bed rest                                                        (1 Point)  [ x] Age: 61-74 years                                           (2 Points)                 [ ] Plaster cast                                                   (2 Points)  [ ] Age= 75 years                                              (3 Points)                 [ ] Bed bound for more than 72 hours                 (2 Points)    DISEASE RELATED RISK FACTORS                                               GENDER SPECIFIC FACTORS  [ ] Edema in the lower extremities                       (1 Point)                  [ ] Pregnancy                                                     (1 Point)  [ ] Varicose veins                                               (1 Point)                  [ ] Post-partum < 6 weeks                                   (1 Point)             [ x] BMI > 25 Kg/m2                                            (1 Point)                  [ ] Hormonal therapy  or oral contraception          (1 Point)                 [ ] Sepsis (in the previous month)                        (1 Point)                  [ ] History of pregnancy complications                 (1 point)  [ ] Pneumonia or serious lung disease                                               [ ] Unexplained or recurrent                     (1 Point)           (in the previous month)                               (1 Point)  [ ] Abnormal pulmonary function test                     (1 Point)                 SURGERY RELATED RISK FACTORS  [ ] Acute myocardial infarction                              (1 Point)                 [ ]  Section                                             (1 Point)  [ ] Congestive heart failure (in the previous month)  (1 Point)               [ ] Minor surgery                                                  (1 Point)   [ ] Inflammatory bowel disease                             (1 Point)                 [ ] Arthroscopic surgery                                        (2 Points)  [ ] Central venous access                                      (2 Points)                [ x] General surgery lasting more than 45 minutes   (2 Points)       [ ] Stroke (in the previous month)                          (5 Points)               [ ] Elective arthroplasty                                         (5 Points)                                                                                                                                               HEMATOLOGY RELATED FACTORS                                                 TRAUMA RELATED RISK FACTORS  [ ] Prior episodes of VTE                                     (3 Points)                [ ] Fracture of the hip, pelvis, or leg                       (5 Points)  [ ] Positive family history for VTE                         (3 Points)                 [ ] Acute spinal cord injury (in the previous month)  (5 Points)  [ ] Prothrombin 75592 A                                     (3 Points)                 [ ] Paralysis  (less than 1 month)                             (5 Points)  [ ] Factor V Leiden                                             (3 Points)                  [ ] Multiple Trauma within 1 month                        (5 Points)  [ ] Lupus anticoagulants                                     (3 Points)                                                           [ ] Anticardiolipin antibodies                               (3 Points)                                                       [ ] High homocysteine in the blood                      (3 Points)                                             [ ] Other congenital or acquired thrombophilia      (3 Points)                                                [ ] Heparin induced thrombocytopenia                  (3 Points)                                          Total Score [   5       ]    Caprini Score 0 - 2:  Low Risk, No VTE Prophylaxis required for most patients, encourage ambulation  Caprini Score 3 - 6:  At Risk, pharmacologic VTE prophylaxis is indicated for most patients (in the absence of a contraindication)  Caprini Score Greater than or = 7:  High Risk, pharmacologic VTE prophylaxis is indicated for most patients (in the absence of a contraindication)

## 2021-01-04 NOTE — H&P PST ADULT - NSICDXPROBLEM_GEN_ALL_CORE_FT
PROBLEM DIAGNOSES  Problem: Unilateral primary osteoarthritis, left knee  Assessment and Plan: Scheduled for left total knee replacement 1/19/2021  Medical clearance  Cardiac clearance    Problem: Need for prophylactic measure  Assessment and Plan: Deepikai 5 moderate risk  Primary team please assess need for DVT prophylaxis    Problem: Screening for substance abuse  Assessment and Plan: ORT 0    Problem: Hyperlipemia  Assessment and Plan: Follows with PCP

## 2021-01-04 NOTE — H&P PST ADULT - EKG AND INTERPRETATION
Normal sinus rhythm  low voltage qrs  cannot rule out anterior infarct  pending final interpretation
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
## 2021-01-04 NOTE — H&P PST ADULT - NSICDXFAMILYHX_GEN_ALL_CORE_FT
FAMILY HISTORY:  Father  Still living? No  Family history of coronary artery disease, Age at diagnosis: Age Unknown    Sibling  Still living? Yes, Estimated age: 51-60  Family history of diabetes mellitus, Age at diagnosis: 51-60  Family history of diabetes mellitus, Age at diagnosis: 51-60    Uncle  Still living? Unknown  Family history of diabetes mellitus, Age at diagnosis: Age Unknown

## 2021-01-04 NOTE — H&P PST ADULT - HISTORY OF PRESENT ILLNESS
66 y/o female presents to PST today. Patient reports arthritis to left knee for about 2 years. Patient noted that radiology results reported bone on bone October 2020. Reports taking ibuprofen for pain with some relief. Movements make pain worse. Laying and sitting alleviate pain; Denies any fever, chills, chest pain, SOB. Scheduled for  66 y/o female presents to [facility] today. Patient reports a hx of left knee arthritis for about 2 years. Patient reported that radiology results on October 2020 revealed bone on bone. Reports taking ibuprofen for pain with some relief. Initially took percocet. Movements aggravate pain and laying down or sitting alleviate pain. Denies any fever, chills, chest pain, SOB. Scheduled for left total knee replacement 1/19/2021

## 2021-01-04 NOTE — H&P PST ADULT - RESPIRATORY RATE (BREATHS/MIN)
Pt in due to pain x 2 weeks. He states that it starts in his left abdominal area and radiates to his left testicle. Pt says that he was at the ER last week and was told to come back if the pain continuous or gets worse. Abdomen is tender and rigid, bowel sounds present. Pt states he has not had a regular bowel movement in 1 week. His BW are small della, very dark in color per pt. Greek speaker. Denies chest pain or SOB. No injury or illness recently. Not able to provide urine sample at this time, awaiting sample.        Oscar Daigle RN  03/13/20 [id number]

## 2021-01-06 PROBLEM — K21.9 GASTRO-ESOPHAGEAL REFLUX DISEASE WITHOUT ESOPHAGITIS: Chronic | Status: ACTIVE | Noted: 2021-01-04

## 2021-01-16 ENCOUNTER — APPOINTMENT (OUTPATIENT)
Dept: DISASTER EMERGENCY | Facility: CLINIC | Age: 66
End: 2021-01-16

## 2021-01-18 ENCOUNTER — TRANSCRIPTION ENCOUNTER (OUTPATIENT)
Age: 66
End: 2021-01-18

## 2021-01-18 LAB — SARS-COV-2 N GENE NPH QL NAA+PROBE: NOT DETECTED

## 2021-01-19 ENCOUNTER — INPATIENT (INPATIENT)
Facility: HOSPITAL | Age: 66
LOS: 1 days | Discharge: HOME | DRG: 470 | End: 2021-01-21
Attending: ORTHOPAEDIC SURGERY | Admitting: ORTHOPAEDIC SURGERY
Payer: MEDICARE

## 2021-01-19 ENCOUNTER — APPOINTMENT (OUTPATIENT)
Dept: ORTHOPEDIC SURGERY | Facility: HOSPITAL | Age: 66
End: 2021-01-19

## 2021-01-19 ENCOUNTER — TRANSCRIPTION ENCOUNTER (OUTPATIENT)
Age: 66
End: 2021-01-19

## 2021-01-19 VITALS
SYSTOLIC BLOOD PRESSURE: 120 MMHG | HEIGHT: 64 IN | RESPIRATION RATE: 15 BRPM | WEIGHT: 145.06 LBS | OXYGEN SATURATION: 99 % | TEMPERATURE: 98 F | DIASTOLIC BLOOD PRESSURE: 70 MMHG | HEART RATE: 70 BPM

## 2021-01-19 DIAGNOSIS — Z98.89 OTHER SPECIFIED POSTPROCEDURAL STATES: Chronic | ICD-10-CM

## 2021-01-19 DIAGNOSIS — L05.91 PILONIDAL CYST WITHOUT ABSCESS: Chronic | ICD-10-CM

## 2021-01-19 DIAGNOSIS — M17.12 UNILATERAL PRIMARY OSTEOARTHRITIS, LEFT KNEE: ICD-10-CM

## 2021-01-19 DIAGNOSIS — Z96.659 PRESENCE OF UNSPECIFIED ARTIFICIAL KNEE JOINT: Chronic | ICD-10-CM

## 2021-01-19 LAB
BLD GP AB SCN SERPL QL: SIGNIFICANT CHANGE UP
GLUCOSE BLDC GLUCOMTR-MCNC: 132 MG/DL — HIGH (ref 70–99)
GLUCOSE BLDC GLUCOMTR-MCNC: 86 MG/DL — SIGNIFICANT CHANGE UP (ref 70–99)

## 2021-01-19 PROCEDURE — 20985 CPTR-ASST DIR MS PX: CPT

## 2021-01-19 PROCEDURE — 73560 X-RAY EXAM OF KNEE 1 OR 2: CPT | Mod: 26,LT

## 2021-01-19 PROCEDURE — 27447 TOTAL KNEE ARTHROPLASTY: CPT | Mod: LT

## 2021-01-19 PROCEDURE — 27447 TOTAL KNEE ARTHROPLASTY: CPT | Mod: AS,LT

## 2021-01-19 PROCEDURE — 99222 1ST HOSP IP/OBS MODERATE 55: CPT

## 2021-01-19 RX ORDER — SIMVASTATIN 20 MG/1
20 TABLET, FILM COATED ORAL AT BEDTIME
Refills: 0 | Status: DISCONTINUED | OUTPATIENT
Start: 2021-01-19 | End: 2021-01-21

## 2021-01-19 RX ORDER — OXYCODONE AND ACETAMINOPHEN 5; 325 MG/1; MG/1
2 TABLET ORAL EVERY 4 HOURS
Refills: 0 | Status: DISCONTINUED | OUTPATIENT
Start: 2021-01-19 | End: 2021-01-21

## 2021-01-19 RX ORDER — GABAPENTIN 400 MG/1
400 CAPSULE ORAL
Refills: 0 | Status: DISCONTINUED | OUTPATIENT
Start: 2021-01-19 | End: 2021-01-21

## 2021-01-19 RX ORDER — CELECOXIB 200 MG/1
400 CAPSULE ORAL ONCE
Refills: 0 | Status: COMPLETED | OUTPATIENT
Start: 2021-01-19 | End: 2021-01-19

## 2021-01-19 RX ORDER — ACETAMINOPHEN 500 MG
975 TABLET ORAL ONCE
Refills: 0 | Status: COMPLETED | OUTPATIENT
Start: 2021-01-19 | End: 2021-01-19

## 2021-01-19 RX ORDER — APREPITANT 80 MG/1
40 CAPSULE ORAL ONCE
Refills: 0 | Status: COMPLETED | OUTPATIENT
Start: 2021-01-19 | End: 2021-01-19

## 2021-01-19 RX ORDER — KETOROLAC TROMETHAMINE 30 MG/ML
15 SYRINGE (ML) INJECTION EVERY 6 HOURS
Refills: 0 | Status: DISCONTINUED | OUTPATIENT
Start: 2021-01-19 | End: 2021-01-20

## 2021-01-19 RX ORDER — QUETIAPINE FUMARATE 200 MG/1
25 TABLET, FILM COATED ORAL AT BEDTIME
Refills: 0 | Status: DISCONTINUED | OUTPATIENT
Start: 2021-01-19 | End: 2021-01-21

## 2021-01-19 RX ORDER — TRANEXAMIC ACID 100 MG/ML
1000 INJECTION, SOLUTION INTRAVENOUS ONCE
Refills: 0 | Status: DISCONTINUED | OUTPATIENT
Start: 2021-01-19 | End: 2021-01-19

## 2021-01-19 RX ORDER — ASPIRIN/CALCIUM CARB/MAGNESIUM 324 MG
325 TABLET ORAL
Refills: 0 | Status: DISCONTINUED | OUTPATIENT
Start: 2021-01-19 | End: 2021-01-21

## 2021-01-19 RX ORDER — SODIUM CHLORIDE 9 MG/ML
3 INJECTION INTRAMUSCULAR; INTRAVENOUS; SUBCUTANEOUS EVERY 8 HOURS
Refills: 0 | Status: DISCONTINUED | OUTPATIENT
Start: 2021-01-19 | End: 2021-01-19

## 2021-01-19 RX ORDER — ONDANSETRON 8 MG/1
4 TABLET, FILM COATED ORAL EVERY 6 HOURS
Refills: 0 | Status: DISCONTINUED | OUTPATIENT
Start: 2021-01-19 | End: 2021-01-21

## 2021-01-19 RX ORDER — ACETAMINOPHEN 500 MG
975 TABLET ORAL EVERY 8 HOURS
Refills: 0 | Status: DISCONTINUED | OUTPATIENT
Start: 2021-01-19 | End: 2021-01-19

## 2021-01-19 RX ORDER — CELECOXIB 200 MG/1
200 CAPSULE ORAL EVERY 12 HOURS
Refills: 0 | Status: DISCONTINUED | OUTPATIENT
Start: 2021-01-20 | End: 2021-01-21

## 2021-01-19 RX ORDER — OXYCODONE AND ACETAMINOPHEN 5; 325 MG/1; MG/1
1 TABLET ORAL
Refills: 0 | Status: DISCONTINUED | OUTPATIENT
Start: 2021-01-19 | End: 2021-01-21

## 2021-01-19 RX ORDER — HYDROMORPHONE HYDROCHLORIDE 2 MG/ML
4 INJECTION INTRAMUSCULAR; INTRAVENOUS; SUBCUTANEOUS
Refills: 0 | Status: DISCONTINUED | OUTPATIENT
Start: 2021-01-19 | End: 2021-01-21

## 2021-01-19 RX ORDER — PANTOPRAZOLE SODIUM 20 MG/1
40 TABLET, DELAYED RELEASE ORAL
Refills: 0 | Status: DISCONTINUED | OUTPATIENT
Start: 2021-01-19 | End: 2021-01-21

## 2021-01-19 RX ORDER — MAGNESIUM HYDROXIDE 400 MG/1
30 TABLET, CHEWABLE ORAL DAILY
Refills: 0 | Status: DISCONTINUED | OUTPATIENT
Start: 2021-01-19 | End: 2021-01-21

## 2021-01-19 RX ORDER — CEFAZOLIN SODIUM 1 G
2000 VIAL (EA) INJECTION ONCE
Refills: 0 | Status: DISCONTINUED | OUTPATIENT
Start: 2021-01-19 | End: 2021-01-19

## 2021-01-19 RX ORDER — SODIUM CHLORIDE 9 MG/ML
500 INJECTION INTRAMUSCULAR; INTRAVENOUS; SUBCUTANEOUS ONCE
Refills: 0 | Status: COMPLETED | OUTPATIENT
Start: 2021-01-19 | End: 2021-01-19

## 2021-01-19 RX ORDER — HYDROMORPHONE HYDROCHLORIDE 2 MG/ML
0.5 INJECTION INTRAMUSCULAR; INTRAVENOUS; SUBCUTANEOUS
Refills: 0 | Status: DISCONTINUED | OUTPATIENT
Start: 2021-01-19 | End: 2021-01-21

## 2021-01-19 RX ORDER — ALPRAZOLAM 0.25 MG
0.5 TABLET ORAL THREE TIMES A DAY
Refills: 0 | Status: DISCONTINUED | OUTPATIENT
Start: 2021-01-19 | End: 2021-01-21

## 2021-01-19 RX ORDER — SIMVASTATIN 20 MG/1
40 TABLET, FILM COATED ORAL AT BEDTIME
Refills: 0 | Status: DISCONTINUED | OUTPATIENT
Start: 2021-01-19 | End: 2021-01-21

## 2021-01-19 RX ORDER — SENNA PLUS 8.6 MG/1
2 TABLET ORAL AT BEDTIME
Refills: 0 | Status: DISCONTINUED | OUTPATIENT
Start: 2021-01-19 | End: 2021-01-21

## 2021-01-19 RX ORDER — CEFAZOLIN SODIUM 1 G
2000 VIAL (EA) INJECTION
Refills: 0 | Status: COMPLETED | OUTPATIENT
Start: 2021-01-19 | End: 2021-01-20

## 2021-01-19 RX ORDER — SODIUM CHLORIDE 9 MG/ML
1000 INJECTION, SOLUTION INTRAVENOUS
Refills: 0 | Status: DISCONTINUED | OUTPATIENT
Start: 2021-01-19 | End: 2021-01-19

## 2021-01-19 RX ORDER — OXYCODONE HYDROCHLORIDE 5 MG/1
10 TABLET ORAL
Refills: 0 | Status: DISCONTINUED | OUTPATIENT
Start: 2021-01-19 | End: 2021-01-19

## 2021-01-19 RX ORDER — OXYCODONE HYDROCHLORIDE 5 MG/1
5 TABLET ORAL
Refills: 0 | Status: DISCONTINUED | OUTPATIENT
Start: 2021-01-19 | End: 2021-01-19

## 2021-01-19 RX ORDER — SODIUM CHLORIDE 9 MG/ML
1000 INJECTION INTRAMUSCULAR; INTRAVENOUS; SUBCUTANEOUS
Refills: 0 | Status: DISCONTINUED | OUTPATIENT
Start: 2021-01-19 | End: 2021-01-21

## 2021-01-19 RX ADMIN — Medication 975 MILLIGRAM(S): at 08:10

## 2021-01-19 RX ADMIN — Medication 100 MILLIGRAM(S): at 17:11

## 2021-01-19 RX ADMIN — QUETIAPINE FUMARATE 25 MILLIGRAM(S): 200 TABLET, FILM COATED ORAL at 21:55

## 2021-01-19 RX ADMIN — SENNA PLUS 2 TABLET(S): 8.6 TABLET ORAL at 21:55

## 2021-01-19 RX ADMIN — Medication 15 MILLIGRAM(S): at 16:56

## 2021-01-19 RX ADMIN — CELECOXIB 400 MILLIGRAM(S): 200 CAPSULE ORAL at 08:10

## 2021-01-19 RX ADMIN — OXYCODONE HYDROCHLORIDE 10 MILLIGRAM(S): 5 TABLET ORAL at 16:56

## 2021-01-19 RX ADMIN — GABAPENTIN 400 MILLIGRAM(S): 400 CAPSULE ORAL at 17:11

## 2021-01-19 RX ADMIN — SODIUM CHLORIDE 500 MILLILITER(S): 9 INJECTION INTRAMUSCULAR; INTRAVENOUS; SUBCUTANEOUS at 12:15

## 2021-01-19 RX ADMIN — SIMVASTATIN 40 MILLIGRAM(S): 20 TABLET, FILM COATED ORAL at 21:55

## 2021-01-19 RX ADMIN — OXYCODONE AND ACETAMINOPHEN 2 TABLET(S): 5; 325 TABLET ORAL at 20:20

## 2021-01-19 RX ADMIN — Medication 325 MILLIGRAM(S): at 21:55

## 2021-01-19 RX ADMIN — APREPITANT 40 MILLIGRAM(S): 80 CAPSULE ORAL at 08:10

## 2021-01-19 NOTE — DISCHARGE NOTE PROVIDER - NSDCFUSCHEDAPPT_GEN_ALL_CORE_FT
TRISHA LOVE ADAMS ; 02/08/2021 ; [facility] [address] [facility] TRISHA Carlson ; 03/08/2021 ; [facility] [address] [facility] TRISHA Carlson ; 04/12/2021 ; [facility] [address] [facility]
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
## 2021-01-19 NOTE — DISCHARGE NOTE PROVIDER - CARE PROVIDER_API CALL
German Guerrero)  Orthopaedic Surgery  [address]  [place] [address]  Phone: [phone]  Fax: [phone]  Follow Up Time:

## 2021-01-19 NOTE — CONSULT NOTE ADULT - ASSESSMENT
66 y/o female with PMH of anxiety, smoker, TIA, PFO s/p closure, HPL with chronic left knee pain with failed conservative measures limiting his daily activities presented for an elective left knee replacement She is now s/p Left knee arthroplasty.    # Left knee OA-s/p Left TKR   Pain control   Bowel regimen   Incentive spirometry  DVT px - per ortho   PT    # Tobacco abuse  - nicotine patch - not interested in quitting   # hx TIA, PFO closure - not on ASA   # Anxiety - takes xanax 2mg qid  seroquel 25 QID     medications were confirmed with [facility] pharmacy  labs in am   will follow

## 2021-01-19 NOTE — PHYSICAL THERAPY INITIAL EVALUATION ADULT - PERSONAL SAFETY AND JUDGMENT, REHAB EVAL
Called Maggie back and told her that Dr. Crow has not prescribed either of the medications in question. She will try to figure this out.   intact

## 2021-01-19 NOTE — DISCHARGE NOTE PROVIDER - NSDCMRMEDTOKEN_GEN_ALL_CORE_FT
aspirin 325 mg oral delayed release tablet: 1 tab(s) orally 2 times a day  ibuprofen 800 mg oral tablet:   oxyCODONE 10 mg oral tablet: 1 tab(s) orally every 4 to 6 hours, As Needed  Pain MDD:6 pt may halve tablets   QUEtiapine: 800 milligram(s) orally once a day (at bedtime)  simvastatin 40 mg oral tablet: 1 tab(s) orally once a day (at bedtime)  Xanax 2 mg oral tablet:    Aspirin Enteric Coated 325 mg oral delayed release tablet: 1 tab(s) orally 2 times a day MDD:2  cefadroxil 500 mg oral capsule: 1 cap(s) orally 2 times a day   CeleBREX 200 mg oral capsule: 1 cap(s) orally 2 times a day x 21 days   omeprazole 20 mg oral delayed release capsule: 1 cap(s) orally once a day before breakfast MDD:1  oxycodone-acetaminophen 5 mg-325 mg oral tablet: 1-2  tab(s) orally every 4 to 6 hours, As Needed -for severe pain MDD:8   QUEtiapine: 800 milligram(s) orally once a day (at bedtime)  Senna S 50 mg-8.6 mg oral tablet: 2 tab(s) orally once a day (at bedtime)   simvastatin 40 mg oral tablet: 1 tab(s) orally once a day (at bedtime)  Xanax 2 mg oral tablet:

## 2021-01-19 NOTE — PHYSICAL THERAPY INITIAL EVALUATION ADULT - GENERAL OBSERVATIONS, REHAB EVAL
Pt received in bed, + IV, + bilateral venous compression boots, breathing on RA in NAD, in 5/10 Left Knee pain, agreeable to PT evaluation

## 2021-01-19 NOTE — PHYSICAL THERAPY INITIAL EVALUATION ADULT - LEVEL OF INDEPENDENCE: STAIR NEGOTIATION, REHAB EVAL
pt declining stair assessment at this time secondary to increase in Left knee pain to now a 7/10 pain. Pt wishes to return to her room.

## 2021-01-19 NOTE — DISCHARGE NOTE PROVIDER - HOSPITAL COURSE
The patient underwent a LEFT TOTAL KNEE REPLACEMENT on 1/19/21. The patient received antibiotics consistent with SCIP guidelines. The patient underwent the procedure and had no intra-operative complications. Post-operatively, the patient was seen by medicine and PT. The patient received ASA for clot prevention. The patient received pain medications per orthopedic pain management pathway and the pain was appropriately controlled. The patient did not have any post-operative medical complications. The patient was discharged in stable condition.

## 2021-01-19 NOTE — PHYSICAL THERAPY INITIAL EVALUATION ADULT - ADDITIONAL COMMENTS
Pt reports living in a private home with her  who works during the night able to assist as needed during the day when he's not sleeping. Pt has 4 steps to enter with 1 handrail and flight of stairs to her bedroom/bath. Pt owns RW, SAC, shower chair, and commode. Independent prior to admission. Pt drives & does not work.

## 2021-01-19 NOTE — PHYSICAL THERAPY INITIAL EVALUATION ADULT - RANGE OF MOTION EXAMINATION, REHAB EVAL
Left Knee AROM flexion/extension decreased by 25% otherwise Left LE WNL/Right LE ROM was WNL (within normal limits)

## 2021-01-20 LAB
ANION GAP SERPL CALC-SCNC: 9 MMOL/L — SIGNIFICANT CHANGE UP (ref 5–17)
BUN SERPL-MCNC: 11 MG/DL — SIGNIFICANT CHANGE UP (ref 8–20)
CALCIUM SERPL-MCNC: 8.6 MG/DL — SIGNIFICANT CHANGE UP (ref 8.6–10.2)
CHLORIDE SERPL-SCNC: 108 MMOL/L — HIGH (ref 98–107)
CO2 SERPL-SCNC: 22 MMOL/L — SIGNIFICANT CHANGE UP (ref 22–29)
CREAT SERPL-MCNC: 0.62 MG/DL — SIGNIFICANT CHANGE UP (ref 0.5–1.3)
GLUCOSE SERPL-MCNC: 179 MG/DL — HIGH (ref 70–99)
HCT VFR BLD CALC: 34.4 % — LOW (ref 34.5–45)
HGB BLD-MCNC: 11.3 G/DL — LOW (ref 11.5–15.5)
MCHC RBC-ENTMCNC: 30.8 PG — SIGNIFICANT CHANGE UP (ref 27–34)
MCHC RBC-ENTMCNC: 32.8 GM/DL — SIGNIFICANT CHANGE UP (ref 32–36)
MCV RBC AUTO: 93.7 FL — SIGNIFICANT CHANGE UP (ref 80–100)
PLATELET # BLD AUTO: 171 K/UL — SIGNIFICANT CHANGE UP (ref 150–400)
POTASSIUM SERPL-MCNC: 4.2 MMOL/L — SIGNIFICANT CHANGE UP (ref 3.5–5.3)
POTASSIUM SERPL-SCNC: 4.2 MMOL/L — SIGNIFICANT CHANGE UP (ref 3.5–5.3)
RBC # BLD: 3.67 M/UL — LOW (ref 3.8–5.2)
RBC # FLD: 12.7 % — SIGNIFICANT CHANGE UP (ref 10.3–14.5)
SODIUM SERPL-SCNC: 138 MMOL/L — SIGNIFICANT CHANGE UP (ref 135–145)
WBC # BLD: 12.59 K/UL — HIGH (ref 3.8–10.5)
WBC # FLD AUTO: 12.59 K/UL — HIGH (ref 3.8–10.5)

## 2021-01-20 PROCEDURE — 99232 SBSQ HOSP IP/OBS MODERATE 35: CPT

## 2021-01-20 RX ADMIN — OXYCODONE AND ACETAMINOPHEN 2 TABLET(S): 5; 325 TABLET ORAL at 15:13

## 2021-01-20 RX ADMIN — CELECOXIB 200 MILLIGRAM(S): 200 CAPSULE ORAL at 17:07

## 2021-01-20 RX ADMIN — Medication 15 MILLIGRAM(S): at 00:23

## 2021-01-20 RX ADMIN — PANTOPRAZOLE SODIUM 40 MILLIGRAM(S): 20 TABLET, DELAYED RELEASE ORAL at 05:31

## 2021-01-20 RX ADMIN — HYDROMORPHONE HYDROCHLORIDE 4 MILLIGRAM(S): 2 INJECTION INTRAMUSCULAR; INTRAVENOUS; SUBCUTANEOUS at 07:58

## 2021-01-20 RX ADMIN — OXYCODONE AND ACETAMINOPHEN 2 TABLET(S): 5; 325 TABLET ORAL at 10:57

## 2021-01-20 RX ADMIN — GABAPENTIN 400 MILLIGRAM(S): 400 CAPSULE ORAL at 17:07

## 2021-01-20 RX ADMIN — SENNA PLUS 2 TABLET(S): 8.6 TABLET ORAL at 21:21

## 2021-01-20 RX ADMIN — Medication 325 MILLIGRAM(S): at 05:31

## 2021-01-20 RX ADMIN — OXYCODONE AND ACETAMINOPHEN 2 TABLET(S): 5; 325 TABLET ORAL at 04:20

## 2021-01-20 RX ADMIN — Medication 15 MILLIGRAM(S): at 11:57

## 2021-01-20 RX ADMIN — GABAPENTIN 400 MILLIGRAM(S): 400 CAPSULE ORAL at 05:31

## 2021-01-20 RX ADMIN — Medication 325 MILLIGRAM(S): at 17:07

## 2021-01-20 RX ADMIN — Medication 15 MILLIGRAM(S): at 05:31

## 2021-01-20 RX ADMIN — Medication 100 MILLIGRAM(S): at 00:23

## 2021-01-20 RX ADMIN — OXYCODONE AND ACETAMINOPHEN 2 TABLET(S): 5; 325 TABLET ORAL at 19:45

## 2021-01-20 RX ADMIN — SIMVASTATIN 40 MILLIGRAM(S): 20 TABLET, FILM COATED ORAL at 21:21

## 2021-01-20 RX ADMIN — CELECOXIB 200 MILLIGRAM(S): 200 CAPSULE ORAL at 05:31

## 2021-01-20 NOTE — OCCUPATIONAL THERAPY INITIAL EVALUATION ADULT - LIVES WITH, PROFILE
and brother, in a private house with 3 steps to enter with railing and 8 steps inside with railing/other relative/spouse

## 2021-01-20 NOTE — SBIRT NOTE ADULT - NSSBIRTALCPOSREINDET_GEN_A_CORE
Pt states she may drink more for occasions, states she drinks once a week with friends. SBIRT screen and SW's role explained. Pt declined resources or referrals. Pt aware SW is available to her as needed.

## 2021-01-20 NOTE — OCCUPATIONAL THERAPY INITIAL EVALUATION ADULT - FINE MOTOR COORDINATION, LEFT HAND THUMB/FINGER OPPOSITION SKILLS, OT EVAL
Progress Notes by Yuni Martinez MD at 12/22/17 09:22 AM     Author:  Yuni Martinez MD Service:  (none) Author Type:  Physician     Filed:  12/22/17 09:24 AM Encounter Date:  12/22/2017 Status:  Signed     :  Yuni Martinez MD (Physician)            [facility]     PROGRESS NOTE     PATIENT NAME: Janusz Mora    DATE: 12/22/2017 MED REC #: [id number]  YOB: 1959   PHYSICIAN: Yuni Martinez MD        SUBJECTIVE:[NS1.1T]   Follow-up right shoulder arthroscopy with debridement, biceps tenotomy, open biceps tenodesis with subscapularis repair 12/14/2017.  Patient reports doing well, having not much discomfort in the shoulder.  Similar to previous shoulder surgery, he is reporting discomfort in the right elbow which he believes is limited due to lack of motion.  Reports no numbness or tingling in the fingers.[NS1.1M]     OBJECTIVE:[NS1.1T]   Surgical incisions well approximated.  Gentle range of motion to the elbow well-tolerated.  No soft tissue swelling.  Sensorimotor function intact distally.[NS1.1M]     ASSESSMENT/PLAN:[NS1.1T]   Recommend full active range of motion right shoulder, therapy per biceps tenodesis protocol.  May discontinue sling for 4 weeks postop.  Follow-up in 6 weeks to assess progress.  Currently he remains off work, and may call if he wishes to get a note to return to limited duty with the right upper extremity.[NS1.1M]           ______________________________  Yuni Martinez MD  ORTHOPAEDICS[NS1.1T]        Revision History        User Key Date/Time User Provider Type Action    > NS1.1 12/22/17 09:24 AM Yuni Martinez MD Physician Sign    M - Manual, T - Template             normal performance

## 2021-01-21 ENCOUNTER — TRANSCRIPTION ENCOUNTER (OUTPATIENT)
Age: 66
End: 2021-01-21

## 2021-01-21 VITALS
OXYGEN SATURATION: 95 % | TEMPERATURE: 97 F | SYSTOLIC BLOOD PRESSURE: 110 MMHG | RESPIRATION RATE: 18 BRPM | DIASTOLIC BLOOD PRESSURE: 80 MMHG | HEART RATE: 67 BPM

## 2021-01-21 LAB
ANION GAP SERPL CALC-SCNC: 9 MMOL/L — SIGNIFICANT CHANGE UP (ref 5–17)
BUN SERPL-MCNC: 17 MG/DL — SIGNIFICANT CHANGE UP (ref 8–20)
CALCIUM SERPL-MCNC: 8.9 MG/DL — SIGNIFICANT CHANGE UP (ref 8.6–10.2)
CHLORIDE SERPL-SCNC: 109 MMOL/L — HIGH (ref 98–107)
CO2 SERPL-SCNC: 23 MMOL/L — SIGNIFICANT CHANGE UP (ref 22–29)
CREAT SERPL-MCNC: 0.73 MG/DL — SIGNIFICANT CHANGE UP (ref 0.5–1.3)
GLUCOSE SERPL-MCNC: 96 MG/DL — SIGNIFICANT CHANGE UP (ref 70–99)
HCT VFR BLD CALC: 32.5 % — LOW (ref 34.5–45)
HGB BLD-MCNC: 10.4 G/DL — LOW (ref 11.5–15.5)
MCHC RBC-ENTMCNC: 30.5 PG — SIGNIFICANT CHANGE UP (ref 27–34)
MCHC RBC-ENTMCNC: 32 GM/DL — SIGNIFICANT CHANGE UP (ref 32–36)
MCV RBC AUTO: 95.3 FL — SIGNIFICANT CHANGE UP (ref 80–100)
PLATELET # BLD AUTO: 116 K/UL — LOW (ref 150–400)
POTASSIUM SERPL-MCNC: 4.2 MMOL/L — SIGNIFICANT CHANGE UP (ref 3.5–5.3)
POTASSIUM SERPL-SCNC: 4.2 MMOL/L — SIGNIFICANT CHANGE UP (ref 3.5–5.3)
RBC # BLD: 3.41 M/UL — LOW (ref 3.8–5.2)
RBC # FLD: 13.2 % — SIGNIFICANT CHANGE UP (ref 10.3–14.5)
SODIUM SERPL-SCNC: 141 MMOL/L — SIGNIFICANT CHANGE UP (ref 135–145)
WBC # BLD: 6.87 K/UL — SIGNIFICANT CHANGE UP (ref 3.8–10.5)
WBC # FLD AUTO: 6.87 K/UL — SIGNIFICANT CHANGE UP (ref 3.8–10.5)

## 2021-01-21 PROCEDURE — 86850 RBC ANTIBODY SCREEN: CPT

## 2021-01-21 PROCEDURE — 97116 GAIT TRAINING THERAPY: CPT

## 2021-01-21 PROCEDURE — 86901 BLOOD TYPING SEROLOGIC RH(D): CPT

## 2021-01-21 PROCEDURE — 97167 OT EVAL HIGH COMPLEX 60 MIN: CPT

## 2021-01-21 PROCEDURE — 73560 X-RAY EXAM OF KNEE 1 OR 2: CPT

## 2021-01-21 PROCEDURE — 85027 COMPLETE CBC AUTOMATED: CPT

## 2021-01-21 PROCEDURE — 97110 THERAPEUTIC EXERCISES: CPT

## 2021-01-21 PROCEDURE — 99232 SBSQ HOSP IP/OBS MODERATE 35: CPT

## 2021-01-21 PROCEDURE — C1713: CPT

## 2021-01-21 PROCEDURE — C1776: CPT

## 2021-01-21 PROCEDURE — 86900 BLOOD TYPING SEROLOGIC ABO: CPT

## 2021-01-21 PROCEDURE — 80048 BASIC METABOLIC PNL TOTAL CA: CPT

## 2021-01-21 PROCEDURE — 82962 GLUCOSE BLOOD TEST: CPT

## 2021-01-21 PROCEDURE — 97163 PT EVAL HIGH COMPLEX 45 MIN: CPT

## 2021-01-21 PROCEDURE — 36415 COLL VENOUS BLD VENIPUNCTURE: CPT

## 2021-01-21 RX ORDER — CELECOXIB 200 MG/1
1 CAPSULE ORAL
Qty: 42 | Refills: 0
Start: 2021-01-21 | End: 2021-02-10

## 2021-01-21 RX ORDER — SENNOSIDES/DOCUSATE SODIUM 8.6MG-50MG
2 TABLET ORAL
Qty: 14 | Refills: 0
Start: 2021-01-21 | End: 2021-01-27

## 2021-01-21 RX ORDER — IBUPROFEN 200 MG
0 TABLET ORAL
Qty: 0 | Refills: 0 | DISCHARGE

## 2021-01-21 RX ORDER — ASPIRIN/CALCIUM CARB/MAGNESIUM 324 MG
1 TABLET ORAL
Qty: 84 | Refills: 0
Start: 2021-01-21 | End: 2021-03-03

## 2021-01-21 RX ORDER — CEPHALEXIN 500 MG
500 CAPSULE ORAL
Refills: 0 | Status: DISCONTINUED | OUTPATIENT
Start: 2021-01-21 | End: 2021-01-21

## 2021-01-21 RX ORDER — OMEPRAZOLE 10 MG/1
1 CAPSULE, DELAYED RELEASE ORAL
Qty: 42 | Refills: 0
Start: 2021-01-21 | End: 2021-03-03

## 2021-01-21 RX ADMIN — Medication 500 MILLIGRAM(S): at 08:15

## 2021-01-21 RX ADMIN — PANTOPRAZOLE SODIUM 40 MILLIGRAM(S): 20 TABLET, DELAYED RELEASE ORAL at 06:05

## 2021-01-21 RX ADMIN — CELECOXIB 200 MILLIGRAM(S): 200 CAPSULE ORAL at 06:05

## 2021-01-21 RX ADMIN — OXYCODONE AND ACETAMINOPHEN 2 TABLET(S): 5; 325 TABLET ORAL at 02:03

## 2021-01-21 RX ADMIN — OXYCODONE AND ACETAMINOPHEN 2 TABLET(S): 5; 325 TABLET ORAL at 06:05

## 2021-01-21 RX ADMIN — GABAPENTIN 400 MILLIGRAM(S): 400 CAPSULE ORAL at 06:05

## 2021-01-21 RX ADMIN — Medication 325 MILLIGRAM(S): at 06:05

## 2021-01-21 NOTE — PROGRESS NOTE ADULT - ASSESSMENT
64 y/o female with PMH of anxiety, smoker, TIA, PFO s/p closure, HPL with chronic left knee pain with failed conservative measures limiting his daily activities presented for an elective left knee replacement She is now s/p Left knee arthroplasty.    # Left knee OA-s/p Left TKR   Pain control   Bowel regimen   Incentive spirometry  DVT px - per ortho   PT    # Tobacco abuse  - nicotine patch - not interested in quitting   # hx TIA, PFO closure - not on ASA   # Anxiety - takes xanax 2mg qid  seroquel 25 QID   # PreDM  - A1c 5.7, ADA diet, Lifestyle modifications, outpatient follow up with primary doctor.        
66 y/o female with PMH of anxiety, smoker, TIA, PFO s/p closure, HPL with chronic left knee pain with failed conservative measures limiting his daily activities presented for an elective left knee replacement She is now s/p Left knee arthroplasty.    # Left knee OA-s/p Left TKR   Pain control   Bowel regimen   Incentive spirometry  DVT px - per ortho   PT    # Tobacco abuse  - nicotine patch - not interested in quitting   # hx TIA, PFO closure - not on ASA   # Anxiety - takes xanax 2mg qid  seroquel 25 QID   medication list was confirmed with [facility] pharmacy     will follow

## 2021-01-21 NOTE — PROGRESS NOTE ADULT - SUBJECTIVE AND OBJECTIVE BOX
Orthopedic PA Postop Note  Patient S/P LEFT TKA  Patient in bed comfortable   LEFT Leg  Dressing C/D/I - ACE wrap without staining  DP Pulse intact   Calf Soft NT  Dorsi/Plantar Flexion/EHL/FHL intact   Sensation intact to light touch    Vital Signs Last 24 Hrs  T(C): 36.2 (19 Jan 2021 17:30), Max: 36.6 (19 Jan 2021 07:52)  T(F): 97.1 (19 Jan 2021 17:30), Max: 97.9 (19 Jan 2021 07:52)  HR: 86 (19 Jan 2021 17:30) (55 - 86)  BP: 132/90 (19 Jan 2021 17:30) (93/58 - 151/85)  BP(mean): 88 (19 Jan 2021 16:00) (65 - 101)  RR: 28 (19 Jan 2021 17:30) (14 - 28)  SpO2: 97% (19 Jan 2021 17:30) (95% - 100%)    < from: Xray Knee 1 or 2 Views, Left (01.19.21 @ 12:10) >     EXAM:  KNEE-LEFT                          PROCEDURE DATE:  01/19/2021          INTERPRETATION:  History:  Knee replacement    Technique: frontal and lateral  view of the left knee    Comparison: None    Findings: The patient is status post total knee replacement with associated postsurgical changes. There is no evidence of hardware dysfunction.    Impression: Left knee replacement.            PETE MENDOZA MD; Attending Interventional Radiologist  This document has been electronically signed. Jan 19 2021  2:24PM    < end of copied text >      A/P:  S/P LEFT TKA  1. DVTP - ASA  2. Physical Therapy   3. Pain Control as clinically indicated 
TRISHA LOVE  [id number]    History: 66y Female is status post left total knee arthroplasty on POD #1. Patient is doing well and is comfortable. The patient's pain is controlled using the prescribed pain medications. The patient is participating in physical therapy. Denies nausea, vomiting, chest pain, shortness of breath, abdominal pain or fever. No new complaints.                          11.3   12.59 )-----------( 171      ( 20 Jan 2021 07:08 )             34.4             MEDICATIONS  (STANDING):  aspirin enteric coated 325 milliGRAM(s) Oral two times a day  celecoxib 200 milliGRAM(s) Oral every 12 hours  gabapentin 400 milliGRAM(s) Oral two times a day  ketorolac   Injectable 15 milliGRAM(s) IV Push every 6 hours  pantoprazole    Tablet 40 milliGRAM(s) Oral before breakfast  senna 2 Tablet(s) Oral at bedtime  simvastatin 20 milliGRAM(s) Oral at bedtime  simvastatin 40 milliGRAM(s) Oral at bedtime  sodium chloride 0.9%. 1000 milliLiter(s) (100 mL/Hr) IV Continuous <Continuous>    MEDICATIONS  (PRN):  ALPRAZolam 0.5 milliGRAM(s) Oral three times a day PRN anxiety  aluminum hydroxide/magnesium hydroxide/simethicone Suspension 30 milliLiter(s) Oral four times a day PRN Indigestion  HYDROmorphone   Tablet 4 milliGRAM(s) Oral every 3 hours PRN Severe Pain (7 - 10)  HYDROmorphone  Injectable 0.5 milliGRAM(s) IV Push every 3 hours PRN breakthrough  magnesium hydroxide Suspension 30 milliLiter(s) Oral daily PRN Constipation  ondansetron Injectable 4 milliGRAM(s) IV Push every 6 hours PRN Nausea and/or Vomiting  oxycodone    5 mG/acetaminophen 325 mG 1 Tablet(s) Oral every 3 hours PRN Mild Pain (1 - 3)  oxycodone    5 mG/acetaminophen 325 mG 2 Tablet(s) Oral every 4 hours PRN Moderate Pain (4 - 6)  QUEtiapine 25 milliGRAM(s) Oral at bedtime PRN insomnia      Physical exam: The left knee dressing is clean, dry and intact. No drainage or discharge. No erythema is noted. No blistering. No ecchymosis. The calf is supple nontender. Passive range of motion is acceptable to due postoperative pain. Sensation to light touch is grossly intact distally. Motor function distally is 5/5. Extensor hallucis longus and flexor hallucis longus are intact. No foot drop. 2+ dorsalis pedis pulse. Capillary refill is less than 2 seconds. No cyanosis.    Primary Orthopedic Assessment:  •	s/p LEFT total knee replacement    Secondary  Orthopedic Assessment(s):   •	    Secondary  Medical Assessment(s):   •	    Plan:   •	DVT prophylaxis as prescribed, including use of compression devices and ankle pumps  •	Continue physical therapy  •	Weightbearing as tolerated of the left lower extremity with assistance of a walker  •	Incentive spirometry encouraged  •	Pain control as clinically indicated  •	Discharge planning – anticipated discharge is Home  
TRISHA LOVE  [id number]    History: 66y Female is status post left total knee arthroplasty on 1/19, POD #2. Patient is doing well and is comfortable. The patient's pain is controlled using the prescribed pain medications. The patient is participating in physical therapy. Denies nausea, vomiting, chest pain, shortness of breath, abdominal pain or fever. No new complaints.                          11.3   12.59 )-----------( 171      ( 20 Jan 2021 07:08 )             34.4     01-20    138  |  108<H>  |  11.0  ----------------------------<  179<H>  4.2   |  22.0  |  0.62    Ca    8.6      20 Jan 2021 07:08            Physical exam: The left knee ACE dressing is clean, dry and intact. No drainage or discharge. No erythema is noted. No blistering. No ecchymosis. The calf is supple nontender. Sensation to light touch is grossly intact distally. + plantar/dorsi/EHL/FHL. No foot drop. 2+ dorsalis pedis pulse. Cap refill < 2 seconds. No cyanosis.        Primary Orthopedic Assessment:  • s/p LEFT total knee replacement, POD#2        Plan:   • DVT prophylaxis as prescribed, including use of compression devices and ankle pumps  • Continue physical therapy  • Weightbearing as tolerated of the left lower extremity with assistance of a walker  • Incentive spirometry encouraged  • Pain control as clinically indicated  • Discharge planning – anticipated discharge is Home today when cleared by PT and medicine  
CC: Left TKR (20 Jan 2021 11:12)    HPI:  64 y/o female with PMH of anxiety, smoker, TIA, PFO s/p closure, HPL with chronic left knee pain with failed conservative measures limiting his daily activities presented for an elective left knee replacement She is now s/p Left knee arthroplasty POD#2.     INTERVAL HPI/OVERNIGHT EVENTS: Patient seen and examined lying in bed.  Pain controlled with current regimen.  Tolerating PT.  Patient denies any headache, dizziness, SOB, CP, abdominal pain, nausea, vomiting, dysuria.  Other ROS reviewed and are negative.    Vital Signs Last 24 Hrs  T(C): 36.5 (21 Jan 2021 08:05), Max: 36.8 (20 Jan 2021 15:31)  T(F): 97.7 (21 Jan 2021 08:05), Max: 98.3 (20 Jan 2021 15:31)  HR: 69 (21 Jan 2021 08:05) (69 - 81)  BP: 121/79 (21 Jan 2021 08:05) (112/65 - 126/72)  BP(mean): --  RR: 18 (21 Jan 2021 08:05) (18 - 18)  SpO2: 92% (21 Jan 2021 08:05) (92% - 95%)  I&O's Detail    20 Jan 2021 07:01  -  21 Jan 2021 07:00  --------------------------------------------------------  IN:    Oral Fluid: 600 mL  Total IN: 600 mL    OUT:  Total OUT: 0 mL    Total NET: 600 mL        PHYSICAL EXAM:  GENERAL: NAD, well-groomed, well-developed  HEAD:  Atraumatic, Normocephalic  NECK: Supple, No JVD, Normal thyroid  NERVOUS SYSTEM:  Alert & Oriented X3, Good concentration; Motor Strength 5/5 B/L upper and lower extremities  CHEST/LUNG: Clear to auscultation bilaterally; No rales, rhonchi, wheezing, or rubs  HEART: Regular rate and rhythm; No murmurs, rubs, or gallops  ABDOMEN: Soft, Nontender, Nondistended; Bowel sounds present  EXTREMITIES:  2+ Peripheral Pulses, No clubbing, cyanosis, or edema; Left knee with clean dressing                              10.4   6.87  )-----------( 116      ( 21 Jan 2021 07:55 )             32.5     21 Jan 2021 07:55    141    |  109    |  17.0   ----------------------------<  96     4.2     |  23.0   |  0.73     Ca    8.9        21 Jan 2021 07:55        MEDICATIONS  (STANDING):  aspirin enteric coated 325 milliGRAM(s) Oral two times a day  celecoxib 200 milliGRAM(s) Oral every 12 hours  cephalexin 500 milliGRAM(s) Oral four times a day  gabapentin 400 milliGRAM(s) Oral two times a day  pantoprazole    Tablet 40 milliGRAM(s) Oral before breakfast  senna 2 Tablet(s) Oral at bedtime  simvastatin 20 milliGRAM(s) Oral at bedtime  simvastatin 40 milliGRAM(s) Oral at bedtime  sodium chloride 0.9%. 1000 milliLiter(s) (100 mL/Hr) IV Continuous <Continuous>    MEDICATIONS  (PRN):  ALPRAZolam 0.5 milliGRAM(s) Oral three times a day PRN anxiety  aluminum hydroxide/magnesium hydroxide/simethicone Suspension 30 milliLiter(s) Oral four times a day PRN Indigestion  bisacodyl Suppository 10 milliGRAM(s) Rectal once PRN Constipation  HYDROmorphone   Tablet 4 milliGRAM(s) Oral every 3 hours PRN Severe Pain (7 - 10)  HYDROmorphone  Injectable 0.5 milliGRAM(s) IV Push every 3 hours PRN breakthrough  magnesium hydroxide Suspension 30 milliLiter(s) Oral daily PRN Constipation  ondansetron Injectable 4 milliGRAM(s) IV Push every 6 hours PRN Nausea and/or Vomiting  oxycodone    5 mG/acetaminophen 325 mG 1 Tablet(s) Oral every 3 hours PRN Mild Pain (1 - 3)  oxycodone    5 mG/acetaminophen 325 mG 2 Tablet(s) Oral every 4 hours PRN Moderate Pain (4 - 6)  QUEtiapine 25 milliGRAM(s) Oral at bedtime PRN insomnia      RADIOLOGY & ADDITIONAL TESTS:  < from: Xray Knee 1 or 2 Views, Left (01.19.21 @ 12:10) >   EXAM:  KNEE-LEFT                          PROCEDURE DATE:  01/19/2021          INTERPRETATION:  History:  Knee replacement    Technique: frontal and lateral  view of the left knee    Comparison: None    Findings: The patient is status post total knee replacement with associated postsurgical changes. There is no evidence of hardware dysfunction.    Impression: Left knee replacement.            PETE MENDOZA MD; Attending Interventional Radiologist  This document has been electronically signed. Jan 19 2021  2:24PM    < end of copied text >  
TRISHA LOVE    [id number]    66y      Female    CC: Left knee pain s/p Left TKR pOD#1  Doing well, pain is not very well controlled , feels like her foot is still numb.   ambulated with PT,tolerating po, urinating without any issues. denies any light headedness/dizziness on ambulation      INTERVAL HPI/OVERNIGHT EVENTS: no acute events     REVIEW OF SYSTEMS:    CONSTITUTIONAL: No fever, fatigue  RESPIRATORY: No cough, wheezing,No shortness of breath  CARDIOVASCULAR: No chest pain, palpitations  GASTROINTESTINAL: No abdominal or epigastric pain. No nausea, vomiting      Vital Signs Last 24 Hrs  T(C): 36.3 (20 Jan 2021 07:10), Max: 36.9 (19 Jan 2021 19:02)  T(F): 97.3 (20 Jan 2021 07:10), Max: 98.4 (19 Jan 2021 19:02)  HR: 68 (20 Jan 2021 07:10) (55 - 86)  BP: 135/75 (20 Jan 2021 07:10) (93/58 - 151/85)  BP(mean): 88 (19 Jan 2021 16:00) (65 - 101)  RR: 18 (20 Jan 2021 07:10) (14 - 28)  SpO2: 95% (20 Jan 2021 07:10) (92% - 100%)    PHYSICAL EXAM:    GENERAL: NAD, well-groomed  HEENT: PERRL, +EOMI  NECK: soft, Supple  CHEST/LUNG: Clear to percussion bilaterally; No wheezing  HEART: S1S2+, Regular rate and rhythm; No murmurs  EXTREMITIES: No clubbing, cyanosis, or edema  SKIN: No rashes or lesions  NEURO: AAOX3    01-19 @ 07:01  -  01-20 @ 07:00  --------------------------------------------------------  IN: 3375 mL / OUT: 800 mL / NET: 2575 mL        LABS:                        11.3   12.59 )-----------( 171      ( 20 Jan 2021 07:08 )             34.4     01-20    138  |  108<H>  |  11.0  ----------------------------<  179<H>  4.2   |  22.0  |  0.62    Ca    8.6      20 Jan 2021 07:08              MEDICATIONS  (STANDING):  aspirin enteric coated 325 milliGRAM(s) Oral two times a day  celecoxib 200 milliGRAM(s) Oral every 12 hours  gabapentin 400 milliGRAM(s) Oral two times a day  ketorolac   Injectable 15 milliGRAM(s) IV Push every 6 hours  pantoprazole    Tablet 40 milliGRAM(s) Oral before breakfast  senna 2 Tablet(s) Oral at bedtime  simvastatin 20 milliGRAM(s) Oral at bedtime  simvastatin 40 milliGRAM(s) Oral at bedtime  sodium chloride 0.9%. 1000 milliLiter(s) (100 mL/Hr) IV Continuous <Continuous>    MEDICATIONS  (PRN):  ALPRAZolam 0.5 milliGRAM(s) Oral three times a day PRN anxiety  aluminum hydroxide/magnesium hydroxide/simethicone Suspension 30 milliLiter(s) Oral four times a day PRN Indigestion  HYDROmorphone   Tablet 4 milliGRAM(s) Oral every 3 hours PRN Severe Pain (7 - 10)  HYDROmorphone  Injectable 0.5 milliGRAM(s) IV Push every 3 hours PRN breakthrough  magnesium hydroxide Suspension 30 milliLiter(s) Oral daily PRN Constipation  ondansetron Injectable 4 milliGRAM(s) IV Push every 6 hours PRN Nausea and/or Vomiting  oxycodone    5 mG/acetaminophen 325 mG 1 Tablet(s) Oral every 3 hours PRN Mild Pain (1 - 3)  oxycodone    5 mG/acetaminophen 325 mG 2 Tablet(s) Oral every 4 hours PRN Moderate Pain (4 - 6)  QUEtiapine 25 milliGRAM(s) Oral at bedtime PRN insomnia      RADIOLOGY & ADDITIONAL TESTS:

## 2021-01-21 NOTE — DISCHARGE NOTE NURSING/CASE MANAGEMENT/SOCIAL WORK - PATIENT PORTAL LINK FT
You can access the FollowMyHealth Patient Portal offered by [facility] by registering at the following website: [email or web address]. By joining [employer]’s FollowMyHealth portal, you will also be able to view your health information using other applications (apps) compatible with our system.

## 2021-01-21 NOTE — PROGRESS NOTE ADULT - ATTENDING COMMENTS
pt seen and examined at bedside  65yr old female s/p Lt knee replacement   Doing well. offers no complaints   RS: CTAB CVS - S1, S2 normal   Labs & vitals  - reviewed and Within acceptable range   Agree with above   Medically stable for discharge. ct home medications. Takes Seroquel 25mg/50mg PRN.

## 2021-02-08 ENCOUNTER — APPOINTMENT (OUTPATIENT)
Dept: ORTHOPEDIC SURGERY | Facility: CLINIC | Age: 66
End: 2021-02-08
Payer: MEDICARE

## 2021-02-08 VITALS — TEMPERATURE: 98 F

## 2021-02-08 DIAGNOSIS — Z47.1 AFTERCARE FOLLOWING JOINT REPLACEMENT SURGERY: ICD-10-CM

## 2021-02-08 PROCEDURE — 99024 POSTOP FOLLOW-UP VISIT: CPT

## 2021-02-08 PROCEDURE — 73562 X-RAY EXAM OF KNEE 3: CPT | Mod: LT

## 2021-02-08 NOTE — HISTORY OF PRESENT ILLNESS
[Procedure: ___] : status post [unfilled] [Clean/Dry/Intact] : clean, dry and intact [Healed] : healed [Swelling] : swollen [Neuro Intact] : an unremarkable neurological exam [Vascular Intact] : ~T peripheral vascular exam normal [Negative Nishant's] : maneuvers demonstrated a negative Nishant's sign [Xray (Date:___)] : [unfilled] Xray -  [Doing Well] : is doing well [No Sign of Infection] : is showing no signs of infection [Adequate Pain Control] : has adequate pain control [5] : the patient reports pain that is 5/10 in severity [Chills] : no chills [Constipation] : no constipation [Diarrhea] : no diarrhea [Dysuria] : no dysuria [Fever] : no fever [Nausea] : no nausea [Vomiting] : no vomiting [Erythema] : not erythematous [Discharge] : absent of discharge [de-identified] : s/p left TKR 1/19/21 [Dehiscence] : not dehisced [de-identified] : Pt is 3 weeks post-op. The patient is experiencing moderate pain. Pt states that "the pain makes me want to cry." She denies any drainage. Pt had bronchitis and was unable to do PT. She notes that Oxycodone does not provide relief. She was then prescribed 325/5 mg of Percocet which did not provide significant relief, so she was then prescribed 325/10 mg of Percocet which has been providing relief\par She ambulates with a walker.  [de-identified] : Left knee exam shows healed incision with no sign of infection. Range of motion 0-85 degrees [de-identified] : 3V xray of the left knee done in the office today and reviewed by Dr. German Guerrero demonstrates s/p implants in good positioning with no evidence of wear, loosening, or subsidence.  [de-identified] : Pt should continue taking Aspirin BID for DVTP. We prescribed outpatient PT and more 325/10 mg ofPercocet. Pt understands the importance of prophylaxis for invasive dental procedures. F/u with us in 3 weeks.

## 2021-02-08 NOTE — END OF VISIT
[FreeTextEntry3] : I, Devan Farah, acted solely as a scribe for Dr. German Guerrero on this date 02/08/2021.

## 2021-02-17 NOTE — ASU PREOP CHECKLIST - SELECT TESTS ORDERED
Mid-Level Procedure Text (A): After obtaining clear surgical margins the patient was sent to a mid-level provider for surgical repair.  The patient understands they will receive post-surgical care and follow-up from the mid-level provider. Type and Screen/POCT Blood Glucose

## 2021-03-08 ENCOUNTER — APPOINTMENT (OUTPATIENT)
Dept: ORTHOPEDIC SURGERY | Facility: CLINIC | Age: 66
End: 2021-03-08
Payer: MEDICARE

## 2021-03-08 PROCEDURE — 99024 POSTOP FOLLOW-UP VISIT: CPT

## 2021-03-08 PROCEDURE — 20610 DRAIN/INJ JOINT/BURSA W/O US: CPT | Mod: 58,LT

## 2021-03-08 PROCEDURE — 73562 X-RAY EXAM OF KNEE 3: CPT | Mod: LT

## 2021-03-08 NOTE — HISTORY OF PRESENT ILLNESS
[Slow Progress] : is progressing slowly [No Sign of Infection] : is showing no signs of infection [Adequate Pain Control] : has adequate pain control [Chills] : no chills [Constipation] : no constipation [Diarrhea] : no diarrhea [Dysuria] : no dysuria [Fever] : no fever [Nausea] : no nausea [Vomiting] : no vomiting [de-identified] : left TKR 1/19/2021  [de-identified] : pt is 7 weeks from left TKA\par after the sx, pt' brother had Covid 19 infection which delayed her PT\par \par Pt's PCR on 2/12/21 was negative \par she is in out pt rehab 2 times per week \par \par  [de-identified] : Left knee exam shows healed incision with no sign of infection. Range of motion 0-90 degrees. The surgical incision site(s) swollen, but clean, dry and intact, healed, not erythematous, absent of discharge and not dehisced. Additional findings included an unremarkable neurological exam, peripheral vascular exam normal and maneuvers demonstrated a negative Nishant's sign. \par she has palpable  bakers cyst vs hematoma posterior knee\par  [de-identified] : 3V xray of the left knee done in the office today and reviewed by Dr. German Guerrero demonstrates s/p implants in good positioning with no evidence of wear, loosening, or subsidence. \par \par  [de-identified] : pt underwent aspiration of left knee baker's cyst\par pt may continue PT\par return to office in 1M for f/u\par

## 2021-03-08 NOTE — PROCEDURE
[de-identified] : pt underwent aspiration of left knee baker's cyst\par I removed 40CC of bloody fluid. no medication was injected

## 2021-04-07 RX ORDER — OXYCODONE AND ACETAMINOPHEN 10; 325 MG/1; MG/1
10-325 TABLET ORAL
Qty: 10 | Refills: 0 | Status: ACTIVE | COMMUNITY
Start: 2021-02-25 | End: 1900-01-01

## 2021-04-12 ENCOUNTER — APPOINTMENT (OUTPATIENT)
Dept: ORTHOPEDIC SURGERY | Facility: CLINIC | Age: 66
End: 2021-04-12

## 2021-04-17 ENCOUNTER — EMERGENCY (EMERGENCY)
Facility: HOSPITAL | Age: 66
LOS: 1 days | Discharge: HOME | End: 2021-04-17
Attending: STUDENT IN AN ORGANIZED HEALTH CARE EDUCATION/TRAINING PROGRAM
Payer: MEDICARE

## 2021-04-17 VITALS
DIASTOLIC BLOOD PRESSURE: 65 MMHG | OXYGEN SATURATION: 98 % | SYSTOLIC BLOOD PRESSURE: 145 MMHG | HEART RATE: 78 BPM | RESPIRATION RATE: 18 BRPM

## 2021-04-17 VITALS
DIASTOLIC BLOOD PRESSURE: 77 MMHG | TEMPERATURE: 98 F | SYSTOLIC BLOOD PRESSURE: 135 MMHG | HEART RATE: 72 BPM | WEIGHT: 138.01 LBS | OXYGEN SATURATION: 99 % | HEIGHT: 64 IN | RESPIRATION RATE: 18 BRPM

## 2021-04-17 DIAGNOSIS — Z96.659 PRESENCE OF UNSPECIFIED ARTIFICIAL KNEE JOINT: Chronic | ICD-10-CM

## 2021-04-17 DIAGNOSIS — Z98.89 OTHER SPECIFIED POSTPROCEDURAL STATES: Chronic | ICD-10-CM

## 2021-04-17 DIAGNOSIS — L05.91 PILONIDAL CYST WITHOUT ABSCESS: Chronic | ICD-10-CM

## 2021-04-17 PROCEDURE — 73502 X-RAY EXAM HIP UNI 2-3 VIEWS: CPT

## 2021-04-17 PROCEDURE — 27250 TREAT HIP DISLOCATION: CPT

## 2021-04-17 PROCEDURE — 73502 X-RAY EXAM HIP UNI 2-3 VIEWS: CPT | Mod: 26,LT,76

## 2021-04-17 PROCEDURE — 99284 EMERGENCY DEPT VISIT MOD MDM: CPT | Mod: 25

## 2021-04-17 PROCEDURE — 99285 EMERGENCY DEPT VISIT HI MDM: CPT | Mod: 25

## 2021-04-17 PROCEDURE — 99284 EMERGENCY DEPT VISIT MOD MDM: CPT | Mod: 25,GC

## 2021-04-17 PROCEDURE — 96375 TX/PRO/DX INJ NEW DRUG ADDON: CPT

## 2021-04-17 PROCEDURE — 96374 THER/PROPH/DIAG INJ IV PUSH: CPT

## 2021-04-17 RX ORDER — PROPOFOL 10 MG/ML
40 INJECTION, EMULSION INTRAVENOUS ONCE
Refills: 0 | Status: COMPLETED | OUTPATIENT
Start: 2021-04-17 | End: 2021-04-17

## 2021-04-17 RX ORDER — KETAMINE HYDROCHLORIDE 100 MG/ML
35 INJECTION INTRAMUSCULAR; INTRAVENOUS ONCE
Refills: 0 | Status: DISCONTINUED | OUTPATIENT
Start: 2021-04-17 | End: 2021-04-17

## 2021-04-17 RX ORDER — MORPHINE SULFATE 50 MG/1
4 CAPSULE, EXTENDED RELEASE ORAL ONCE
Refills: 0 | Status: DISCONTINUED | OUTPATIENT
Start: 2021-04-17 | End: 2021-04-17

## 2021-04-17 RX ADMIN — KETAMINE HYDROCHLORIDE 35 MILLIGRAM(S): 100 INJECTION INTRAMUSCULAR; INTRAVENOUS at 17:24

## 2021-04-17 RX ADMIN — PROPOFOL 40 MILLIGRAM(S): 10 INJECTION, EMULSION INTRAVENOUS at 17:25

## 2021-04-17 RX ADMIN — MORPHINE SULFATE 4 MILLIGRAM(S): 50 CAPSULE, EXTENDED RELEASE ORAL at 15:47

## 2021-04-17 NOTE — ED PROVIDER NOTE - PROGRESS NOTE DETAILS
Ralph: Dr. Ruiz and I reviewed XR. Patient's XR c/w left posterior hip dislocation. Ortho called. Patient now in critical for conscious sedation. Ralph: Patient's left hip reduced. Post reduction XRs ordered.

## 2021-04-17 NOTE — ED PROVIDER NOTE - NS ED ROS FT
Constitutional: no fever, no chills  Head: NC, AT   Eyes: no redness   ENMT: no nasal congestion/drainage, no sore throat   CV: no chest pain, no edema  Resp: no cough, no dyspnea  MSK: +left hip pain   Skin: no lesions, no rashes   Neuro: no LOC, no headache, no sensory deficits, no weakness

## 2021-04-17 NOTE — ED PROVIDER NOTE - NSFOLLOWUPINSTRUCTIONS_ED_ALL_ED_FT
Wear knee immobilizer at all times  It is okay to put weight on your left leg, but wear your knee immobilizer  Follow up with Dr. Guerrero this week

## 2021-04-17 NOTE — ED PROVIDER NOTE - CARE PROVIDER_API CALL
German Guerrero)  Orthopaedic Surgery  [address] [facility] [facility] [address], [address]  [place] [address]  Phone: [phone]  Fax: [phone]  Follow Up Time:

## 2021-04-17 NOTE — ED PROVIDER NOTE - ATTENDING CONTRIBUTION TO CARE
I performed a face to face history and physical exam of the patient and discussed their management with the student/resident/ACP. I reviewed the student/resident/ACP's note and agree with the documented findings and plan of care.    Pt states that she was bending over in her yard and felt a pop in her L hip and now has severe pain. Pt with hip replacement by Dr. Swanson in october 2020.    physical - rrr. ctab. abd - soft, nt. no edema. no rash. L hip flexed and internally rotated. NVI distally.    plan - XR showed posterior hip dislocation. ortho successfully reduced hip under moderate sedation.  Pt given posterior precautions and discharged with knee immobilizer and instructed to keep in place until she sees Dr. swanson this week. instructed to return for worsening pain or any other concerns.

## 2021-04-17 NOTE — ED PROVIDER NOTE - CLINICAL SUMMARY MEDICAL DECISION MAKING FREE TEXT BOX
66 year old with history of TIA, L hip replacement (2020, Dr. Guerrero), HLD, current smoker, and left knee OA who presents with hip pain/injury. Exam and XR c/w left posterior hip dislocation. Ortho consulted and patient underwent conscious sedation for reduction. Post reduction films reviewed. 66 year old with history of TIA, L hip replacement (2020, Dr. Guerrero), HLD, current smoker, and left knee OA who presents with hip pain/injury. Exam and XR c/w left posterior hip dislocation. Ortho consulted and patient underwent conscious sedation for reduction. Post reduction films reviewed. Patient will be discharged home with ortho f/u.
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
## 2021-04-17 NOTE — ED PROVIDER NOTE - PATIENT PORTAL LINK FT
You can access the FollowMyHealth Patient Portal offered by [facility] by registering at the following website: [email or web address]. By joining [employer]’s FollowMyHealth portal, you will also be able to view your health information using other applications (apps) compatible with our system.

## 2021-04-17 NOTE — ED PROVIDER NOTE - PSH
H/O total knee replacement  2015- Right knee  Pilonidal cyst    S/P dilatation and curettage  x2  S/P knee surgery  right x5

## 2021-04-17 NOTE — ED PROVIDER NOTE - PHYSICAL EXAMINATION
Gen: Well appearing in NAD  Head: NC/AT  Neck: trachea midline  Resp:  No distress  Ext: LLE in adduction and internal rotation   Neuro:  A&O appears non focal  Skin:  Warm and dry as visualized  Psych: Calm, cooperative

## 2021-04-17 NOTE — ED ADULT TRIAGE NOTE - CHIEF COMPLAINT QUOTE
pt states she was cleaning, bent over, and felt a sudden onset pain to left hip. pt L leg internally rotated, pt unable to walk. moves toes, pedal pulse present. had L hip replacement october 2020

## 2021-04-17 NOTE — ED PROCEDURE NOTE - NS_POSTPROCCAREGUIDE_ED_ALL_ED
Patient is now fully awake, with vital signs and temperature stable, hydration is adequate, patients Magnolia’s  score is at baseline (or greater than 8), patient and escort has received  discharge education.

## 2021-04-17 NOTE — ED PROVIDER NOTE - OBJECTIVE STATEMENT
66 year old with history of TIA, L hip replacement (2020), HLD, current smoker, and left knee OA who presents with hip pain/injury. While cleaning today the patient bent over and then had a sudden onset of pain in her left hip. Her pain has been so severe she has been unable to walk. At bedside, patient holding LLE in adduction with internal rotation. 66 year old with history of TIA, L hip replacement (2020, Dr. Guerrero), HLD, current smoker, and left knee OA who presents with hip pain/injury. While cleaning today the patient bent over and then had a sudden onset of pain in her left hip. Her pain has been so severe she has been unable to walk. At bedside, patient holding LLE in adduction with internal rotation.
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
## 2021-04-19 ENCOUNTER — FORM ENCOUNTER (OUTPATIENT)
Age: 66
End: 2021-04-19

## 2021-04-20 RX ORDER — OXYCODONE AND ACETAMINOPHEN 10; 325 MG/1; MG/1
10-325 TABLET ORAL
Qty: 20 | Refills: 0 | Status: ACTIVE | COMMUNITY
Start: 2020-10-06 | End: 1900-01-01

## 2021-06-14 ENCOUNTER — RESULT REVIEW (OUTPATIENT)
Age: 66
End: 2021-06-14

## 2021-06-15 ENCOUNTER — APPOINTMENT (OUTPATIENT)
Dept: DERMATOLOGY | Facility: CLINIC | Age: 66
End: 2021-06-15
Payer: MEDICARE

## 2021-06-15 PROCEDURE — 17003 DESTRUCT PREMALG LES 2-14: CPT

## 2021-06-15 PROCEDURE — 99213 OFFICE O/P EST LOW 20 MIN: CPT | Mod: 25

## 2021-06-15 PROCEDURE — 17000 DESTRUCT PREMALG LESION: CPT | Mod: 59

## 2021-06-15 PROCEDURE — 11102 TANGNTL BX SKIN SINGLE LES: CPT

## 2021-07-18 ENCOUNTER — EMERGENCY (EMERGENCY)
Facility: HOSPITAL | Age: 66
LOS: 1 days | Discharge: HOME | End: 2021-07-18
Attending: EMERGENCY MEDICINE
Payer: MEDICARE

## 2021-07-18 VITALS
HEART RATE: 76 BPM | OXYGEN SATURATION: 97 % | SYSTOLIC BLOOD PRESSURE: 178 MMHG | RESPIRATION RATE: 19 BRPM | DIASTOLIC BLOOD PRESSURE: 95 MMHG

## 2021-07-18 VITALS
HEIGHT: 64 IN | WEIGHT: 160.06 LBS | SYSTOLIC BLOOD PRESSURE: 147 MMHG | RESPIRATION RATE: 16 BRPM | OXYGEN SATURATION: 96 % | TEMPERATURE: 98 F | DIASTOLIC BLOOD PRESSURE: 86 MMHG | HEART RATE: 67 BPM

## 2021-07-18 DIAGNOSIS — Z98.89 OTHER SPECIFIED POSTPROCEDURAL STATES: Chronic | ICD-10-CM

## 2021-07-18 DIAGNOSIS — Z96.659 PRESENCE OF UNSPECIFIED ARTIFICIAL KNEE JOINT: Chronic | ICD-10-CM

## 2021-07-18 DIAGNOSIS — L05.91 PILONIDAL CYST WITHOUT ABSCESS: Chronic | ICD-10-CM

## 2021-07-18 PROCEDURE — 96376 TX/PRO/DX INJ SAME DRUG ADON: CPT

## 2021-07-18 PROCEDURE — 73502 X-RAY EXAM HIP UNI 2-3 VIEWS: CPT | Mod: 26,LT

## 2021-07-18 PROCEDURE — 99152 MOD SED SAME PHYS/QHP 5/>YRS: CPT

## 2021-07-18 PROCEDURE — 99284 EMERGENCY DEPT VISIT MOD MDM: CPT | Mod: 25

## 2021-07-18 PROCEDURE — 73502 X-RAY EXAM HIP UNI 2-3 VIEWS: CPT

## 2021-07-18 PROCEDURE — 96375 TX/PRO/DX INJ NEW DRUG ADDON: CPT

## 2021-07-18 PROCEDURE — 96374 THER/PROPH/DIAG INJ IV PUSH: CPT | Mod: XU

## 2021-07-18 PROCEDURE — 73501 X-RAY EXAM HIP UNI 1 VIEW: CPT

## 2021-07-18 PROCEDURE — 99156 MOD SED OTH PHYS/QHP 5/>YRS: CPT

## 2021-07-18 PROCEDURE — 99282 EMERGENCY DEPT VISIT SF MDM: CPT

## 2021-07-18 PROCEDURE — 73501 X-RAY EXAM HIP UNI 1 VIEW: CPT | Mod: 26,LT,59

## 2021-07-18 PROCEDURE — 27265 TREAT HIP DISLOCATION: CPT | Mod: LT

## 2021-07-18 PROCEDURE — 99285 EMERGENCY DEPT VISIT HI MDM: CPT | Mod: 25

## 2021-07-18 RX ORDER — FENTANYL CITRATE 50 UG/ML
100 INJECTION INTRAVENOUS ONCE
Refills: 0 | Status: DISCONTINUED | OUTPATIENT
Start: 2021-07-18 | End: 2021-07-18

## 2021-07-18 RX ORDER — PROPOFOL 10 MG/ML
25 INJECTION, EMULSION INTRAVENOUS ONCE
Refills: 0 | Status: COMPLETED | OUTPATIENT
Start: 2021-07-18 | End: 2021-07-18

## 2021-07-18 RX ORDER — MORPHINE SULFATE 50 MG/1
4 CAPSULE, EXTENDED RELEASE ORAL ONCE
Refills: 0 | Status: DISCONTINUED | OUTPATIENT
Start: 2021-07-18 | End: 2021-07-18

## 2021-07-18 RX ORDER — PROPOFOL 10 MG/ML
100 INJECTION, EMULSION INTRAVENOUS ONCE
Refills: 0 | Status: COMPLETED | OUTPATIENT
Start: 2021-07-18 | End: 2021-07-18

## 2021-07-18 RX ORDER — KETAMINE HYDROCHLORIDE 100 MG/ML
50 INJECTION INTRAMUSCULAR; INTRAVENOUS ONCE
Refills: 0 | Status: DISCONTINUED | OUTPATIENT
Start: 2021-07-18 | End: 2021-07-18

## 2021-07-18 RX ORDER — KETAMINE HYDROCHLORIDE 100 MG/ML
25 INJECTION INTRAMUSCULAR; INTRAVENOUS ONCE
Refills: 0 | Status: DISCONTINUED | OUTPATIENT
Start: 2021-07-18 | End: 2021-07-18

## 2021-07-18 RX ADMIN — PROPOFOL 100 MILLIGRAM(S): 10 INJECTION, EMULSION INTRAVENOUS at 19:43

## 2021-07-18 RX ADMIN — FENTANYL CITRATE 100 MICROGRAM(S): 50 INJECTION INTRAVENOUS at 19:57

## 2021-07-18 RX ADMIN — PROPOFOL 25 MILLIGRAM(S): 10 INJECTION, EMULSION INTRAVENOUS at 19:43

## 2021-07-18 RX ADMIN — FENTANYL CITRATE 100 MICROGRAM(S): 50 INJECTION INTRAVENOUS at 17:31

## 2021-07-18 RX ADMIN — KETAMINE HYDROCHLORIDE 25 MILLIGRAM(S): 100 INJECTION INTRAMUSCULAR; INTRAVENOUS at 19:32

## 2021-07-18 RX ADMIN — FENTANYL CITRATE 100 MICROGRAM(S): 50 INJECTION INTRAVENOUS at 19:36

## 2021-07-18 RX ADMIN — KETAMINE HYDROCHLORIDE 50 MILLIGRAM(S): 100 INJECTION INTRAMUSCULAR; INTRAVENOUS at 19:32

## 2021-07-18 NOTE — ED ADULT TRIAGE NOTE - CHIEF COMPLAINT QUOTE
bending down to pick something up when I felt my left hip pop out, shortened and internally rotated. IV lock in place with 100mcg fentanyl via EMS

## 2021-07-18 NOTE — ED PROVIDER NOTE - PROGRESS NOTE DETAILS
procedure sedation performed, total of ketamin 75 mg IV, propofol 125mg, and fentyl 100 mg IV given. patient had transient hypoxia that resolved. hip reduced by ortho, repeat xray showed reduction. ortho report wanting xray of left knee. patient declined. will follow up outpatient.

## 2021-07-18 NOTE — ED PROVIDER NOTE - CLINICAL SUMMARY MEDICAL DECISION MAKING FREE TEXT BOX
65 yo F hx of left hip replacement p/w left hip dislocation. procedural sedation performed. reduction performed by ortho. knee brace applied by ortho. follow up with

## 2021-07-18 NOTE — ED PROVIDER NOTE - PATIENT PORTAL LINK FT
You can access the FollowMyHealth Patient Portal offered by [facility] by registering at the following website: [email or web address]. By joining [employer]’s FollowMyHealth portal, you will also be able to view your health information using other applications (apps) compatible with our system.

## 2021-07-18 NOTE — ED PROVIDER NOTE - OBJECTIVE STATEMENT
65 yo F hx of left hip replacement (nov 2020, Dr. Guerrero) and left knee replacement p/w left hip dislocation when she bend down to  a potato on the floor. Patient report prior hip dislocation in April. patient report that morphine doesn't work for her. patient request fentyl. last oral intake was round 2 pm with "a bite of hot dog and a sip of water". no complication with anesthesia.

## 2021-07-18 NOTE — ED PROVIDER NOTE - CARE PROVIDER_API CALL
German Guerrero)  Orthopaedic Surgery  [address]  [place] [address]  Phone: [phone]  Fax: [phone]  Established Patient  Follow Up Time: 4-6 Days
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
## 2021-07-18 NOTE — ED PROVIDER NOTE - NS ED ROS FT
Review of Systems  •	CONSTITUTIONAL - no  fever, no diaphoresis, no weight change  •	SKIN - no rash  •	HEMATOLOGIC - no bleeding, no bruising  •	EYES - no eye pain, no blurred vision  •	ENT - no change in hearing, no pain  •	RESPIRATORY - no shortness of breath, no cough  •	CARDIAC - no chest pain, no palpitations  •	GI - no abd pain, no nausea, no vomiting, no diarrhea, no constipation, no bleeding  •	GENITO-URINARY - no discharge, no dysuria; no hematuria,   •	ENDO - no polydipsia, no polyuria, no heat/no cold intolerance  •	MUSCULOSKELETAL - (+) left hip pain, no swelling, no redness  •	NEUROLOGIC - no weakness, no headache, no anesthesia, no paresthesias  •	PSYCH - no anxiety, non suicidal, non homicidal, no hallucination, no depression

## 2021-07-18 NOTE — ED PROVIDER NOTE - PHYSICAL EXAMINATION
VITAL SIGNS: I have reviewed nursing notes and confirm.  CONSTITUTIONAL:  in no acute distress.  SKIN: Skin exam is warm and dry, no acute rash.  HEAD: Normocephalic; atraumatic.  EYES: PERRL, EOM intact; conjunctiva and sclera clear.  ENT: No nasal discharge; airway clear. Throat clear.  NECK: Supple; non tender.    CARD: Regular rate and rhythm.  RESP: No wheezes,  no rales or rhonchi.   ABD:  soft; non-distended; non-tender;   EXT: (+) deformity of left hip, internally rotated and shorted. 2+ peripheral pulse.  NEURO: Alert, oriented. Grossly unremarkable.

## 2021-07-18 NOTE — ED ADULT NURSE NOTE - OBJECTIVE STATEMENT
66y female PMHX of hip replacement, c/o left hip displacement after bending down to  a potato. obvious deformity noted to left leg, rotated inward. respirations even and unlabored. denies chest discomfort. abd soft and nondistended. skin WNL for race.

## 2021-07-20 ENCOUNTER — APPOINTMENT (OUTPATIENT)
Dept: ORTHOPEDIC SURGERY | Facility: CLINIC | Age: 66
End: 2021-07-20

## 2021-07-20 NOTE — HISTORY OF PRESENT ILLNESS
[de-identified] : 07/20/2021: Patient is a 66 year-old female who presents to the office today for evaluation of left hip and knee pain.

## 2021-07-21 ENCOUNTER — APPOINTMENT (OUTPATIENT)
Dept: ORTHOPEDIC SURGERY | Facility: CLINIC | Age: 66
End: 2021-07-21
Payer: MEDICARE

## 2021-07-21 VITALS
HEIGHT: 62 IN | SYSTOLIC BLOOD PRESSURE: 121 MMHG | DIASTOLIC BLOOD PRESSURE: 83 MMHG | WEIGHT: 150 LBS | BODY MASS INDEX: 27.6 KG/M2 | HEART RATE: 85 BPM

## 2021-07-21 PROCEDURE — 99214 OFFICE O/P EST MOD 30 MIN: CPT

## 2021-07-21 PROCEDURE — 73502 X-RAY EXAM HIP UNI 2-3 VIEWS: CPT | Mod: LT

## 2021-07-21 RX ORDER — OXYCODONE HYDROCHLORIDE 5 MG/1
1 TABLET ORAL
Qty: 0 | Refills: 0 | DISCHARGE
Start: 2021-07-21

## 2021-07-21 RX ORDER — OXYCODONE AND ACETAMINOPHEN 5; 325 MG/1; MG/1
5-325 TABLET ORAL DAILY
Qty: 10 | Refills: 0 | Status: ACTIVE | COMMUNITY
Start: 2021-07-21 | End: 1900-01-01

## 2021-07-21 NOTE — DISCUSSION/SUMMARY
[de-identified] : TRISHA LOVE is a 66 year F here for presents with recurrent left hip dislocation after surgery.   This was a posterior dislocation.  I did review hip precautions with the patient.  He seemed to have some confusion about hip precautions so we did review them with her today.  I discussed the role for revision surgery.  She wants to pursue revision total hip arthroplasty.  I did discuss with her that there is no surgery that will absolutely reduce the risk of dislocation to 0.  I do think we can probably help her with an elevated liner.  We will need options for full revision if necessary including the possibility of dual mobility.\par  the patient is a 66 year old individual with end stage arthritis of their left hip joint. Based upon the patient's continued symptoms and failure to respond to conservative treatment I have recommended a staged left hip arthroplasty for this patient. A long discussion took place with the patient describing what a total joint replacement is and what the procedure would entail. A total knee arthroplasty model, similar to the implant that will be used during the operation, was utilized to demonstrate and to discuss the various bearing surfaces of the implants. The hospitilization and post-operative care and rehabilitation were also discussed. The use of perioperative antibiotics and DVT prophylaxis were discussed. The risk, benefits and alternatives to a surgical intervention were discussed at length with the patient. The patient was also advised of risks related to the medical comorbidities, elevated body mass index (BMI), and smoking where applicable. We discussed how to reduce modifiable risk factors and encouraged smoking cessation were applicable. A lengthy discussion took place to review the most common complications including but not limited to: deep vein thrombosis, pulmonary embolus, heart attack, stroke, infection, wound breakdown, numbness, damage to nerves, tendon, muscles, arteries or other blood vessels, death and other possible complications from anesthesia. The patient was told that we will take steps to minimize these risks by using sterile technique, antibiotics and DVT prophylaxis when appropriate and follow the patient postoperatively in the office setting to monitor progress. The possibility of recurrent pain, no improvement in pain and actual worsening of pain were also discussed with the patient.\par

## 2021-07-21 NOTE — HISTORY OF PRESENT ILLNESS
[Pain Location] : pain [3] : a current pain level of 3/10 [Walking] : worsened by walking [de-identified] : Precious is a 65 y/o F s/p left RITIKA on 10/1/2020 and dislocation while picking up baked potatoes on the floor on 7/18/2021\par   she went to [facility] ED , hip was put back in, placed on knee immobilized . she used for 2 days\par she states this is 2nd dislocation \par 1st dislocation in 4/2021, she was bending to  flip flop\par s/p left TKR 1/19/21 .she c/o left knee is bothering her too. \par \par she does not have any pain medication. \par she is taking ibuprogen

## 2021-07-21 NOTE — REASON FOR VISIT
[Follow-Up Visit] : a follow-up visit for [Other: ____] : [unfilled] [FreeTextEntry2] : S/P left TKR, DOS: 1/19/2021. S/P Right TKR, DOS: March 3, 2015.

## 2021-07-21 NOTE — PHYSICAL EXAM
[de-identified] : GENERAL APPEARANCE: Well nourished and hydrated, pleasant, alert, and oriented x 3. Appears their stated age.\par HEENT: Normocephalic, extraocular eye motion intact. Nasal septum midline. Oral cavity clear. External auditory canal clear.\par RESPIRATORY: Breath sounds clear and audible in all lobes. No wheezing, No accessory muscle use.\par CARDIOVASCULAR: No apparent abnormalities. No lower leg edema. No varicosities. Pedal pulses are palpable.\par NEUROLOGIC: Sensation is normal, no muscle weakness in the upper or lower extremities.\par DERMATOLOGIC: No apparent skin lesions, moist, warm, no rash.\par SPINE: Cervical spine appears normal and moves freely; thoracic spine appears normal and moves freely; lumbosacral spine appears normal and moves freely, normal, nontender.\par MUSCULOSKELETAL: Hands, wrists, and elbows are normal and move freely, shoulders are normal and move freely.\par \par  [de-identified] : Left hip exam shows healed incision , Pain free ROM, no signs of infection\par Left knee exam shows full ROM 0-130.\par \par  [de-identified] : 5V xray of the bilateral hips done in office today and reviewed by Dr. German Guerrero demonstrates implants good position, no signs of wear, loosening, subsidence.\par

## 2021-07-21 NOTE — ADDENDUM
[FreeTextEntry1] : I, Chong Maradiaga, acted solely as a scribe for Dr. German Guerrero on this date 07/21/2021\par

## 2021-07-27 NOTE — PHYSICAL THERAPY INITIAL EVALUATION ADULT - ASR EQUIP NEEDS DISCH PT EVAL
[facility] Provider Note    Patient : Sussy Pineda Age: 31 year old Sex: female   MRN: [id number] Encounter Date: 2021  PCP: No Pcp      Chief Complaint   Patient presents with   • Office Visit   • Cough   • Congestion        HPI     Patient complains of runny nose, postnasal drip, scratchy throat and cough for the last 2 days.  No fevers or chills.  No headache or neck pain.  No body aches or fatigue.  No change in taste or smell.  With harsh cough she feels a soreness in her anterior chest.  No chest pain without coughing.  No change in exercise tolerance.  No shortness of breath.  No other complaints or concerns.  Eating and drinking normally.  No Covid vaccine.    Histories   Past Medical History:  Past Medical History:   Diagnosis Date   • No known problems      There are no problems to display for this patient.       Past Surgical History:  Past Surgical History:   Procedure Laterality Date   •  SECTION, CLASSIC         Family History:  No family history on file.    Social History:  Social History     Tobacco Use   • Smoking status: Never Smoker   • Smokeless tobacco: Never Used   Substance Use Topics   • Alcohol use: Yes     Comment: socially    • Drug use: Never       Allergies & Medications   Allergies:  ALLERGIES:  Patient has no known allergies.    Medications:  Current Medications    No medications on file       Patient's medications, allergies, past medical, surgical, and social history  were reviewed and updated as appropriate.  Nursing notes reviewed.    Review of Systems   Constitutional: Negative.    HENT: Positive for postnasal drip and rhinorrhea.    Eyes: Negative.    Respiratory: Positive for cough. Negative for shortness of breath.    Cardiovascular: Negative.    Gastrointestinal: Negative.    Genitourinary: Negative.    Musculoskeletal: Negative.    Skin: Negative.    Neurological: Negative.    Hematological: Negative.    Psychiatric/Behavioral:  Negative.    All other systems reviewed and are negative.      Objective     Vitals:    07/27/21 1100   BP: 117/83   Pulse: 84   Resp: 14   Temp: 99.2 °F (37.3 °C)   SpO2: 99%   Weight: 81.2 kg (179 lb)   Height: 5' 2\" (1.575 m)   PainSc: 7    LMP: 07/10/2021           Physical Exam  Vitals and nursing note reviewed.   Constitutional:       Appearance: Normal appearance.   HENT:      Head: Normocephalic and atraumatic.      Right Ear: Tympanic membrane normal.      Left Ear: Tympanic membrane normal.      Nose: Rhinorrhea present.      Mouth/Throat:      Mouth: Mucous membranes are moist.      Pharynx: Oropharynx is clear.   Eyes:      Extraocular Movements: Extraocular movements intact.      Conjunctiva/sclera: Conjunctivae normal.      Pupils: Pupils are equal, round, and reactive to light.   Cardiovascular:      Rate and Rhythm: Normal rate and regular rhythm.      Pulses: Normal pulses.      Heart sounds: Normal heart sounds.   Pulmonary:      Effort: Pulmonary effort is normal.      Breath sounds: Normal breath sounds.   Abdominal:      General: Abdomen is flat. Bowel sounds are normal.      Palpations: Abdomen is soft.   Musculoskeletal:         General: Normal range of motion.      Cervical back: Normal range of motion and neck supple.   Skin:     General: Skin is warm.      Capillary Refill: Capillary refill takes less than 2 seconds.   Neurological:      General: No focal deficit present.      Mental Status: She is alert and oriented to person, place, and time. Mental status is at baseline.   Psychiatric:         Mood and Affect: Mood normal.         Behavior: Behavior normal.         Thought Content: Thought content normal.         Judgment: Judgment normal.         Assessment/Plan     Assessment   Problem List Items Addressed This Visit     None      Visit Diagnoses     Cough    -  Primary    Relevant Orders    COVID DIAGNOSTIC TEST (Completed)          Today's lab results:  Results for orders placed or  performed in visit on 07/27/21   COVID DIAGNOSTIC TEST    Specimen: Nasal; Swab   Result Value Ref Range    POCT SARS-COV-2 ANTIGEN Not Detected Not Detected        Today's imagine results:  No orders to display         This is a 31 year old female who presents with the above history and physical. Patient is well appearing, non-toxic, and in no acute distress. Vital signs are within normal limits.  Pulse oximetry is 99% on room air which is normal.  Her work of breathing is normal.  Tolerating PO intake and appears well hydrated.  Her lungs are clear.  Rapid Covid testing was negative.  Close home observation advised.  She recently started a new job as a .  She was given a note to be home for the next 2 days.  Follow-up with primary care doctor advised.  Red flag warning signs discussed.  Patient understands and agrees with the plan.  All questions answered prior to discharge.        Discussed that the [facility] is not an exhaustive resource and should not take the place of primary care visits. Discussed following up with PCP in 2-3 days or sooner if symptoms worsen.   Instructions provided as documented in the AVS.    Electronically signed by: Queenie Sow MD  7/27/2021           Anticipate no additional medical equipment needs; pt has all recommended equipment

## 2021-08-16 ENCOUNTER — OUTPATIENT (OUTPATIENT)
Dept: OUTPATIENT SERVICES | Facility: HOSPITAL | Age: 66
LOS: 1 days | End: 2021-08-16
Payer: MEDICARE

## 2021-08-16 VITALS
HEART RATE: 78 BPM | HEIGHT: 64 IN | SYSTOLIC BLOOD PRESSURE: 130 MMHG | DIASTOLIC BLOOD PRESSURE: 70 MMHG | WEIGHT: 149.25 LBS | TEMPERATURE: 98 F | RESPIRATION RATE: 20 BRPM

## 2021-08-16 DIAGNOSIS — Z01.818 ENCOUNTER FOR OTHER PREPROCEDURAL EXAMINATION: ICD-10-CM

## 2021-08-16 DIAGNOSIS — Z96.642 PRESENCE OF LEFT ARTIFICIAL HIP JOINT: Chronic | ICD-10-CM

## 2021-08-16 DIAGNOSIS — Z98.89 OTHER SPECIFIED POSTPROCEDURAL STATES: Chronic | ICD-10-CM

## 2021-08-16 DIAGNOSIS — T84.098A OTHER MECHANICAL COMPLICATION OF OTHER INTERNAL JOINT PROSTHESIS, INITIAL ENCOUNTER: ICD-10-CM

## 2021-08-16 DIAGNOSIS — Z29.9 ENCOUNTER FOR PROPHYLACTIC MEASURES, UNSPECIFIED: ICD-10-CM

## 2021-08-16 DIAGNOSIS — L05.91 PILONIDAL CYST WITHOUT ABSCESS: Chronic | ICD-10-CM

## 2021-08-16 DIAGNOSIS — Z96.659 PRESENCE OF UNSPECIFIED ARTIFICIAL KNEE JOINT: Chronic | ICD-10-CM

## 2021-08-16 LAB
A1C WITH ESTIMATED AVERAGE GLUCOSE RESULT: 5.3 % — SIGNIFICANT CHANGE UP (ref 4–5.6)
ANION GAP SERPL CALC-SCNC: 10 MMOL/L — SIGNIFICANT CHANGE UP (ref 5–17)
APTT BLD: 44.9 SEC — HIGH (ref 27.5–35.5)
BASOPHILS # BLD AUTO: 0.05 K/UL — SIGNIFICANT CHANGE UP (ref 0–0.2)
BASOPHILS NFR BLD AUTO: 0.7 % — SIGNIFICANT CHANGE UP (ref 0–2)
BLD GP AB SCN SERPL QL: SIGNIFICANT CHANGE UP
BUN SERPL-MCNC: 16.5 MG/DL — SIGNIFICANT CHANGE UP (ref 8–20)
CALCIUM SERPL-MCNC: 9.5 MG/DL — SIGNIFICANT CHANGE UP (ref 8.6–10.2)
CHLORIDE SERPL-SCNC: 100 MMOL/L — SIGNIFICANT CHANGE UP (ref 98–107)
CO2 SERPL-SCNC: 26 MMOL/L — SIGNIFICANT CHANGE UP (ref 22–29)
CREAT SERPL-MCNC: 0.66 MG/DL — SIGNIFICANT CHANGE UP (ref 0.5–1.3)
EOSINOPHIL # BLD AUTO: 0.11 K/UL — SIGNIFICANT CHANGE UP (ref 0–0.5)
EOSINOPHIL NFR BLD AUTO: 1.6 % — SIGNIFICANT CHANGE UP (ref 0–6)
ESTIMATED AVERAGE GLUCOSE: 105 MG/DL — SIGNIFICANT CHANGE UP (ref 68–114)
GLUCOSE SERPL-MCNC: 83 MG/DL — SIGNIFICANT CHANGE UP (ref 70–99)
HCT VFR BLD CALC: 42.1 % — SIGNIFICANT CHANGE UP (ref 34.5–45)
HGB BLD-MCNC: 13.4 G/DL — SIGNIFICANT CHANGE UP (ref 11.5–15.5)
IMM GRANULOCYTES NFR BLD AUTO: 0.3 % — SIGNIFICANT CHANGE UP (ref 0–1.5)
INR BLD: 1.04 RATIO — SIGNIFICANT CHANGE UP (ref 0.88–1.16)
LYMPHOCYTES # BLD AUTO: 2.16 K/UL — SIGNIFICANT CHANGE UP (ref 1–3.3)
LYMPHOCYTES # BLD AUTO: 32.2 % — SIGNIFICANT CHANGE UP (ref 13–44)
MCHC RBC-ENTMCNC: 30.7 PG — SIGNIFICANT CHANGE UP (ref 27–34)
MCHC RBC-ENTMCNC: 31.8 GM/DL — LOW (ref 32–36)
MCV RBC AUTO: 96.6 FL — SIGNIFICANT CHANGE UP (ref 80–100)
MONOCYTES # BLD AUTO: 0.42 K/UL — SIGNIFICANT CHANGE UP (ref 0–0.9)
MONOCYTES NFR BLD AUTO: 6.3 % — SIGNIFICANT CHANGE UP (ref 2–14)
MRSA PCR RESULT.: SIGNIFICANT CHANGE UP
NEUTROPHILS # BLD AUTO: 3.95 K/UL — SIGNIFICANT CHANGE UP (ref 1.8–7.4)
NEUTROPHILS NFR BLD AUTO: 58.9 % — SIGNIFICANT CHANGE UP (ref 43–77)
PLATELET # BLD AUTO: 174 K/UL — SIGNIFICANT CHANGE UP (ref 150–400)
POTASSIUM SERPL-MCNC: 4.6 MMOL/L — SIGNIFICANT CHANGE UP (ref 3.5–5.3)
POTASSIUM SERPL-SCNC: 4.6 MMOL/L — SIGNIFICANT CHANGE UP (ref 3.5–5.3)
PROTHROM AB SERPL-ACNC: 12 SEC — SIGNIFICANT CHANGE UP (ref 10.6–13.6)
RBC # BLD: 4.36 M/UL — SIGNIFICANT CHANGE UP (ref 3.8–5.2)
RBC # FLD: 13.2 % — SIGNIFICANT CHANGE UP (ref 10.3–14.5)
S AUREUS DNA NOSE QL NAA+PROBE: SIGNIFICANT CHANGE UP
SODIUM SERPL-SCNC: 136 MMOL/L — SIGNIFICANT CHANGE UP (ref 135–145)
WBC # BLD: 6.71 K/UL — SIGNIFICANT CHANGE UP (ref 3.8–10.5)
WBC # FLD AUTO: 6.71 K/UL — SIGNIFICANT CHANGE UP (ref 3.8–10.5)

## 2021-08-16 PROCEDURE — 93010 ELECTROCARDIOGRAM REPORT: CPT

## 2021-08-16 PROCEDURE — G0463: CPT

## 2021-08-16 PROCEDURE — 93005 ELECTROCARDIOGRAM TRACING: CPT

## 2021-08-16 NOTE — H&P PST ADULT - HISTORY OF PRESENT ILLNESS
66 year old female present today for pst. She report having her left hip replaced due to OA of the left hip. She state doing will for many years. She endorse two episodes when her left his dislocated. One time when she was cleaning the bathroom and another time when she bent over to  a item.  She was evaluated in the ER both times and had a closed reduction under anesthesia.  She is now schedule for revision left hip total joint replacement on 8/24 with Dr. Guerrero

## 2021-08-16 NOTE — H&P PST ADULT - NS MD HP INPLANTS MED DEV
May have metals or titanium in left hip and right knee May have metals or titanium in left hip and right knee/Artificial joint
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
## 2021-08-16 NOTE — H&P PST ADULT - ASSESSMENT
OPIOID RISK TOOL    MIYA EACH BOX THAT APPLIES AND ADD TOTALS AT THE END    FAMILY HISTORY OF SUBSTANCE ABUSE                 FEMALE         MALE                                                Alcohol                             [  ]1 pt          [  ]3pts                                               Illegal Drugs                     [  ]2 pts        [  ]3pts                                               Rx Drugs                           [  ]4 pts        [  ]4 pts    PERSONAL HISTORY OF SUBSTANCE ABUSE                                                                                          Alcohol                             [  ]3 pts       [  ]3 pts                                               Illegal Drugs                     [  ]4 pts        [  ]4 pts                                               Rx Drugs                           [  ]5 pts        [  ]5 pts    AGE BETWEEN 16-45 YEARS                                      [  ]1 pt         [  ]1 pt    HISTORY OF PREADOLESCENT   SEXUAL ABUSE                                                             [  ]3 pts        [  ]0pts    PSYCHOLOGICAL DISEASE                     ADD, OCD, Bipolar, Schizophrenia        [  ]2 pts         [  ]2 pts                      Depression                                               [  ]1 pt           [  ]1 pt           SCORING TOTAL   (add numbers and type here)              (0)                                     A score of 3 or lower indicated LOW risk for future opioid abuse  A score of 4 to 7 indicated moderate risk for future opioid abuse  A score of 8 or higher indicates a high risk for opioid abuse    CAPRINI SCORE [CLOT]    AGE RELATED RISK FACTORS                                                       MOBILITY RELATED FACTORS  [ ] Age 41-60 years                                            (1 Point)                  [ ] Bed rest                                                        (1 Point)  [ x] Age: 61-74 years                                           (2 Points)                 [ ] Plaster cast                                                   (2 Points)  [ ] Age= 75 years                                              (3 Points)                 [ ] Bed bound for more than 72 hours                 (2 Points)    DISEASE RELATED RISK FACTORS                                               GENDER SPECIFIC FACTORS  [ ] Edema in the lower extremities                       (1 Point)                  [ ] Pregnancy                                                     (1 Point)  [ ] Varicose veins                                               (1 Point)                  [ ] Post-partum < 6 weeks                                   (1 Point)             [ x] BMI > 25 Kg/m2                                            (1 Point)                  [ ] Hormonal therapy  or oral contraception          (1 Point)                 [ ] Sepsis (in the previous month)                        (1 Point)                  [ ] History of pregnancy complications                 (1 point)  [ ] Pneumonia or serious lung disease                                               [ ] Unexplained or recurrent                     (1 Point)           (in the previous month)                               (1 Point)  [ ] Abnormal pulmonary function test                     (1 Point)                 SURGERY RELATED RISK FACTORS  [ ] Acute myocardial infarction                              (1 Point)                 [ ]  Section                                             (1 Point)  [ ] Congestive heart failure (in the previous month)  (1 Point)               [ ] Minor surgery                                                  (1 Point)   [ ] Inflammatory bowel disease                             (1 Point)                 [ ] Arthroscopic surgery                                        (2 Points)  [ ] Central venous access                                      (2 Points)                [ x] General surgery lasting more than 45 minutes   (2 Points)       [ ] Stroke (in the previous month)                          (5 Points)               [ ] Elective arthroplasty                                         (5 Points)                                                                                                                                               HEMATOLOGY RELATED FACTORS                                                 TRAUMA RELATED RISK FACTORS  [ ] Prior episodes of VTE                                     (3 Points)                [ ] Fracture of the hip, pelvis, or leg                       (5 Points)  [ ] Positive family history for VTE                         (3 Points)                 [ ] Acute spinal cord injury (in the previous month)  (5 Points)  [ ] Prothrombin 51588 A                                     (3 Points)                 [ ] Paralysis  (less than 1 month)                             (5 Points)  [ ] Factor V Leiden                                             (3 Points)                  [ ] Multiple Trauma within 1 month                        (5 Points)  [ ] Lupus anticoagulants                                     (3 Points)                                                           [ ] Anticardiolipin antibodies                               (3 Points)                                                       [ ] High homocysteine in the blood                      (3 Points)                                             [ ] Other congenital or acquired thrombophilia      (3 Points)                                                [ ] Heparin induced thrombocytopenia                  (3 Points)                                          Total Score [   5       ]    Caprini Score 0 - 2:  Low Risk, No VTE Prophylaxis required for most patients, encourage ambulation  Caprini Score 3 - 6:  At Risk, pharmacologic VTE prophylaxis is indicated for most patients (in the absence of a contraindication)  Caprini Score Greater than or = 7:  High Risk, pharmacologic VTE prophylaxis is indicated for most patients (in the absence of a contraindication)          66 year old female with left hip replaced due to OA of the left hip. Now present with issue related to her hip prosthetic. She is now schedule for revision left hip total joint replacement on  with Dr. Guerrero     Patient educated on written and verbal preop instructions.   medications reviewed, instructions given on what medications to take and what not to take.  Pt instructed to stop Herbals or anti-inflammatory meds one week prior to surgery and discuss with PMD.

## 2021-08-16 NOTE — H&P PST ADULT - NSICDXPASTSURGICALHX_GEN_ALL_CORE_FT
PAST SURGICAL HISTORY:  H/O total knee replacement 2015- Right knee, left knee    Pilonidal cyst     S/P dilatation and curettage x2    S/P hip replacement, left     S/P knee surgery right x5 meniscus

## 2021-08-16 NOTE — H&P PST ADULT - PROBLEM SELECTOR PLAN 1
Medical and cardiac clearance  ((Nadine [phone], Cardiologist Dr. Todd [phone])   revision left hip total joint replacement on 8/24 with Dr. Guerrero

## 2021-08-16 NOTE — H&P PST ADULT - PROBLEM SELECTOR PLAN 2
High risk,  Surgical team should assess /Strongly recommend pharmacological and mechanical measures for VTE prophylaxis

## 2021-08-18 RX ORDER — CEFAZOLIN SODIUM 1 G
2000 VIAL (EA) INJECTION ONCE
Refills: 0 | Status: COMPLETED | OUTPATIENT
Start: 2021-08-24 | End: 2021-08-24

## 2021-08-18 RX ORDER — SODIUM CHLORIDE 9 MG/ML
3 INJECTION INTRAMUSCULAR; INTRAVENOUS; SUBCUTANEOUS EVERY 8 HOURS
Refills: 0 | Status: DISCONTINUED | OUTPATIENT
Start: 2021-08-24 | End: 2021-08-24

## 2021-08-21 ENCOUNTER — LABORATORY RESULT (OUTPATIENT)
Age: 66
End: 2021-08-21

## 2021-08-21 ENCOUNTER — APPOINTMENT (OUTPATIENT)
Dept: DISASTER EMERGENCY | Facility: CLINIC | Age: 66
End: 2021-08-21

## 2021-08-23 ENCOUNTER — TRANSCRIPTION ENCOUNTER (OUTPATIENT)
Age: 66
End: 2021-08-23

## 2021-08-23 RX ORDER — TRANEXAMIC ACID 100 MG/ML
1000 INJECTION, SOLUTION INTRAVENOUS ONCE
Refills: 0 | Status: DISCONTINUED | OUTPATIENT
Start: 2021-08-24 | End: 2021-08-24

## 2021-08-23 RX ORDER — CEFAZOLIN SODIUM 1 G
2000 VIAL (EA) INJECTION ONCE
Refills: 0 | Status: DISCONTINUED | OUTPATIENT
Start: 2021-08-24 | End: 2021-08-24

## 2021-08-24 ENCOUNTER — APPOINTMENT (OUTPATIENT)
Dept: ORTHOPEDIC SURGERY | Facility: HOSPITAL | Age: 66
End: 2021-08-24

## 2021-08-24 ENCOUNTER — TRANSCRIPTION ENCOUNTER (OUTPATIENT)
Age: 66
End: 2021-08-24

## 2021-08-24 ENCOUNTER — INPATIENT (INPATIENT)
Facility: HOSPITAL | Age: 66
LOS: 1 days | Discharge: HOME | DRG: 467 | End: 2021-08-26
Attending: ORTHOPAEDIC SURGERY | Admitting: ORTHOPAEDIC SURGERY
Payer: MEDICARE

## 2021-08-24 ENCOUNTER — EMERGENCY (EMERGENCY)
Facility: HOSPITAL | Age: 66
LOS: 1 days | End: 2021-08-24
Attending: EMERGENCY MEDICINE
Payer: MEDICARE

## 2021-08-24 VITALS
OXYGEN SATURATION: 96 % | RESPIRATION RATE: 20 BRPM | SYSTOLIC BLOOD PRESSURE: 153 MMHG | DIASTOLIC BLOOD PRESSURE: 98 MMHG | TEMPERATURE: 98 F | HEART RATE: 69 BPM | HEIGHT: 64 IN | WEIGHT: 145.95 LBS

## 2021-08-24 VITALS
WEIGHT: 149.25 LBS | TEMPERATURE: 99 F | RESPIRATION RATE: 20 BRPM | HEIGHT: 64 IN | HEART RATE: 65 BPM | SYSTOLIC BLOOD PRESSURE: 147 MMHG | OXYGEN SATURATION: 99 % | DIASTOLIC BLOOD PRESSURE: 73 MMHG

## 2021-08-24 DIAGNOSIS — L05.91 PILONIDAL CYST WITHOUT ABSCESS: Chronic | ICD-10-CM

## 2021-08-24 DIAGNOSIS — Z96.642 PRESENCE OF LEFT ARTIFICIAL HIP JOINT: Chronic | ICD-10-CM

## 2021-08-24 DIAGNOSIS — Z98.89 OTHER SPECIFIED POSTPROCEDURAL STATES: Chronic | ICD-10-CM

## 2021-08-24 DIAGNOSIS — T84.091A OTHER MECHANICAL COMPLICATION OF INTERNAL LEFT HIP PROSTHESIS, INITIAL ENCOUNTER: ICD-10-CM

## 2021-08-24 DIAGNOSIS — Z96.659 PRESENCE OF UNSPECIFIED ARTIFICIAL KNEE JOINT: Chronic | ICD-10-CM

## 2021-08-24 PROBLEM — M19.90 UNSPECIFIED OSTEOARTHRITIS, UNSPECIFIED SITE: Chronic | Status: ACTIVE | Noted: 2021-08-16

## 2021-08-24 LAB
ALBUMIN SERPL ELPH-MCNC: 4.3 G/DL — SIGNIFICANT CHANGE UP (ref 3.3–5.2)
ALP SERPL-CCNC: 100 U/L — SIGNIFICANT CHANGE UP (ref 40–120)
ALT FLD-CCNC: 10 U/L — SIGNIFICANT CHANGE UP
ANION GAP SERPL CALC-SCNC: 10 MMOL/L — SIGNIFICANT CHANGE UP (ref 5–17)
APTT BLD: 45.9 SEC — HIGH (ref 27.5–35.5)
AST SERPL-CCNC: 19 U/L — SIGNIFICANT CHANGE UP
BASOPHILS # BLD AUTO: 0.02 K/UL — SIGNIFICANT CHANGE UP (ref 0–0.2)
BASOPHILS NFR BLD AUTO: 0.3 % — SIGNIFICANT CHANGE UP (ref 0–2)
BILIRUB SERPL-MCNC: 0.3 MG/DL — LOW (ref 0.4–2)
BUN SERPL-MCNC: 13.8 MG/DL — SIGNIFICANT CHANGE UP (ref 8–20)
CALCIUM SERPL-MCNC: 9.4 MG/DL — SIGNIFICANT CHANGE UP (ref 8.6–10.2)
CHLORIDE SERPL-SCNC: 102 MMOL/L — SIGNIFICANT CHANGE UP (ref 98–107)
CO2 SERPL-SCNC: 26 MMOL/L — SIGNIFICANT CHANGE UP (ref 22–29)
CREAT SERPL-MCNC: 0.73 MG/DL — SIGNIFICANT CHANGE UP (ref 0.5–1.3)
EOSINOPHIL # BLD AUTO: 0.04 K/UL — SIGNIFICANT CHANGE UP (ref 0–0.5)
EOSINOPHIL NFR BLD AUTO: 0.5 % — SIGNIFICANT CHANGE UP (ref 0–6)
GLUCOSE BLDC GLUCOMTR-MCNC: 107 MG/DL — HIGH (ref 70–99)
GLUCOSE BLDC GLUCOMTR-MCNC: 152 MG/DL — HIGH (ref 70–99)
GLUCOSE SERPL-MCNC: 107 MG/DL — HIGH (ref 70–99)
HCT VFR BLD CALC: 43.2 % — SIGNIFICANT CHANGE UP (ref 34.5–45)
HGB BLD-MCNC: 14.3 G/DL — SIGNIFICANT CHANGE UP (ref 11.5–15.5)
IMM GRANULOCYTES NFR BLD AUTO: 0.3 % — SIGNIFICANT CHANGE UP (ref 0–1.5)
INR BLD: 1 RATIO — SIGNIFICANT CHANGE UP (ref 0.88–1.16)
LYMPHOCYTES # BLD AUTO: 1.48 K/UL — SIGNIFICANT CHANGE UP (ref 1–3.3)
LYMPHOCYTES # BLD AUTO: 18.6 % — SIGNIFICANT CHANGE UP (ref 13–44)
MCHC RBC-ENTMCNC: 31.2 PG — SIGNIFICANT CHANGE UP (ref 27–34)
MCHC RBC-ENTMCNC: 33.1 GM/DL — SIGNIFICANT CHANGE UP (ref 32–36)
MCV RBC AUTO: 94.1 FL — SIGNIFICANT CHANGE UP (ref 80–100)
MONOCYTES # BLD AUTO: 0.43 K/UL — SIGNIFICANT CHANGE UP (ref 0–0.9)
MONOCYTES NFR BLD AUTO: 5.4 % — SIGNIFICANT CHANGE UP (ref 2–14)
NEUTROPHILS # BLD AUTO: 5.98 K/UL — SIGNIFICANT CHANGE UP (ref 1.8–7.4)
NEUTROPHILS NFR BLD AUTO: 74.9 % — SIGNIFICANT CHANGE UP (ref 43–77)
PLATELET # BLD AUTO: 175 K/UL — SIGNIFICANT CHANGE UP (ref 150–400)
POTASSIUM SERPL-MCNC: 4.7 MMOL/L — SIGNIFICANT CHANGE UP (ref 3.5–5.3)
POTASSIUM SERPL-SCNC: 4.7 MMOL/L — SIGNIFICANT CHANGE UP (ref 3.5–5.3)
PROT SERPL-MCNC: 7.4 G/DL — SIGNIFICANT CHANGE UP (ref 6.6–8.7)
PROTHROM AB SERPL-ACNC: 11.6 SEC — SIGNIFICANT CHANGE UP (ref 10.6–13.6)
RBC # BLD: 4.59 M/UL — SIGNIFICANT CHANGE UP (ref 3.8–5.2)
RBC # FLD: 12.7 % — SIGNIFICANT CHANGE UP (ref 10.3–14.5)
SARS-COV-2 RNA SPEC QL NAA+PROBE: SIGNIFICANT CHANGE UP
SODIUM SERPL-SCNC: 138 MMOL/L — SIGNIFICANT CHANGE UP (ref 135–145)
WBC # BLD: 7.97 K/UL — SIGNIFICANT CHANGE UP (ref 3.8–10.5)
WBC # FLD AUTO: 7.97 K/UL — SIGNIFICANT CHANGE UP (ref 3.8–10.5)

## 2021-08-24 PROCEDURE — 73502 X-RAY EXAM HIP UNI 2-3 VIEWS: CPT | Mod: 26,LT

## 2021-08-24 PROCEDURE — 99284 EMERGENCY DEPT VISIT MOD MDM: CPT | Mod: GC

## 2021-08-24 PROCEDURE — 27134 REVISE HIP JOINT REPLACEMENT: CPT | Mod: AS,52,LT

## 2021-08-24 PROCEDURE — 27134 REVISE HIP JOINT REPLACEMENT: CPT | Mod: 52,LT

## 2021-08-24 PROCEDURE — 73502 X-RAY EXAM HIP UNI 2-3 VIEWS: CPT | Mod: 26,LT,77

## 2021-08-24 RX ORDER — SODIUM CHLORIDE 9 MG/ML
1000 INJECTION INTRAMUSCULAR; INTRAVENOUS; SUBCUTANEOUS
Refills: 0 | Status: DISCONTINUED | OUTPATIENT
Start: 2021-08-24 | End: 2021-08-25

## 2021-08-24 RX ORDER — QUETIAPINE FUMARATE 200 MG/1
25 TABLET, FILM COATED ORAL AT BEDTIME
Refills: 0 | Status: DISCONTINUED | OUTPATIENT
Start: 2021-08-24 | End: 2021-08-26

## 2021-08-24 RX ORDER — SODIUM CHLORIDE 9 MG/ML
1000 INJECTION, SOLUTION INTRAVENOUS
Refills: 0 | Status: DISCONTINUED | OUTPATIENT
Start: 2021-08-24 | End: 2021-08-24

## 2021-08-24 RX ORDER — POLYETHYLENE GLYCOL 3350 17 G/17G
17 POWDER, FOR SOLUTION ORAL AT BEDTIME
Refills: 0 | Status: DISCONTINUED | OUTPATIENT
Start: 2021-08-24 | End: 2021-08-26

## 2021-08-24 RX ORDER — CELECOXIB 200 MG/1
200 CAPSULE ORAL EVERY 12 HOURS
Refills: 0 | Status: DISCONTINUED | OUTPATIENT
Start: 2021-08-26 | End: 2021-08-26

## 2021-08-24 RX ORDER — ASPIRIN/CALCIUM CARB/MAGNESIUM 324 MG
325 TABLET ORAL
Refills: 0 | Status: DISCONTINUED | OUTPATIENT
Start: 2021-08-24 | End: 2021-08-26

## 2021-08-24 RX ORDER — ONDANSETRON 8 MG/1
4 TABLET, FILM COATED ORAL ONCE
Refills: 0 | Status: DISCONTINUED | OUTPATIENT
Start: 2021-08-24 | End: 2021-08-24

## 2021-08-24 RX ORDER — PANTOPRAZOLE SODIUM 20 MG/1
40 TABLET, DELAYED RELEASE ORAL
Refills: 0 | Status: DISCONTINUED | OUTPATIENT
Start: 2021-08-24 | End: 2021-08-26

## 2021-08-24 RX ORDER — OXYCODONE HYDROCHLORIDE 5 MG/1
10 TABLET ORAL
Refills: 0 | Status: DISCONTINUED | OUTPATIENT
Start: 2021-08-24 | End: 2021-08-26

## 2021-08-24 RX ORDER — ONDANSETRON 8 MG/1
4 TABLET, FILM COATED ORAL EVERY 6 HOURS
Refills: 0 | Status: DISCONTINUED | OUTPATIENT
Start: 2021-08-24 | End: 2021-08-26

## 2021-08-24 RX ORDER — KETOROLAC TROMETHAMINE 30 MG/ML
15 SYRINGE (ML) INJECTION EVERY 6 HOURS
Refills: 0 | Status: DISCONTINUED | OUTPATIENT
Start: 2021-08-24 | End: 2021-08-25

## 2021-08-24 RX ORDER — DEXAMETHASONE 0.5 MG/5ML
8 ELIXIR ORAL ONCE
Refills: 0 | Status: DISCONTINUED | OUTPATIENT
Start: 2021-08-24 | End: 2021-08-24

## 2021-08-24 RX ORDER — SENNA PLUS 8.6 MG/1
2 TABLET ORAL AT BEDTIME
Refills: 0 | Status: DISCONTINUED | OUTPATIENT
Start: 2021-08-24 | End: 2021-08-26

## 2021-08-24 RX ORDER — FENTANYL CITRATE 50 UG/ML
25 INJECTION INTRAVENOUS
Refills: 0 | Status: DISCONTINUED | OUTPATIENT
Start: 2021-08-24 | End: 2021-08-24

## 2021-08-24 RX ORDER — ACETAMINOPHEN 500 MG
975 TABLET ORAL EVERY 8 HOURS
Refills: 0 | Status: DISCONTINUED | OUTPATIENT
Start: 2021-08-24 | End: 2021-08-26

## 2021-08-24 RX ORDER — CEFAZOLIN SODIUM 1 G
2000 VIAL (EA) INJECTION
Refills: 0 | Status: COMPLETED | OUTPATIENT
Start: 2021-08-24 | End: 2021-08-25

## 2021-08-24 RX ORDER — ALPRAZOLAM 0.25 MG
2 TABLET ORAL AT BEDTIME
Refills: 0 | Status: DISCONTINUED | OUTPATIENT
Start: 2021-08-24 | End: 2021-08-26

## 2021-08-24 RX ORDER — HYDROMORPHONE HYDROCHLORIDE 2 MG/ML
4 INJECTION INTRAMUSCULAR; INTRAVENOUS; SUBCUTANEOUS
Refills: 0 | Status: DISCONTINUED | OUTPATIENT
Start: 2021-08-24 | End: 2021-08-26

## 2021-08-24 RX ORDER — OXYCODONE HYDROCHLORIDE 5 MG/1
5 TABLET ORAL
Refills: 0 | Status: DISCONTINUED | OUTPATIENT
Start: 2021-08-24 | End: 2021-08-26

## 2021-08-24 RX ORDER — ACETAMINOPHEN 500 MG
975 TABLET ORAL ONCE
Refills: 0 | Status: COMPLETED | OUTPATIENT
Start: 2021-08-24 | End: 2021-08-24

## 2021-08-24 RX ORDER — MORPHINE SULFATE 50 MG/1
4 CAPSULE, EXTENDED RELEASE ORAL ONCE
Refills: 0 | Status: DISCONTINUED | OUTPATIENT
Start: 2021-08-24 | End: 2021-08-24

## 2021-08-24 RX ORDER — MAGNESIUM HYDROXIDE 400 MG/1
30 TABLET, CHEWABLE ORAL DAILY
Refills: 0 | Status: DISCONTINUED | OUTPATIENT
Start: 2021-08-24 | End: 2021-08-26

## 2021-08-24 RX ORDER — CELECOXIB 200 MG/1
400 CAPSULE ORAL ONCE
Refills: 0 | Status: COMPLETED | OUTPATIENT
Start: 2021-08-24 | End: 2021-08-24

## 2021-08-24 RX ORDER — FENTANYL CITRATE 50 UG/ML
50 INJECTION INTRAVENOUS ONCE
Refills: 0 | Status: DISCONTINUED | OUTPATIENT
Start: 2021-08-24 | End: 2021-08-24

## 2021-08-24 RX ORDER — SODIUM CHLORIDE 9 MG/ML
500 INJECTION INTRAMUSCULAR; INTRAVENOUS; SUBCUTANEOUS ONCE
Refills: 0 | Status: COMPLETED | OUTPATIENT
Start: 2021-08-24 | End: 2021-08-24

## 2021-08-24 RX ORDER — APREPITANT 80 MG/1
40 CAPSULE ORAL ONCE
Refills: 0 | Status: COMPLETED | OUTPATIENT
Start: 2021-08-24 | End: 2021-08-24

## 2021-08-24 RX ORDER — SIMVASTATIN 20 MG/1
40 TABLET, FILM COATED ORAL AT BEDTIME
Refills: 0 | Status: DISCONTINUED | OUTPATIENT
Start: 2021-08-24 | End: 2021-08-26

## 2021-08-24 RX ORDER — HYDROMORPHONE HYDROCHLORIDE 2 MG/ML
1 INJECTION INTRAMUSCULAR; INTRAVENOUS; SUBCUTANEOUS
Refills: 0 | Status: DISCONTINUED | OUTPATIENT
Start: 2021-08-24 | End: 2021-08-24

## 2021-08-24 RX ORDER — HYDROMORPHONE HYDROCHLORIDE 2 MG/ML
0.5 INJECTION INTRAMUSCULAR; INTRAVENOUS; SUBCUTANEOUS
Refills: 0 | Status: DISCONTINUED | OUTPATIENT
Start: 2021-08-24 | End: 2021-08-26

## 2021-08-24 RX ADMIN — Medication 975 MILLIGRAM(S): at 21:50

## 2021-08-24 RX ADMIN — POLYETHYLENE GLYCOL 3350 17 GRAM(S): 17 POWDER, FOR SOLUTION ORAL at 21:48

## 2021-08-24 RX ADMIN — MORPHINE SULFATE 4 MILLIGRAM(S): 50 CAPSULE, EXTENDED RELEASE ORAL at 12:57

## 2021-08-24 RX ADMIN — CELECOXIB 400 MILLIGRAM(S): 200 CAPSULE ORAL at 13:50

## 2021-08-24 RX ADMIN — Medication 975 MILLIGRAM(S): at 21:47

## 2021-08-24 RX ADMIN — APREPITANT 40 MILLIGRAM(S): 80 CAPSULE ORAL at 13:50

## 2021-08-24 RX ADMIN — HYDROMORPHONE HYDROCHLORIDE 1 MILLIGRAM(S): 2 INJECTION INTRAMUSCULAR; INTRAVENOUS; SUBCUTANEOUS at 17:25

## 2021-08-24 RX ADMIN — HYDROMORPHONE HYDROCHLORIDE 1 MILLIGRAM(S): 2 INJECTION INTRAMUSCULAR; INTRAVENOUS; SUBCUTANEOUS at 17:55

## 2021-08-24 RX ADMIN — SODIUM CHLORIDE 500 MILLILITER(S): 9 INJECTION INTRAMUSCULAR; INTRAVENOUS; SUBCUTANEOUS at 18:43

## 2021-08-24 RX ADMIN — QUETIAPINE FUMARATE 25 MILLIGRAM(S): 200 TABLET, FILM COATED ORAL at 21:47

## 2021-08-24 RX ADMIN — Medication 975 MILLIGRAM(S): at 13:50

## 2021-08-24 RX ADMIN — Medication 15 MILLIGRAM(S): at 23:30

## 2021-08-24 RX ADMIN — SODIUM CHLORIDE 3 MILLILITER(S): 9 INJECTION INTRAMUSCULAR; INTRAVENOUS; SUBCUTANEOUS at 13:50

## 2021-08-24 RX ADMIN — SENNA PLUS 2 TABLET(S): 8.6 TABLET ORAL at 21:47

## 2021-08-24 RX ADMIN — Medication 100 MILLIGRAM(S): at 21:48

## 2021-08-24 RX ADMIN — FENTANYL CITRATE 50 MICROGRAM(S): 50 INJECTION INTRAVENOUS at 12:57

## 2021-08-24 RX ADMIN — SIMVASTATIN 40 MILLIGRAM(S): 20 TABLET, FILM COATED ORAL at 21:47

## 2021-08-24 RX ADMIN — MORPHINE SULFATE 4 MILLIGRAM(S): 50 CAPSULE, EXTENDED RELEASE ORAL at 12:24

## 2021-08-24 RX ADMIN — Medication 100 MILLIGRAM(S): at 15:10

## 2021-08-24 RX ADMIN — FENTANYL CITRATE 50 MICROGRAM(S): 50 INJECTION INTRAVENOUS at 12:58

## 2021-08-24 NOTE — ED PROVIDER NOTE - PATIENT PORTAL LINK FT
You can access the FollowMyHealth Patient Portal offered by [facility] by registering at the following website: [email or web address]. By joining [employer]’s FollowMyHealth portal, you will also be able to view your health information using other applications (apps) compatible with our system.

## 2021-08-24 NOTE — DISCHARGE NOTE PROVIDER - NSDCFUADDINST_GEN_ALL_CORE_FT
The patient will be seen in the office between 2-3 weeks for wound check.   **Your first post-operative visit has been scheduled prior to your admission. PLEASE CONTACT OFFICE TO CONFIRM THE APPOINTMENT DATE. Tape will be removed at that time.  **  The silver based dressing is to be removed 7 days from the date of surgery (8/31).     ** CONTACT THE OFFICE IF THE FOLLOWING DEVELOP:  - the dressing becomes soiled or saturated  - you develop a fever greater that 101F  - the wound becomes red or you develop blistering around the wound  * Patient may shower after post-op day #3 (8/27).   * The patient will continue home PT consistent with posterior total hip replacement protocol and will continue to practice posterior total hip precautions for a minimum of 6 week. Transition to outpatient PT will occur at the time of the first office visit.   * The patient is FULL weight bearing.  * The patient will continue ASPIRIN for 6 weeks after surgery for blood clot prevention.  * While on aspirin, the patient will take daily omeprazole or other similar medication to protect the stomach from irritation.   * The patient will take OXYCODONE AND TYLENOL for pain control and adjust according to prescription and patient needs. Contact the office if pain increases while taking prescribed pain medications or related concerns develop.  * Celebrex will be taken twice daily for 3 weeks for pain control and prevention of excessive bone growth. Additional prescription may be requested at your office follow-up visit.  * The patient will take Senna S while taking oxycodone to prevent narcotic associated constipation.  Additionally, increase water intake (drink at least 8 glasses of water daily) and try adding fiber to the diet by eating fruits, vegetables and foods that are rich in grains. If constipation is experienced, contact the medical/primary care provider to discuss further treatment options.  * To avoid injury at home:  - continue use of rolling walker until cleared by physical therapist  - have family or friend remove all throw rug or objects in hallways that may present a trip hazard.  - if you experience any dizziness or medical concerns, call your medical doctor or  911.  * The implant may activate metal detection devices.  The patient will be seen in the office between 2-3 weeks for wound check.   **Your first post-operative visit has been scheduled prior to your admission. PLEASE CONTACT OFFICE TO CONFIRM THE APPOINTMENT DATE. Tape will be removed at that time.  **  The silver based dressing is to be removed 7 days from the date of surgery (8/31).     ** CONTACT THE OFFICE IF THE FOLLOWING DEVELOP:  - the dressing becomes soiled or saturated  - you develop a fever greater that 101F  - the wound becomes red or you develop blistering around the wound  * Patient may shower after post-op day #3 (8/27).   * The patient will continue home PT consistent with posterior total hip replacement protocol and will continue to practice posterior total hip precautions for a minimum of 6 week. Transition to outpatient PT will occur at the time of the first office visit.   * The patient is FULL weight bearing.  * The patient will continue ASPIRIN for 6 weeks after surgery for blood clot prevention.  * While on aspirin, the patient will take daily omeprazole or other similar medication to protect the stomach from irritation.   * The patient will take PERCOCET for pain control and adjust according to prescription and patient needs. DO NOT TAKE TYLENOL WHILE TAKING PERCOCET. Contact the office if pain increases while taking prescribed pain medications or related concerns develop.  * Celebrex will be taken twice daily for 3 weeks for pain control and prevention of excessive bone growth. Additional prescription may be requested at your office follow-up visit.  * The patient will take Senna S while taking oxycodone to prevent narcotic associated constipation.  Additionally, increase water intake (drink at least 8 glasses of water daily) and try adding fiber to the diet by eating fruits, vegetables and foods that are rich in grains. If constipation is experienced, contact the medical/primary care provider to discuss further treatment options.  * To avoid injury at home:  - continue use of rolling walker until cleared by physical therapist  - have family or friend remove all throw rug or objects in hallways that may present a trip hazard.  - if you experience any dizziness or medical concerns, call your medical doctor or  911.  * The implant may activate metal detection devices.  The patient will be seen in the office between 2-3 weeks for wound check.   **Your first post-operative visit has been scheduled prior to your admission. PLEASE CONTACT OFFICE TO CONFIRM THE APPOINTMENT DATE. Tape will be removed at that time.  **  The silver based dressing is to be removed 7 days from the date of surgery (8/31).     ** CONTACT THE OFFICE IF THE FOLLOWING DEVELOP:  - the dressing becomes soiled or saturated  - you develop a fever greater that 101F  - the wound becomes red or you develop blistering around the wound  * Patient may shower after post-op day #3 (8/27).   * The patient will continue home PT consistent with posterior total hip replacement protocol and will continue to practice posterior total hip precautions for a minimum of 12 weeks. Transition to outpatient PT will occur at the time of the first office visit.   * The patient is FULL weight bearing.  * The patient will continue ASPIRIN for 6 weeks after surgery for blood clot prevention.  * While on aspirin, the patient will take daily omeprazole or other similar medication to protect the stomach from irritation.   * The patient will take PERCOCET for pain control and adjust according to prescription and patient needs. DO NOT TAKE TYLENOL WHILE TAKING PERCOCET. Contact the office if pain increases while taking prescribed pain medications or related concerns develop.  * Celebrex will be taken twice daily for 3 weeks for pain control and prevention of excessive bone growth. Additional prescription may be requested at your office follow-up visit.  * The patient will take Senna S while taking oxycodone to prevent narcotic associated constipation.  Additionally, increase water intake (drink at least 8 glasses of water daily) and try adding fiber to the diet by eating fruits, vegetables and foods that are rich in grains. If constipation is experienced, contact the medical/primary care provider to discuss further treatment options.  * To avoid injury at home:  - continue use of rolling walker until cleared by physical therapist  - have family or friend remove all throw rug or objects in hallways that may present a trip hazard.  - if you experience any dizziness or medical concerns, call your medical doctor or  911.  * The implant may activate metal detection devices.

## 2021-08-24 NOTE — ASU PREOP CHECKLIST - ANTIBIOTIC
COVID-19 Screening:    • Does the patient OR patient’s household members have any of the following symptoms?  o Temperature: Fever ?100.0°F or ?37.8°C?  No  o Respiratory symptoms: New or worsening cough, shortness of breath, difficulty breathing, or sore throat? No  o GI symptoms: New onset of nausea, vomiting or diarrhea?  No  o Miscellaneous: New onset of loss of taste or smell, chills, repeated shaking with chills, muscle pain, headache, congestion or runny nose?  No  • Has the patient or a household member tested positive for COVID-19 in the last 14 days?  No  • Has the patient or a household member been tested for COVID-19 and are waiting for the results?  No    
yes

## 2021-08-24 NOTE — PROGRESS NOTE ADULT - SUBJECTIVE AND OBJECTIVE BOX
Orthopedic PA Postop Note  Patient S/P LEFT TOTAL HIP REVISION  Patient in bed comfortable   LEFT Leg  Dressing C/D/I  DP Pulse intact  Calf Soft NT  Dorsi/Plantar Flexion/EHL/FHL Intact  Sensation intact to light touch  Abduction Pillow in place     Vital Signs Last 24 Hrs  T(C): 36.5 (24 Aug 2021 20:18), Max: 37.2 (24 Aug 2021 13:54)  T(F): 97.7 (24 Aug 2021 20:18), Max: 99 (24 Aug 2021 13:54)  HR: 74 (24 Aug 2021 20:18) (55 - 82)  BP: 116/72 (24 Aug 2021 20:18) (116/72 - 160/83)  BP(mean): --  RR: 18 (24 Aug 2021 20:18) (14 - 20)  SpO2: 94% (24 Aug 2021 20:18) (94% - 99%)      A/P:  S/P LEFT TOTAL HIP REVISION  1. DVTP - ASA  2. Physical Therapy - Posterior Precautions  3. Pain Control as clinically indicated

## 2021-08-24 NOTE — ED PROVIDER NOTE - NS ED ROS FT
Constitutional: no fever, no chills.  CV: no chest pain, no edema.  Resp: no cough, no dyspnea  GI: no abdominal pain, no nausea and no vomiting.  MSK: +msk pain, +weakness  Skin: no lesions, and no rashes.  Neuro: no LOC, no headache, no sensory deficits, and no dizziness  ROS otherwise negative except as noted in HPI.

## 2021-08-24 NOTE — DISCHARGE NOTE PROVIDER - NSDCFUSCHEDAPPT_GEN_ALL_CORE_FT
TRISHA LOVE ; 09/15/2021 ; [facility] [address] ; 10/12/2021 ; [facility] [address] ; 11/15/2021 ; [facility] [address]

## 2021-08-24 NOTE — ED PROVIDER NOTE - PROGRESS NOTE DETAILS
Pt. still having pain despite morphine being given. Pt. states that Fentanyl works better for her. No x-ray has been performed due to persistent pain to pt's hip. Pt. would like for her pain to be less before getting her x-ray. Orthopedic PA was called and case was discussed with him. spoke to orthopedics PAs and surgery RN Juan José, pt will go to OR under her previous chart MRN; will be d/c'd from ED to be admitted under Same Day Surgery. Discussed w/ patient, who voiced understanding and agrees.

## 2021-08-24 NOTE — ED PROVIDER NOTE - CLINICAL SUMMARY MEDICAL DECISION MAKING FREE TEXT BOX
67yo F w/pmh L hip replacement c/b recurrent L hip dislocations presents w/ L hip pain similar to prior hip dislocations. XRs pending. Ortho consulted. Morphine for pain.

## 2021-08-24 NOTE — PHYSICAL THERAPY INITIAL EVALUATION ADULT - ADDITIONAL COMMENTS
Pt reports living in a private home with her  and brother who are able assist as needed during the day when he's not sleeping. Pt has 4 steps to enter with 1 handrail and flight of stairs to her bedroom/bath. Pt owns RW, SAC, shower chair, and commode. Independent prior to admission. Pt drives & does not work.

## 2021-08-24 NOTE — DISCHARGE NOTE PROVIDER - PROVIDER TOKENS
Reason for Call:  Franny would like a triage nurse to call her regarding that start of a pressure ulcer on the back of her ankle again.    She would like suggestions of what she can do to prevent it from opening up and getting infected like it did last time.    Best phone number to reach pt at is: Cell: [phone] or Home: [phone]    Ok to leave a detailed message with medical info? yes    Pharmacy preferred (if calling for a refill): Jaylin Marie  Patient Representative            
Detailed message left with information noted below from provider and number to return call with any questions.  SHANTE De SouzaN, RN  [employer] Triage        
Franny called back to speak to triage. Please return her call.    Mita Marie  Patient Representative    
Franny needs to be seen to make sure this is not infected. Could be developing cellulitis.  
Non detailed message left for pt to return call to clinic and ask to speak with a triage nurse.    Neeta GIPSON RN  EP Triage      
Woke up last night with whole ankle pain.  The bone was hot pain.  Red with a funny spot in the center.  Is there anything she can put on it?  Area is not open at this time and wants to prevent that.  Using lambs wool bootie at this time but does not want another infection again.  States the biopsy area is purple in color and is hoping that is not becoming infected.  Cannot change positions since she is not able to roll.    Pharmacy pended.    Neeta GIPSON RN  EP Triage    
PROVIDER:[TOKEN:[[id number]]]

## 2021-08-24 NOTE — DISCHARGE NOTE PROVIDER - CARE PROVIDER_API CALL
German Guerrero)  Orthopaedic Surgery  [address], [facility] [address], [address]  [place] [address]  Phone: [phone]  Fax: [phone]  Follow Up Time:

## 2021-08-24 NOTE — PHYSICAL THERAPY INITIAL EVALUATION ADULT - GENERAL OBSERVATIONS, REHAB EVAL
Pt received in bed in PACU, + IV, +Tele//BP monitoring, + premafit + hip abduction pillow, breathing on RA in NAD, in 3/10 incisional pain, agreeable to PT evaluation

## 2021-08-24 NOTE — ED PROVIDER NOTE - PHYSICAL EXAMINATION
General: well appearing, NAD  Head: NC/AT  Cardiac: RRR, no m/r/g  Respiratory: CTABL, equal chest wall expansions, no conversational dyspnea  Abdomen: soft, ND, NT  Neuro: AAOx3, GCS 15  MSK: L buttock and hip ttp, ROM limited 2/2 pain. 2+ distal pulses. No ecchymosis, edema, or abrasion of the area.  Psych: calm, cooperative, normal affect  Skin: warm and dry

## 2021-08-24 NOTE — PHYSICAL THERAPY INITIAL EVALUATION ADULT - ASR EQUIP NEEDS DISCH PT EVAL
Anticipate no additional medical equipment needs pt has all DME at home already from previous surgeries

## 2021-08-24 NOTE — ED PROVIDER NOTE - ATTENDING CONTRIBUTION TO CARE
Pt. awake and alert. Pain to left hip. Pt. with hx of multiple dislocations to her hip. Pt. is scheduled for a revision of her left hip today by dr. Guerrero. Sensation intact to left leg. I, Dr. Philip, performed a face to face bedside interview with this patient regarding history of present illness, review of symptoms and relevant past medical, social and family history.  I completed an independent physical examination.  I have also reviewed the resident's note(s) and discussed the plan with the resident.

## 2021-08-24 NOTE — ED PROVIDER NOTE - OBJECTIVE STATEMENT
65yo F w/pmh L hip replacement c/b recurrent L hip dislocations presents w/ L hip pain similar to prior hip dislocations. Reports she was getting dressed this AM, twisted wrong, fell. Denies head trauma or LOC. Now c/o severe L hip pain radiating down the leg. Reports she dislocated twice in the past, most recently 5-6 weeks ago. Was scheduled for OR today w/ Dr. Chan for exploratory hip surgery. Last PO intake was water around 9:30am, last food was last night. Was given fentanyl by EMS to get her down the stairs of her home.

## 2021-08-24 NOTE — DISCHARGE NOTE PROVIDER - NSDCMRMEDTOKEN_GEN_ALL_CORE_FT
ibuprofen 400 mg oral tablet: 1 tab(s) orally every 6 hours, As Needed  omeprazole 20 mg oral delayed release capsule: 1 cap(s) orally once a day before breakfast MDD:1  QUEtiapine: 800 milligram(s) orally once a day (at bedtime)  simvastatin 40 mg oral tablet: 1 tab(s) orally once a day (at bedtime)  Xanax 2 mg oral tablet:    acetaminophen 325 mg oral tablet: 3 tab(s) orally every 8 hours  aspirin 325 mg oral delayed release tablet: 1 tab(s) orally 2 times a day  celecoxib 200 mg oral capsule: 1 cap(s) orally every 12 hours  omeprazole 20 mg oral delayed release tablet: 1 tab(s) orally once a day before breakfast. Take while on Aspirin.  oxyCODONE 5 mg oral tablet: 1 tab(s) orally every 4 hours, As Needed for moderate to severe pain. MDD:6  QUEtiapine: 800 milligram(s) orally once a day (at bedtime)  Senna S 50 mg-8.6 mg oral tablet: 2 tab(s) orally once a day (at bedtime), As Needed   simvastatin 40 mg oral tablet: 1 tab(s) orally once a day (at bedtime)  Xanax 2 mg oral tablet:    aspirin 325 mg oral delayed release tablet: 1 tab(s) orally 2 times a day  celecoxib 200 mg oral capsule: 1 cap(s) orally every 12 hours  omeprazole 20 mg oral delayed release tablet: 1 tab(s) orally once a day before breakfast. Take while on Aspirin.  oxycodone-acetaminophen 5 mg-325 mg oral tablet: 1 tab(s) orally every 4 hours, As Needed  for moderate to severe pain. DO NOT TAKE TYLENOL while taking this medication. MDD:6  QUEtiapine: 800 milligram(s) orally once a day (at bedtime)  Senna S 50 mg-8.6 mg oral tablet: 2 tab(s) orally once a day (at bedtime), As Needed   simvastatin 40 mg oral tablet: 1 tab(s) orally once a day (at bedtime)  Xanax 2 mg oral tablet:

## 2021-08-24 NOTE — DISCHARGE NOTE PROVIDER - NSDCCPCAREPLAN_GEN_ALL_CORE_FT
PRINCIPAL DISCHARGE DIAGNOSIS  Diagnosis: Mechanical complication of hip prosthesis  Assessment and Plan of Treatment:

## 2021-08-24 NOTE — DISCHARGE NOTE PROVIDER - HOSPITAL COURSE
The patient underwent a LEFT POSTERIOR TOTAL HIP REVISION on 8/24/21. The patient received antibiotics consistent with SCIP guidelines. The patient underwent the procedure and had no intra-operative complications. Post-operatively, the patient was seen by medicine and PT. The patient received ASPIRIN for DVTP. The patient received pain medications per orthopedic pain management pathway and the pain was appropriately controlled. Patient was instructed on posterior total hip precautions by PT. The patient did not have any post-operative medical complications. The patient was discharged in stable condition. The patient underwent a LEFT POSTERIOR TOTAL HIP REVISION on 8/24/21. The patient received antibiotics consistent with SCIP guidelines. The patient underwent the procedure and had no intra-operative complications. Post-operatively, the patient was seen by medicine and PT. The patient received ASPIRIN for DVTP. The patient received pain medications per orthopedic pain management pathway and the pain was appropriately controlled. Patient was instructed multiple times on posterior total hip precautions by PT. The patient did not have any post-operative medical complications. The patient was discharged in stable condition.

## 2021-08-25 ENCOUNTER — TRANSCRIPTION ENCOUNTER (OUTPATIENT)
Age: 66
End: 2021-08-25

## 2021-08-25 DIAGNOSIS — F41.9 ANXIETY DISORDER, UNSPECIFIED: ICD-10-CM

## 2021-08-25 DIAGNOSIS — E78.5 HYPERLIPIDEMIA, UNSPECIFIED: ICD-10-CM

## 2021-08-25 DIAGNOSIS — T84.018A BROKEN INTERNAL JOINT PROSTHESIS, OTHER SITE, INITIAL ENCOUNTER: ICD-10-CM

## 2021-08-25 LAB
ANION GAP SERPL CALC-SCNC: 11 MMOL/L — SIGNIFICANT CHANGE UP (ref 5–17)
BUN SERPL-MCNC: 14.8 MG/DL — SIGNIFICANT CHANGE UP (ref 8–20)
CALCIUM SERPL-MCNC: 8.9 MG/DL — SIGNIFICANT CHANGE UP (ref 8.6–10.2)
CHLORIDE SERPL-SCNC: 105 MMOL/L — SIGNIFICANT CHANGE UP (ref 98–107)
CO2 SERPL-SCNC: 22 MMOL/L — SIGNIFICANT CHANGE UP (ref 22–29)
COVID-19 SPIKE DOMAIN AB INTERP: POSITIVE
COVID-19 SPIKE DOMAIN ANTIBODY RESULT: >250 U/ML — HIGH
CREAT SERPL-MCNC: 0.71 MG/DL — SIGNIFICANT CHANGE UP (ref 0.5–1.3)
GLUCOSE BLDC GLUCOMTR-MCNC: 143 MG/DL — HIGH (ref 70–99)
GLUCOSE SERPL-MCNC: 143 MG/DL — HIGH (ref 70–99)
HCT VFR BLD CALC: 37.9 % — SIGNIFICANT CHANGE UP (ref 34.5–45)
HGB BLD-MCNC: 12.3 G/DL — SIGNIFICANT CHANGE UP (ref 11.5–15.5)
MCHC RBC-ENTMCNC: 30.9 PG — SIGNIFICANT CHANGE UP (ref 27–34)
MCHC RBC-ENTMCNC: 32.5 GM/DL — SIGNIFICANT CHANGE UP (ref 32–36)
MCV RBC AUTO: 95.2 FL — SIGNIFICANT CHANGE UP (ref 80–100)
PLATELET # BLD AUTO: 193 K/UL — SIGNIFICANT CHANGE UP (ref 150–400)
POTASSIUM SERPL-MCNC: 4.9 MMOL/L — SIGNIFICANT CHANGE UP (ref 3.5–5.3)
POTASSIUM SERPL-SCNC: 4.9 MMOL/L — SIGNIFICANT CHANGE UP (ref 3.5–5.3)
RBC # BLD: 3.98 M/UL — SIGNIFICANT CHANGE UP (ref 3.8–5.2)
RBC # FLD: 12.9 % — SIGNIFICANT CHANGE UP (ref 10.3–14.5)
SARS-COV-2 IGG+IGM SERPL QL IA: >250 U/ML — HIGH
SARS-COV-2 IGG+IGM SERPL QL IA: POSITIVE
SODIUM SERPL-SCNC: 138 MMOL/L — SIGNIFICANT CHANGE UP (ref 135–145)
WBC # BLD: 9.5 K/UL — SIGNIFICANT CHANGE UP (ref 3.8–10.5)
WBC # FLD AUTO: 9.5 K/UL — SIGNIFICANT CHANGE UP (ref 3.8–10.5)

## 2021-08-25 PROCEDURE — 99222 1ST HOSP IP/OBS MODERATE 55: CPT

## 2021-08-25 RX ORDER — IBUPROFEN 200 MG
1 TABLET ORAL
Qty: 0 | Refills: 0 | DISCHARGE

## 2021-08-25 RX ORDER — ACETAMINOPHEN 500 MG
3 TABLET ORAL
Qty: 0 | Refills: 0 | DISCHARGE
Start: 2021-08-25

## 2021-08-25 RX ORDER — OMEPRAZOLE 10 MG/1
1 CAPSULE, DELAYED RELEASE ORAL
Qty: 42 | Refills: 0
Start: 2021-08-25 | End: 2021-10-05

## 2021-08-25 RX ORDER — NICOTINE POLACRILEX 2 MG
1 GUM BUCCAL DAILY
Refills: 0 | Status: DISCONTINUED | OUTPATIENT
Start: 2021-08-25 | End: 2021-08-26

## 2021-08-25 RX ORDER — OXYCODONE AND ACETAMINOPHEN 5; 325 MG/1; MG/1
1 TABLET ORAL
Qty: 42 | Refills: 0
Start: 2021-08-25 | End: 2021-08-31

## 2021-08-25 RX ORDER — ASPIRIN/CALCIUM CARB/MAGNESIUM 324 MG
1 TABLET ORAL
Qty: 84 | Refills: 0
Start: 2021-08-25 | End: 2021-10-05

## 2021-08-25 RX ORDER — SODIUM CHLORIDE 9 MG/ML
500 INJECTION INTRAMUSCULAR; INTRAVENOUS; SUBCUTANEOUS ONCE
Refills: 0 | Status: COMPLETED | OUTPATIENT
Start: 2021-08-25 | End: 2021-08-25

## 2021-08-25 RX ORDER — OXYCODONE HYDROCHLORIDE 5 MG/1
1 TABLET ORAL
Qty: 42 | Refills: 0
Start: 2021-08-25 | End: 2021-08-31

## 2021-08-25 RX ORDER — SODIUM CHLORIDE 9 MG/ML
1000 INJECTION INTRAMUSCULAR; INTRAVENOUS; SUBCUTANEOUS
Refills: 0 | Status: DISCONTINUED | OUTPATIENT
Start: 2021-08-25 | End: 2021-08-26

## 2021-08-25 RX ORDER — SENNOSIDES/DOCUSATE SODIUM 8.6MG-50MG
2 TABLET ORAL
Qty: 20 | Refills: 0
Start: 2021-08-25 | End: 2021-09-03

## 2021-08-25 RX ADMIN — PANTOPRAZOLE SODIUM 40 MILLIGRAM(S): 20 TABLET, DELAYED RELEASE ORAL at 06:00

## 2021-08-25 RX ADMIN — SIMVASTATIN 40 MILLIGRAM(S): 20 TABLET, FILM COATED ORAL at 22:07

## 2021-08-25 RX ADMIN — OXYCODONE HYDROCHLORIDE 10 MILLIGRAM(S): 5 TABLET ORAL at 23:58

## 2021-08-25 RX ADMIN — Medication 325 MILLIGRAM(S): at 06:00

## 2021-08-25 RX ADMIN — OXYCODONE HYDROCHLORIDE 10 MILLIGRAM(S): 5 TABLET ORAL at 17:00

## 2021-08-25 RX ADMIN — Medication 975 MILLIGRAM(S): at 12:22

## 2021-08-25 RX ADMIN — Medication 975 MILLIGRAM(S): at 22:07

## 2021-08-25 RX ADMIN — Medication 15 MILLIGRAM(S): at 06:04

## 2021-08-25 RX ADMIN — SODIUM CHLORIDE 75 MILLILITER(S): 9 INJECTION INTRAMUSCULAR; INTRAVENOUS; SUBCUTANEOUS at 06:00

## 2021-08-25 RX ADMIN — Medication 975 MILLIGRAM(S): at 06:04

## 2021-08-25 RX ADMIN — Medication 975 MILLIGRAM(S): at 11:52

## 2021-08-25 RX ADMIN — SENNA PLUS 2 TABLET(S): 8.6 TABLET ORAL at 22:07

## 2021-08-25 RX ADMIN — Medication 1 PATCH: at 16:30

## 2021-08-25 RX ADMIN — Medication 975 MILLIGRAM(S): at 06:00

## 2021-08-25 RX ADMIN — Medication 325 MILLIGRAM(S): at 16:29

## 2021-08-25 RX ADMIN — Medication 15 MILLIGRAM(S): at 11:51

## 2021-08-25 RX ADMIN — Medication 975 MILLIGRAM(S): at 23:51

## 2021-08-25 RX ADMIN — OXYCODONE HYDROCHLORIDE 10 MILLIGRAM(S): 5 TABLET ORAL at 22:11

## 2021-08-25 RX ADMIN — SODIUM CHLORIDE 500 MILLILITER(S): 9 INJECTION INTRAMUSCULAR; INTRAVENOUS; SUBCUTANEOUS at 08:13

## 2021-08-25 RX ADMIN — OXYCODONE HYDROCHLORIDE 10 MILLIGRAM(S): 5 TABLET ORAL at 16:30

## 2021-08-25 RX ADMIN — Medication 15 MILLIGRAM(S): at 12:22

## 2021-08-25 RX ADMIN — Medication 15 MILLIGRAM(S): at 06:00

## 2021-08-25 RX ADMIN — MAGNESIUM HYDROXIDE 30 MILLILITER(S): 400 TABLET, CHEWABLE ORAL at 18:51

## 2021-08-25 RX ADMIN — QUETIAPINE FUMARATE 25 MILLIGRAM(S): 200 TABLET, FILM COATED ORAL at 22:07

## 2021-08-25 RX ADMIN — Medication 100 MILLIGRAM(S): at 06:01

## 2021-08-25 NOTE — DISCHARGE NOTE NURSING/CASE MANAGEMENT/SOCIAL WORK - NSDCPEFALRISK_GEN_ALL_CORE
For information on Fall & injury Prevention, visit [email or web address]

## 2021-08-25 NOTE — DISCHARGE NOTE NURSING/CASE MANAGEMENT/SOCIAL WORK - PATIENT PORTAL LINK FT
You can access the FollowMyHealth Patient Portal offered by [facility] by registering at the following website: [email or web address]. By joining [facility]’s FollowMyHealth portal, you will also be able to view your health information using other applications (apps) compatible with our system.

## 2021-08-25 NOTE — CONSULT NOTE ADULT - PROBLEM SELECTOR RECOMMENDATION 4
DVT prophylaxis  - as per ortho protocol  Opioid induced constipation  prophylaxis - bowel regimen   Postoperative infection  prophylaxis in high risk patient.     Thank you for the courtesy of this consult .   Dispo plan - Home with physical therapy .    Medically stable to d/c once cleared by physical therapy / ortho .   Will no longer follow , please recall if needed .

## 2021-08-25 NOTE — OCCUPATIONAL THERAPY INITIAL EVALUATION ADULT - BATHING TRAINING, ASSISTIVE DEVICE, OT EVAL
educated on compensatory bathing techniques to adhere to total hip precautions/long handled sponge/tub seat
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
## 2021-08-25 NOTE — CONSULT NOTE ADULT - ASSESSMENT
66 year old female present today for pst. She report having her left hip replaced due to OA of the left hip. She state doing will for many years. She endorse two episodes when her left his dislocated. One time when she was cleaning the bathroom and another time when she bent over to  a item.  She was evaluated in the ER both times and had a closed reduction under anesthesia.  Yesterday ( on 8/24 ) hip dislocated before coming to hospital again . Now s/p L RITIKA revision .

## 2021-08-25 NOTE — OCCUPATIONAL THERAPY INITIAL EVALUATION ADULT - PREDICTED DURATION OF THERAPY (DAYS/WKS), OT EVAL
1-2 days Consent 2/Introductory Paragraph: Mohs surgery was explained to the patient and consent was obtained. The risks, benefits and alternatives to therapy were discussed in detail. Specifically, the risks of infection, scarring, bleeding, prolonged wound healing, incomplete removal, allergy to anesthesia, nerve injury and recurrence were addressed. Prior to the procedure, the treatment site was clearly identified and confirmed by the patient. All components of Universal Protocol/PAUSE Rule completed.

## 2021-08-25 NOTE — OCCUPATIONAL THERAPY INITIAL EVALUATION ADULT - ADDITIONAL COMMENTS
as per pt, independent prior  has DME from prior surgeries (transfer tub bench, rw, cane, hip kit)  lives with  and is able to assist

## 2021-08-25 NOTE — DISCHARGE NOTE NURSING/CASE MANAGEMENT/SOCIAL WORK - NSDCFUADDAPPT_GEN_ALL_CORE_FT
Please follow up with your primary care physician: Dr Raudel Mccoy in [place] [phone]  Please follow up with your orthopedic surgeon: Dr German Guerrero in [place] [phone]

## 2021-08-25 NOTE — PROGRESS NOTE ADULT - SUBJECTIVE AND OBJECTIVE BOX
TRISHA LOVE    [id number]    History: Patient is status post left posterior total hip revision - liner/head exhange POD #1. Patient is doing well and is comfortable. The patient's pain is controlled using the prescribed pain medications. The patient is participating in physical therapy. Patient tolerating PO diet and fluids, voiding without complications. Patient endorsing pain to her left foot and numbness to her left foot that - patient states that it has been like this since her fall and dislocation yesterday. Denies CP, SOB, dizziness, HA, fever/chills, numbness or tingling. No new complaints.    Vital Signs Last 24 Hrs  T(C): 36.8 (25 Aug 2021 04:44), Max: 37.2 (24 Aug 2021 13:54)  T(F): 98.3 (25 Aug 2021 04:44), Max: 99 (24 Aug 2021 13:54)  HR: 73 (25 Aug 2021 04:44) (55 - 83)  BP: 94/56 (25 Aug 2021 04:44) (94/56 - 160/83)  BP(mean): --  RR: 18 (25 Aug 2021 04:44) (14 - 20)  SpO2: 94% (25 Aug 2021 04:44) (94% - 99%)                          12.3   9.50  )-----------( 193      ( 25 Aug 2021 07:07 )             37.9     08-24    138  |  102  |  13.8  ----------------------------<  107<H>  4.7   |  26.0  |  0.73    Ca    9.4      24 Aug 2021 12:45    TPro  7.4  /  Alb  4.3  /  TBili  0.3<L>  /  DBili  x   /  AST  19  /  ALT  10  /  AlkPhos  100  08-24      General: Alert, awake, NAD, abduction pillow in place  Physical exam: The left hip dressing is clean, dry and intact. No drainage, discharge noted on dressing. No erythema, blistering or ecchymosis noted to visible skin. The calf is supple nontender b/l. SILT. +EHL/FHL/AT/GC. No foot drop noted. 2+ DP pulse. BCR. No cyanosis.      Plan:   - DVT prophylaxis as prescribed, including use of compression devices and ankle pumps  - Continue physical therapy  - Weight bearing status of surgical extremity: WBAT  - Incentive spirometry encouraged  - Pain control as clinically indicated  - Posterior hip precautions reviewed with patient  - Discharge planning – anticipated discharge is Home today when cleared by PT and Medicine   - f/u AM labs   TRISHA IVAN    [id number]    History: Patient is status post left posterior total hip revision - liner/head exhange POD #1. Patient is doing well and is comfortable. The patient's pain is controlled using the prescribed pain medications. The patient is participating in physical therapy. Patient tolerating PO diet and fluids, voiding without complications. Patient endorsing pain to her left foot and numbness to her left foot that - patient states that it has been like this since her fall and dislocation yesterday. Denies CP, SOB, dizziness, HA, fever/chills, numbness or tingling. No new complaints.    Vital Signs Last 24 Hrs  T(C): 36.8 (25 Aug 2021 04:44), Max: 37.2 (24 Aug 2021 13:54)  T(F): 98.3 (25 Aug 2021 04:44), Max: 99 (24 Aug 2021 13:54)  HR: 73 (25 Aug 2021 04:44) (55 - 83)  BP: 94/56 (25 Aug 2021 04:44) (94/56 - 160/83)  BP(mean): --  RR: 18 (25 Aug 2021 04:44) (14 - 20)  SpO2: 94% (25 Aug 2021 04:44) (94% - 99%)                          12.3   9.50  )-----------( 193      ( 25 Aug 2021 07:07 )             37.9     08-24    138  |  102  |  13.8  ----------------------------<  107<H>  4.7   |  26.0  |  0.73    Ca    9.4      24 Aug 2021 12:45    TPro  7.4  /  Alb  4.3  /  TBili  0.3<L>  /  DBili  x   /  AST  19  /  ALT  10  /  AlkPhos  100  08-24      General: Alert, awake, NAD, abduction pillow in place  Physical exam: The left hip dressing is clean, dry and intact. No drainage, discharge noted on dressing. No erythema, blistering or ecchymosis noted to visible skin. The calf is supple nontender b/l. SILT. +EHL/FHL/AT/GC. No foot drop noted. 2+ DP pulse. BCR. No cyanosis.      Plan:   - Dr. Guerrero aware of numbness to left foot - reassurance   - Hypotensive - Bolus ordered prior to working with PT   - DVT prophylaxis as prescribed, including use of compression devices and ankle pumps  - Continue physical therapy  - Weight bearing status of surgical extremity: WBAT  - Incentive spirometry encouraged  - Pain control as clinically indicated  - Posterior hip precautions reviewed with patient  - Discharge planning – anticipated discharge is Home today when cleared by PT and Medicine   - f/u AM labs   TRISHA LOVE    [id number]    History: Patient is status post left posterior total hip revision - liner/head exhange POD #1. Patient is doing well and is comfortable. The patient's pain is controlled using the prescribed pain medications. The patient is participating in physical therapy. Patient tolerating PO diet and fluids, voiding without complications. Patient endorsing pain to her left foot and numbness to her left foot that - patient states that it has been like this since her fall and dislocation yesterday. Patient states that only percocet controls her pain while at home. Denies CP, SOB, dizziness, HA, fever/chills, numbness or tingling. No new complaints.    Vital Signs Last 24 Hrs  T(C): 36.8 (25 Aug 2021 04:44), Max: 37.2 (24 Aug 2021 13:54)  T(F): 98.3 (25 Aug 2021 04:44), Max: 99 (24 Aug 2021 13:54)  HR: 73 (25 Aug 2021 04:44) (55 - 83)  BP: 94/56 (25 Aug 2021 04:44) (94/56 - 160/83)  BP(mean): --  RR: 18 (25 Aug 2021 04:44) (14 - 20)  SpO2: 94% (25 Aug 2021 04:44) (94% - 99%)                          12.3   9.50  )-----------( 193      ( 25 Aug 2021 07:07 )             37.9     08-24    138  |  102  |  13.8  ----------------------------<  107<H>  4.7   |  26.0  |  0.73    Ca    9.4      24 Aug 2021 12:45    TPro  7.4  /  Alb  4.3  /  TBili  0.3<L>  /  DBili  x   /  AST  19  /  ALT  10  /  AlkPhos  100  08-24      General: Alert, awake, NAD, abduction pillow in place  Physical exam: The left hip dressing is clean, dry and intact. No drainage, discharge noted on dressing. No erythema, blistering or ecchymosis noted to visible skin. The calf is supple nontender b/l. SILT. +EHL/FHL/AT/GC. No foot drop noted. 2+ DP pulse. BCR. No cyanosis.      Plan:   - Dr. Guerrero aware of numbness to left foot - reassurance   - Hypotensive - Bolus ordered prior to working with PT   - DVT prophylaxis as prescribed, including use of compression devices and ankle pumps  - Continue physical therapy  - Weight bearing status of surgical extremity: WBAT  - Incentive spirometry encouraged  - Pain control as clinically indicated  - Posterior hip precautions reviewed with patient  - Discharge planning – anticipated discharge is Home today when cleared by PT and Medicine   - f/u AM labs   TRISHA LOVE    [id number]    History: Patient is status post left posterior total hip revision - liner/head exhange POD #1. Patient is doing well and is comfortable. The patient's pain is controlled using the prescribed pain medications. The patient is participating in physical therapy. Patient tolerating PO diet and fluids, voiding without complications. Patient endorsing pain to her left foot and numbness to her left foot  -  residual since her fall and dislocation yesterday. Patient states that only percocet controls her pain while at home. Denies CP, SOB, dizziness, HA, fever/chills, numbness or tingling. No new complaints.    Vital Signs Last 24 Hrs  T(C): 36.8 (25 Aug 2021 04:44), Max: 37.2 (24 Aug 2021 13:54)  T(F): 98.3 (25 Aug 2021 04:44), Max: 99 (24 Aug 2021 13:54)  HR: 73 (25 Aug 2021 04:44) (55 - 83)  BP: 94/56 (25 Aug 2021 04:44) (94/56 - 160/83)  BP(mean): --  RR: 18 (25 Aug 2021 04:44) (14 - 20)  SpO2: 94% (25 Aug 2021 04:44) (94% - 99%)                          12.3   9.50  )-----------( 193      ( 25 Aug 2021 07:07 )             37.9     08-24    138  |  102  |  13.8  ----------------------------<  107<H>  4.7   |  26.0  |  0.73    Ca    9.4      24 Aug 2021 12:45    TPro  7.4  /  Alb  4.3  /  TBili  0.3<L>  /  DBili  x   /  AST  19  /  ALT  10  /  AlkPhos  100  08-24      General: Alert, awake, NAD, abduction pillow in place  Physical exam: The left hip dressing is clean, dry and intact. No drainage, discharge noted on dressing. No erythema, blistering or ecchymosis noted to visible skin. The calf is supple nontender b/l. SILT. +EHL/FHL/AT/GC. No foot drop noted. 2+ DP pulse. BCR. No cyanosis.      Plan:   - Dr. Guerrero aware of numbness to left foot - reassurance   - Hypotensive - Bolus ordered prior to working with PT   - DVT prophylaxis as prescribed, including use of compression devices and ankle pumps  - Continue physical therapy  - Weight bearing status of surgical extremity: WBAT  - Incentive spirometry encouraged  - Pain control as clinically indicated  - Posterior hip precautions reviewed with patient  - Discharge planning – anticipated discharge is Home today when cleared by PT and Medicine   - f/u AM labs

## 2021-08-25 NOTE — CONSULT NOTE ADULT - PROBLEM SELECTOR RECOMMENDATION 9
s/p L RITIKA revision   PT/OT/pain mgmt  DVT prophylaxis- as per ortho  Abx as per SCIP- given   Incentive spirometry  Prophylaxis of opioid  induced constipation.

## 2021-08-25 NOTE — OCCUPATIONAL THERAPY INITIAL EVALUATION ADULT - LOWER BODY DRESSING, ASSISTIVE DEVICE, OT EVAL
educated on compensatory dressing techniques, educated on proper use of hip kit to adhere to total hip precautions/dressing stick/elastic laces/long-handled shoe horn/reacher/sock-aid

## 2021-08-25 NOTE — OCCUPATIONAL THERAPY INITIAL EVALUATION ADULT - MODIFIED CLINICAL TEST OF SENSORY INTEGRATION IN BALANCE TEST
pt ambulated in room and bathroom with rw, no LOB, Supervision for vc's for proper negotiation of rw and keep rw in proper relation to self to decrease fall risk

## 2021-08-26 VITALS
SYSTOLIC BLOOD PRESSURE: 144 MMHG | RESPIRATION RATE: 18 BRPM | OXYGEN SATURATION: 96 % | DIASTOLIC BLOOD PRESSURE: 88 MMHG | TEMPERATURE: 98 F | HEART RATE: 61 BPM

## 2021-08-26 LAB
ANION GAP SERPL CALC-SCNC: 9 MMOL/L — SIGNIFICANT CHANGE UP (ref 5–17)
BUN SERPL-MCNC: 17 MG/DL — SIGNIFICANT CHANGE UP (ref 8–20)
CALCIUM SERPL-MCNC: 8.6 MG/DL — SIGNIFICANT CHANGE UP (ref 8.6–10.2)
CHLORIDE SERPL-SCNC: 108 MMOL/L — HIGH (ref 98–107)
CO2 SERPL-SCNC: 24 MMOL/L — SIGNIFICANT CHANGE UP (ref 22–29)
CREAT SERPL-MCNC: 0.67 MG/DL — SIGNIFICANT CHANGE UP (ref 0.5–1.3)
GLUCOSE SERPL-MCNC: 93 MG/DL — SIGNIFICANT CHANGE UP (ref 70–99)
HCT VFR BLD CALC: 35.1 % — SIGNIFICANT CHANGE UP (ref 34.5–45)
HGB BLD-MCNC: 11.8 G/DL — SIGNIFICANT CHANGE UP (ref 11.5–15.5)
MCHC RBC-ENTMCNC: 31.9 PG — SIGNIFICANT CHANGE UP (ref 27–34)
MCHC RBC-ENTMCNC: 33.6 GM/DL — SIGNIFICANT CHANGE UP (ref 32–36)
MCV RBC AUTO: 94.9 FL — SIGNIFICANT CHANGE UP (ref 80–100)
PLATELET # BLD AUTO: 148 K/UL — LOW (ref 150–400)
POTASSIUM SERPL-MCNC: 4.4 MMOL/L — SIGNIFICANT CHANGE UP (ref 3.5–5.3)
POTASSIUM SERPL-SCNC: 4.4 MMOL/L — SIGNIFICANT CHANGE UP (ref 3.5–5.3)
RBC # BLD: 3.7 M/UL — LOW (ref 3.8–5.2)
RBC # FLD: 13.2 % — SIGNIFICANT CHANGE UP (ref 10.3–14.5)
SODIUM SERPL-SCNC: 141 MMOL/L — SIGNIFICANT CHANGE UP (ref 135–145)
WBC # BLD: 6.99 K/UL — SIGNIFICANT CHANGE UP (ref 3.8–10.5)
WBC # FLD AUTO: 6.99 K/UL — SIGNIFICANT CHANGE UP (ref 3.8–10.5)

## 2021-08-26 PROCEDURE — 80048 BASIC METABOLIC PNL TOTAL CA: CPT

## 2021-08-26 PROCEDURE — 86769 SARS-COV-2 COVID-19 ANTIBODY: CPT

## 2021-08-26 PROCEDURE — 85610 PROTHROMBIN TIME: CPT

## 2021-08-26 PROCEDURE — 97166 OT EVAL MOD COMPLEX 45 MIN: CPT

## 2021-08-26 PROCEDURE — 73502 X-RAY EXAM HIP UNI 2-3 VIEWS: CPT

## 2021-08-26 PROCEDURE — 99285 EMERGENCY DEPT VISIT HI MDM: CPT | Mod: 25

## 2021-08-26 PROCEDURE — 85730 THROMBOPLASTIN TIME PARTIAL: CPT

## 2021-08-26 PROCEDURE — C1776: CPT

## 2021-08-26 PROCEDURE — 85027 COMPLETE CBC AUTOMATED: CPT

## 2021-08-26 PROCEDURE — 82962 GLUCOSE BLOOD TEST: CPT

## 2021-08-26 PROCEDURE — 87635 SARS-COV-2 COVID-19 AMP PRB: CPT

## 2021-08-26 PROCEDURE — 85025 COMPLETE CBC W/AUTO DIFF WBC: CPT

## 2021-08-26 PROCEDURE — 97163 PT EVAL HIGH COMPLEX 45 MIN: CPT

## 2021-08-26 PROCEDURE — 96374 THER/PROPH/DIAG INJ IV PUSH: CPT

## 2021-08-26 PROCEDURE — 96375 TX/PRO/DX INJ NEW DRUG ADDON: CPT

## 2021-08-26 PROCEDURE — 80053 COMPREHEN METABOLIC PANEL: CPT

## 2021-08-26 PROCEDURE — 36415 COLL VENOUS BLD VENIPUNCTURE: CPT

## 2021-08-26 RX ORDER — CELECOXIB 200 MG/1
1 CAPSULE ORAL
Qty: 42 | Refills: 0
Start: 2021-08-26 | End: 2021-09-15

## 2021-08-26 RX ADMIN — CELECOXIB 200 MILLIGRAM(S): 200 CAPSULE ORAL at 05:42

## 2021-08-26 RX ADMIN — Medication 325 MILLIGRAM(S): at 05:44

## 2021-08-26 RX ADMIN — Medication 975 MILLIGRAM(S): at 06:56

## 2021-08-26 RX ADMIN — Medication 975 MILLIGRAM(S): at 05:43

## 2021-08-26 RX ADMIN — CELECOXIB 200 MILLIGRAM(S): 200 CAPSULE ORAL at 06:57

## 2021-08-26 RX ADMIN — PANTOPRAZOLE SODIUM 40 MILLIGRAM(S): 20 TABLET, DELAYED RELEASE ORAL at 05:43
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
## 2021-09-15 ENCOUNTER — APPOINTMENT (OUTPATIENT)
Dept: ORTHOPEDIC SURGERY | Facility: CLINIC | Age: 66
End: 2021-09-15
Payer: MEDICARE

## 2021-09-15 VITALS
WEIGHT: 150 LBS | BODY MASS INDEX: 27.6 KG/M2 | SYSTOLIC BLOOD PRESSURE: 107 MMHG | HEIGHT: 62 IN | DIASTOLIC BLOOD PRESSURE: 74 MMHG | HEART RATE: 85 BPM

## 2021-09-15 PROCEDURE — 99024 POSTOP FOLLOW-UP VISIT: CPT

## 2021-09-15 PROCEDURE — 72190 X-RAY EXAM OF PELVIS: CPT

## 2021-09-15 NOTE — HISTORY OF PRESENT ILLNESS
[Procedure: ___] : status post [unfilled] [Clean/Dry/Intact] : clean, dry and intact [Healed] : healed [Swelling] : swollen [Neuro Intact] : an unremarkable neurological exam [Vascular Intact] : ~T peripheral vascular exam normal [Negative Nishant's] : maneuvers demonstrated a negative Nishant's sign [Xray (Date:___)] : [unfilled] Xray -  [Doing Well] : is doing well [No Sign of Infection] : is showing no signs of infection [Adequate Pain Control] : has adequate pain control [1] : the patient reports pain that is 1/10 in severity [Chills] : no chills [Constipation] : no constipation [Diarrhea] : no diarrhea [Dysuria] : no dysuria [Fever] : no fever [Nausea] : no nausea [Vomiting] : no vomiting [Erythema] : not erythematous [Discharge] : absent of discharge [Dehiscence] : not dehisced [de-identified] : S/P Left total hip  arthroplasty including acetabular liner and femoral head, DOS: 8/24/21.\par S/P left TKR 1/19/2021. \par  [de-identified] : Pt is 3 weeks post-op. The patient is happy with her progress so far. She is ambulating without any assistive devices. She is finishing home PT. She is not taking pain medication.  [de-identified] : Left hip exam shows healed incision with no sign of infection  [de-identified] : 3V xray of the left hip done in the office today and reviewed by Dr. German Guerrero demonstrates s/p left RITIKA revision implants in good positioning with no evidence of wear, loosening, or subsidence.  [de-identified] : Pt should continue taking Aspirin BID for DVTP. Patient will continue to adhere to the posterior hip precautions. She should continue to do low impact exercises. We prescribed PT. Pt understands the importance of prophylaxis for invasive dental procedures. F/u with us in 3 weeks.

## 2021-09-15 NOTE — END OF VISIT
[FreeTextEntry3] : I, Devan Farah, acted solely as a scribe for Dr. German Guerrero on this date 09/15/2021.

## 2021-10-12 ENCOUNTER — APPOINTMENT (OUTPATIENT)
Dept: ORTHOPEDIC SURGERY | Facility: CLINIC | Age: 66
End: 2021-10-12

## 2021-11-15 ENCOUNTER — APPOINTMENT (OUTPATIENT)
Dept: ORTHOPEDIC SURGERY | Facility: CLINIC | Age: 66
End: 2021-11-15
Payer: MEDICARE

## 2021-11-15 PROCEDURE — 99024 POSTOP FOLLOW-UP VISIT: CPT

## 2021-11-15 PROCEDURE — 73502 X-RAY EXAM HIP UNI 2-3 VIEWS: CPT

## 2021-11-15 NOTE — END OF VISIT
[FreeTextEntry3] : I, Devan Farah, acted solely as a scribe for Dr. German Guerrero on this date 11/15/2021.

## 2021-11-15 NOTE — HISTORY OF PRESENT ILLNESS
[Procedure: ___] : status post [unfilled] [2] : the patient reports pain that is 2/10 in severity [Clean/Dry/Intact] : clean, dry and intact [Healed] : healed [Neuro Intact] : an unremarkable neurological exam [Vascular Intact] : ~T peripheral vascular exam normal [Negative Nishant's] : maneuvers demonstrated a negative Nishant's sign [Xray (Date:___)] : [unfilled] Xray -  [Doing Well] : is doing well [No Sign of Infection] : is showing no signs of infection [Adequate Pain Control] : has adequate pain control [Chills] : no chills [Constipation] : no constipation [Diarrhea] : no diarrhea [Dysuria] : no dysuria [Fever] : no fever [Nausea] : no nausea [Vomiting] : no vomiting [Erythema] : not erythematous [Discharge] : absent of discharge [Swelling] : not swollen [Dehiscence] : not dehisced [de-identified] : s/p left total hip arthroplasty including acetabular liner and femoral head. 8/24/21 [de-identified] : Pt is 12 weeks post-op. She stopped going to PT because she was taking care of a family member who had an episode of Bell's Palsy. She would like to go back to PT. She is ambulating without any assistive devices. She intermittently takes Percocet and states that she has 6 pills left, but she would like another rx for it.  [de-identified] : Left hip exam shows healed incision with no sign of infection.  [de-identified] : 3V xray of the left hip done in the office today and reviewed by Dr. German Guerrero demonstrates s/p implants in good positioning with no evidence of wear, loosening, or subsidence.  [de-identified] : We gave the pt another rx for PT today. She should continue to do low impact exercises. The patient is asking for more Percocet today. We gave her another rx for it today and she understands that we will no longer give her prescriptions for Percocet. Pt understands the importance of prophylaxis for invasive dental procedures. F/u with us a year from surgery.

## 2021-12-17 ENCOUNTER — APPOINTMENT (OUTPATIENT)
Dept: ORTHOPEDIC SURGERY | Facility: CLINIC | Age: 66
End: 2021-12-17
Payer: MEDICARE

## 2021-12-17 PROCEDURE — 73502 X-RAY EXAM HIP UNI 2-3 VIEWS: CPT | Mod: 26,LT

## 2021-12-17 PROCEDURE — 99213 OFFICE O/P EST LOW 20 MIN: CPT

## 2021-12-17 NOTE — PHYSICAL EXAM
[LE] : Sensory: Intact in bilateral lower extremities [ALL] : dorsalis pedis, posterior tibial, femoral, popliteal, and radial 2+ and symmetric bilaterally [Antalgic] : not antalgic [de-identified] : General: well-appearing, not in acute distress and not obese. \par GENERAL APPEARANCE: Well nourished and hydrated, pleasant, alert, and oriented x 3. Appears their stated age. \par HEENT: Normocephalic, extraocular eye motion intact. Nasal septum midline. Oral cavity clear. External auditory canal clear. \par CARDIOVASCULAR: No apparent abnormalities. No lower leg edema. No varicosities. Pedal pulses are palpable.\par NEUROLOGIC: Sensation is normal, no muscle weakness in the upper or lower extremities.\par DERMATOLOGIC: No apparent skin lesions, moist, warm, no rash.\par \par MUSCULOSKELETAL: Hands, wrists, and elbows are normal and move freely, shoulders are normal and move freely. \par \par Motor: 5/5 motor strength in bilateral upper & lower extremities. Sensory: Intact in bilateral upper & lower extremities. DTRs: Biceps, brachioradialis, triceps, patellar, ankle and plantar 2+ and symmetric bilaterally. Pulses: dorsalis pedis, posterior tibial, femoral, popliteal, and radial 2+ and symmetric bilaterally. \par  [de-identified] : Left hip exam shows healed incision with no sign of infection. The surgical incision site(s) clean, dry and intact, healed, not erythematous, absent of discharge, not swollen and not dehisced. Additional findings included an unremarkable neurological exam, peripheral vascular exam normal and maneuvers demonstrated a negative Nishant's sign.  [de-identified] :  3V xray of the left hip done in the office today and s/p implants in good positioning with no evidence of wear, loosening, or subsidence. \par

## 2021-12-17 NOTE — DISCUSSION/SUMMARY
[de-identified] : she is 4 M form left revision hip replacement.\par She should continue to do low impact exercises.  she will continue with PT\par continue take ibuprofen for pain management. \par I emphasized to adhere to posterior hip precaution.\par f/u in 1 year form the surgery. \par

## 2021-12-17 NOTE — REASON FOR VISIT
[Follow-Up Visit] : a follow-up visit for [Other: ____] : [unfilled] [FreeTextEntry2] : S/P Left total hip  arthroplasty including acetabular liner and femoral head. 08/24/2021\par

## 2021-12-17 NOTE — HISTORY OF PRESENT ILLNESS
[Pain Location] : pain [5] : a current pain level of 5/10 [de-identified] : pt is here for f/u of right revision RITIKA\par she is 4 M from the sx. \par she feel like the hip is going to dislocate\par she is in PT \par she does not use cane\par he notes pain in the lateral hip and left knee.\par  \par

## 2022-02-03 NOTE — PHYSICAL THERAPY INITIAL EVALUATION ADULT - ASSISTIVE DEVICE FOR TRANSFER: STAND/SIT, REHAB EVAL
Initial SW/CM Assessment/Plan of Care Note     Baseline Assessment  50 year old admitted 2/2/2022 as Observation with a diagnosis of .   Prior to admission patient was living with Spouse/significant other, Children and residing at House.  Patient does not  have a Power of  for Healthcare.  Document is not activated. Patient’s Primary Care Provider is Brenda Capps MD.     Medical History  Past Medical History:   Diagnosis Date   • COVID-19    • PONV (postoperative nausea and vomiting)    • Sinus infection     PCP aware       Prior to Admission Status  Functional Status  Ambulation: Independent/Self  Bathing: Independent/Self  Dressing: Independent/Self  Toileting: Independent/Self  Meal Preparation: Independent/Self  Shopping: Independent/Self  Medication Preparation: Independent/Self  Medication Administration: Independent/Self  Housekeeping: Independent/Self  Laundry: Independent/Self  Transportation: Independent/Self    Agency/Support  Type of Services Prior to Hospitalization: None  Support Systems: Spouse/Significant other, Family members  Home Devices/Equipment: None  Mobility Assist Devices: Cane, Standard walker  Sensory Support Devices: Eyeglasses    Current Status  OT Toileting Tips: Toilets independently, Able toilet in bathroom    Insurance  Primary:  [employer]  Secondary: N/A    Barriers to Discharge  Identified Barriers to Discharge/Transition Planning: Assessment/stabilization in progress    Progress Note  CM met with pt. Discussed discharge needs and plan. Pt states she lives in a 2 story home with her spouse and children. Pt performs IADLs and drives. Pt also states that she has outpt therapy already set up. Spouse will pick her up at time of discharge.     Plan  SW/CM - Recommendations for Discharge:  will continue to monitor  PT - Recommendations for Discharge:      OT - Recommendations for Discharge:   Patient requires  intermittent assistance to perform mobility and/or ADLs  safely    Refer to / Flowsheet for Goals and objective data.      rolling walker

## 2022-03-07 ENCOUNTER — APPOINTMENT (OUTPATIENT)
Dept: ORTHOPEDIC SURGERY | Facility: CLINIC | Age: 67
End: 2022-03-07
Payer: MEDICARE

## 2022-03-07 VITALS
HEART RATE: 71 BPM | DIASTOLIC BLOOD PRESSURE: 82 MMHG | SYSTOLIC BLOOD PRESSURE: 138 MMHG | TEMPERATURE: 98.1 F | HEIGHT: 64 IN | WEIGHT: 150 LBS | BODY MASS INDEX: 25.61 KG/M2

## 2022-03-07 PROCEDURE — 99213 OFFICE O/P EST LOW 20 MIN: CPT

## 2022-03-07 PROCEDURE — 73562 X-RAY EXAM OF KNEE 3: CPT | Mod: LT

## 2022-03-07 NOTE — HISTORY OF PRESENT ILLNESS
[Pain Location] : pain [4] : a current pain level of 4/10 [2] : a minimum pain level of 2/10 [7] : a maximum pain level of 7/10 [Hip Movement] : worsened by hip movement [Walking] : worsened by walking [Rest] : relieved by rest [de-identified] : 68 y/o F presents for follow up of the left knee. She is s/p left TKR 1/19/21. She started having left knee pain after her left hip revision. She states, "I'm at the end of my rope." She is s/p revision left RITIKA 8/24/2021 for a dislocation. Her primary left RITIKA was on 10/1/2020. She is in PT twice a week and has worsening knee pain with internal hip rotation. She is able to walk "straight" when she first starts walking, but the knee eventually "turns inwards" which causes her pain.  [de-identified] : r

## 2022-03-07 NOTE — DISCUSSION/SUMMARY
[de-identified] : The patient is having left knee pain with internal left hip rotation since her left RITIKA revision on 8/24/2021. She is s/p left TKA 1/19/2021. We reviewed all the xrays with the patient and reassured her that she does not require surgical intervention for the knee or the hip. She will continue with PT to help strengthen her muscles and to try to alleviate her symptoms.

## 2022-03-07 NOTE — END OF VISIT
[FreeTextEntry3] : I, Devan Farah, acted solely as a scribe for Dr. German Guerrero on this date 03/07/2022.

## 2022-03-07 NOTE — PHYSICAL EXAM
[LE] : Sensory: Intact in bilateral lower extremities [ALL] : dorsalis pedis, posterior tibial, femoral, popliteal, and radial 2+ and symmetric bilaterally [Normal] : Alert and in no acute distress [Poor Appearance] : well-appearing [de-identified] : GENERAL APPEARANCE: Well nourished and hydrated, pleasant, alert, and oriented x 3. Appears their stated age. \par HEENT: Normocephalic, extraocular eye motion intact. Nasal septum midline. Oral cavity clear. External auditory canal clear. \par RESPIRATORY: Breath sounds clear and audible in all lobes. No wheezing, No accessory muscle use.\par CARDIOVASCULAR: No apparent abnormalities. No lower leg edema. No varicosities. Pedal pulses are palpable.\par NEUROLOGIC: Sensation is normal, no muscle weakness in the upper or lower extremities.\par DERMATOLOGIC: No apparent skin lesions, moist, warm, no rash.\par SPINE: Cervical spine appears normal and moves freely; thoracic spine appears normal and moves freely; lumbosacral spine appears normal and moves freely, normal, nontender.\par MUSCULOSKELETAL: Hands, wrists, and elbows are normal and move freely, shoulders are normal and move freely.  [de-identified] : Left knee exam shows healed incision with no sign of infection. Pain with hip internal rotation\par She circumducts the left leg when walking\par Left hip exam shows healed incision with no sign of infection. [de-identified] : 3V xray of the left knee done in the office today and reviewed by Dr. German Guerrero demonstrates s/p implants in good positioning with no evidence of wear, loosening, or subsidence.

## 2022-05-25 NOTE — H&P ADULT - PROBLEM SELECTOR PROBLEM 5
Elissa Millan is a 61 y.o. female who presents with concern of dizziness. She thinks it is vertigo. Last episode 5 days ago. Had nausea. No focal weakness. Has seen ENT in the past.  Treats allergies with allegra and flonase. Has tried exercises. Was grinding teeth, wears mouth guard. Prior meclizine prn. Has some neck pain. Xray c-spine in 2017 with Cervical spondylosis. Will get hand and arm tingling and numbness. Right arm worse. Saw Dr Adan Uribe, rheumatology. Has Sjogrens and RA     Prior breast cancer. Treated for HTN, on valsartan. BP controlled. Sees Dr Briana Nieto, checked urine recenctly. Past Medical History:   Diagnosis Date    ARF (acute renal failure) ([facility] [address] 3/12/11    as of 8/11/15 pt states has resolved and is not currently being folllowed by specialist    Autoimmune disease ([facility] [address] 2016    SJOURN SYNDROM NEW DX & RA    Cancer ([facility] [address] 7/2015    triple (-) breast ca    Chest pain     Per Pt on Rt side and arm pit only, resolves with Tylenol and states she believes it is related to her tumor    Colon polyp     Dizziness     pt states it is vertigo    Echocardiogram normal 2/19/16    Family hx of colon cancer     History of chemotherapy     AUG 2015- JAN 2016    Ill-defined condition     HAD CHEMO LAST JAN 2016    Ill-defined condition     rhematoid arthritis,sjogren syndrome    Nephrotic syndrome 3/12/2011    biopsy inconclusive but steroid responsive    TMJ (temporomandibular joint disorder)     Toothache     being treated for tooth ache x 1 week.  pain better with treatment       Family History   Problem Relation Age of Onset    Cancer Mother         colon cancer    Hypertension Mother     Kidney Disease Mother         dialysis    Alcohol abuse Father     Stroke Sister     Cancer Brother         bone cancer    Anemia Daughter     Diabetes Brother     Heart Surgery Sister         CABG 3 WAY    Heart Attack Sister     No Known Problems Brother     Anesth Problems Son     Delayed Awakening Son        Social History     Socioeconomic History    Marital status:      Spouse name: Not on file    Number of children: Not on file    Years of education: Not on file    Highest education level: Not on file   Occupational History    Not on file   Tobacco Use    Smoking status: Never Smoker    Smokeless tobacco: Never Used   Vaping Use    Vaping Use: Never used   Substance and Sexual Activity    Alcohol use: No    Drug use: No    Sexual activity: Yes     Partners: Male     Birth control/protection: None   Other Topics Concern    Not on file   Social History Narrative    Not on file     Social Determinants of Health     Financial Resource Strain:     Difficulty of Paying Living Expenses: Not on file   Food Insecurity:     Worried About Running Out of Food in the Last Year: Not on file    Jazmin of Food in the Last Year: Not on file   Transportation Needs:     Lack of Transportation (Medical): Not on file    Lack of Transportation (Non-Medical):  Not on file   Physical Activity:     Days of Exercise per Week: Not on file    Minutes of Exercise per Session: Not on file   Stress:     Feeling of Stress : Not on file   Social Connections:     Frequency of Communication with Friends and Family: Not on file    Frequency of Social Gatherings with Friends and Family: Not on file    Attends Quaker Services: Not on file    Active Member of [address] [place] [address] [employer] or Organizations: Not on file    Attends Club or Organization Meetings: Not on file    Marital Status: Not on file   Intimate Partner Violence:     Fear of Current or Ex-Partner: Not on file    Emotionally Abused: Not on file    Physically Abused: Not on file    Sexually Abused: Not on file   Housing Stability:     Unable to Pay for Housing in the Last Year: Not on file    Number of [place] in the Last Year: Not on file    Unstable Housing in the Last Year: Not on file       Current Outpatient Medications on File Prior to Visit   Medication Sig Dispense Refill    fexofenadine (ALLEGRA) 180 mg tablet Take  by mouth.  cholecalciferol (VITAMIN D3) (1000 Units /25 mcg) tablet Take  by mouth daily.  valsartan (DIOVAN) 160 mg tablet two (2) times a day.  denosumab (Prolia) 60 mg/mL injection 1 mL.  aspirin 81 mg chewable tablet Take 81 mg by mouth daily.  [DISCONTINUED] cetirizine (ZyrTEC) 10 mg tablet Take 10 mg by mouth. (Patient not taking: Reported on 5/25/2022)      [DISCONTINUED] Calcium-Cholecalciferol, D3, 500 mg-10 mcg (400 unit) tab Take  by mouth. No current facility-administered medications on file prior to visit. Review of Systems  Pertinent items are noted in HPI. Objective:     Visit Vitals  /71 (BP 1 Location: Left upper arm, BP Patient Position: Sitting, BP Cuff Size: Adult)   Pulse 64   Temp 97.2 °F (36.2 °C) (Temporal)   Resp 16   Ht 5' 8\" (1.727 m)   Wt 160 lb (72.6 kg)   LMP 04/01/2015   SpO2 100%   BMI 24.33 kg/m²     Gen: well appearing female  HEENT:   PERRL,normal conjunctiva. External ear and canals normal, TMs no opacification or erythema,  OP no erythema, no exudates, MMM  Neck:  Supple. Thyroid normal size, nontender, without nodules. No masses or LAD  Resp:  No wheezing, no rhonchi, no rales. CV:  RRR, normal S1S2, no murmur. GI: soft, nontender, without masses. No hepatosplenomegaly. Extrem:  +2 pulses, no edema, warm distally      Assessment/Plan:       ICD-10-CM ICD-9-CM    1. Vestibular dizziness  H81.90 386.9 meclizine (ANTIVERT) 25 mg tablet   2. Pure hypercholesterolemia  E78.00 272.0 LIPID PANEL      LIPID PANEL   3.  Rheumatoid arthritis, involving unspecified site, unspecified whether rheumatoid factor present (HCC)  M06.9 714.0 COMPLEMENT, C3 & C4      SED RATE (ESR)      C REACTIVE PROTEIN, QT      CBC W/O DIFF      METABOLIC PANEL, COMPREHENSIVE      TSH 3RD GENERATION      COMPLEMENT, C3 & C4      SED RATE (ESR) C REACTIVE PROTEIN, QT      CBC W/O DIFF      METABOLIC PANEL, COMPREHENSIVE      TSH 3RD GENERATION   4. Vitamin D deficiency  E55.9 268.9 VITAMIN D, 25 HYDROXY      VITAMIN D, 25 HYDROXY   5. Cervical spondylosis  M47.812 721.0      Recommend cervical exercises. Resume meclizine prn and do vertigo exercises. Treat allergies.             Talia Denise MD Chronic neck pain

## 2022-07-09 NOTE — DISCHARGE NOTE PROVIDER - NSDCHHBASESERVICE_GEN_ALL_CORE
rn discharge notes





Discharge diagnosis  <Chest pain, unstable angina Abnormal EKG Status post PCI, severe 
disease, involving the midportion of the LAD-treated successfully with drug-eluting, stent 
(7/8/2022).  patient discharge at this time going home self care. med reconciliation, and 
discharge order reviewed, and explained to the patient. patient sign paperwork, belonging 
with the patient.

Discharge instructions  <home, Follow-up with Dr. Avelar next week, take medication as 
prescribed. medication  Brilinta prescription handed to the patient. The patient verbalized 
understanding of care. escorted patient to the lobby for safety. patient  via 
brother. Physical therapy

## 2022-08-19 NOTE — PATIENT PROFILE ADULT - HAS THE PATIENT USED TOBACCO IN THE PAST 30 DAYS?
LABS:                        12.5   10.90 )-----------( 274      ( 19 Aug 2022 10:20 )             38.5     08-19    143  |  111<H>  |  32<H>  ----------------------------<  117<H>  3.8   |  25  |  0.80    Ca    9.2      19 Aug 2022 10:20    TPro  7.2  /  Alb  3.9  /  TBili  0.5  /  DBili  x   /  AST  66<H>  /  ALT  87<H>  /  AlkPhos  153<H>  08-19 No

## 2022-09-02 ENCOUNTER — EMERGENCY (EMERGENCY)
Facility: HOSPITAL | Age: 67
LOS: 1 days | Discharge: HOME | End: 2022-09-02
Attending: EMERGENCY MEDICINE
Payer: MEDICARE

## 2022-09-02 VITALS
DIASTOLIC BLOOD PRESSURE: 100 MMHG | TEMPERATURE: 98 F | OXYGEN SATURATION: 96 % | HEIGHT: 64 IN | SYSTOLIC BLOOD PRESSURE: 141 MMHG | WEIGHT: 145.95 LBS | HEART RATE: 80 BPM | RESPIRATION RATE: 18 BRPM

## 2022-09-02 DIAGNOSIS — Z98.89 OTHER SPECIFIED POSTPROCEDURAL STATES: Chronic | ICD-10-CM

## 2022-09-02 DIAGNOSIS — Z96.642 PRESENCE OF LEFT ARTIFICIAL HIP JOINT: Chronic | ICD-10-CM

## 2022-09-02 DIAGNOSIS — L05.91 PILONIDAL CYST WITHOUT ABSCESS: Chronic | ICD-10-CM

## 2022-09-02 DIAGNOSIS — Z96.659 PRESENCE OF UNSPECIFIED ARTIFICIAL KNEE JOINT: Chronic | ICD-10-CM

## 2022-09-02 PROCEDURE — 11730 AVULSION NAIL PLATE SIMPLE 1: CPT | Mod: F2

## 2022-09-02 PROCEDURE — 99283 EMERGENCY DEPT VISIT LOW MDM: CPT

## 2022-09-02 PROCEDURE — 73130 X-RAY EXAM OF HAND: CPT

## 2022-09-02 PROCEDURE — 99283 EMERGENCY DEPT VISIT LOW MDM: CPT | Mod: FS,25

## 2022-09-02 PROCEDURE — 11730 AVULSION NAIL PLATE SIMPLE 1: CPT

## 2022-09-02 PROCEDURE — 73130 X-RAY EXAM OF HAND: CPT | Mod: 26,LT

## 2022-09-02 RX ORDER — OXYCODONE AND ACETAMINOPHEN 5; 325 MG/1; MG/1
1 TABLET ORAL
Qty: 4 | Refills: 0
Start: 2022-09-02 | End: 2022-09-02

## 2022-09-02 NOTE — ED PROVIDER NOTE - OBJECTIVE STATEMENT
PT with no SPMHx presents to the ED with complaint of Lt middle finger pain that started yesterday. Pt states that she closed a car door on her finger and had a sudden onset of pain after this. Pt states that she has a mild amount of constat pain that is non radiating fells dull in nature, non radiating, that is made worse with activity improved by rest. Pt states that she was seen in [facility] today that dx with open Fx and refereed her to the ED after putting her on Keflex. PT denies fever, chills, numbness tingling, loss of sensation saddle anesthesia, weakness, HE, loss of control of urine, or bowels IVDA.

## 2022-09-02 NOTE — ED PROVIDER NOTE - ATTENDING APP SHARED VISIT CONTRIBUTION OF CARE
pt with injury to finger and finger nail  pe as documented  agree w imaging and wound care  agree w eval and mngt

## 2022-09-02 NOTE — ED PROVIDER NOTE - NSFOLLOWUPINSTRUCTIONS_ED_ALL_ED_FT
Nail Removal    WHAT YOU NEED TO KNOW:    What do I need to know about nail removal? Nail removal can prevent infection, decrease ingrown nail pain, and help the nail heal from an injury. You may need to have all or part of your nail removed.    How do I prepare for nail removal? Your healthcare provider will talk to you about how to prepare for surgery. You may be told not to eat or drink anything after midnight on the day of your surgery. Your provider will tell you which medicines to take or not take on the day of your surgery. You may need to have someone drive you home and stay with you.    What will happen during nail removal?   •You will be given local anesthesia to numb the surgery area. A flat tool will be inserted under your nail to separate it from your skin. If only part of your nail needs to be removed, scissors will be used to cut your nail. Your nail will then be gently removed.      •You may need a matricectomy. This is when part of your nail matrix is destroyed so a small section of your nail stops growing. Your nail matrix is the area that your nail grows from. It is the pale or white color at the base of your nail. Most of the matrix cannot be seen because it is underneath your skin. A chemical, laser, or instrument may be used to destroy the nail matrix.      What will happen after nail removal? Your healthcare provider may put antibiotic ointment and a bandage on your finger or toe. Your provider may want to look at your finger or toe again within 24 hours after your procedure. You may have yellowish drainage for 2 to 6 weeks after your procedure.    What are the risks of nail removal? You may bleed more than expected or develop an infection. You may have damage to surrounding tissue. Your nail may look disfigured or you may have a scar. It make take longer than expected to heal.    CARE AGREEMENT:    You have the right to help plan your care. Learn about your health condition and how it may be treated. Discuss treatment options with your healthcare providers to decide what care you want to receive. You always have the right to refuse treatment.

## 2022-09-02 NOTE — ED ADULT TRIAGE NOTE - MODE OF ARRIVAL
Problem: Adult Inpatient Plan of Care  Goal: Plan of Care Review  Outcome: Ongoing (interventions implemented as appropriate)  Pt on vent as ordered with Q8 xopenex treatments.       Walk in Private Auto

## 2022-09-02 NOTE — ED PROVIDER NOTE - PATIENT PORTAL LINK FT
You can access the FollowMyHealth Patient Portal offered by [facility] by registering at the following website: [email or web address]. By joining [employer]’s FollowMyHealth portal, you will also be able to view your health information using other applications (apps) compatible with our system.

## 2022-09-02 NOTE — ED PROVIDER NOTE - ADDITIONAL NOTES AND INSTRUCTIONS:
PT was evaluated At [facility] ED and was found to have a condition that warranted time of to rest and heal from WORK/SCHOOL.   Carlos Zamora PA-C

## 2022-09-02 NOTE — ED PROVIDER NOTE - CARE PROVIDER_API CALL
Akil Chakraborty (MD)  Plastic Surgery; Surgery of the Hand  [address], [address]  [place] [address]  Phone: [phone]  Fax: [phone]  Follow Up Time:

## 2022-09-02 NOTE — ED PROVIDER NOTE - NS ED ROS FT
ROS: CONTUSIONAL: Denies fever, chills, fatigue, wt loss. HEAD: Denies trauma, HA, Dizziness. EYE: Denies Acute visual changes, diplopia. ENMT: Denies change in hearing, tinnitus, epistaxis, difficulty swallowing, sore throat. CARDIO: Denies CP, palpitations, edema. RESP: Denies Cough, SOB , Diff breathing, hemoptysis. GI: Denies N/V, ABD pain, change in bowel movement. URINARY: Denies difficulty urinating, pelvic pain. MS:  Denies joint pain, back pain, weakness, decreased ROM, swelling. NEURO: Denies change in gait, seizures, loss of sensation, dizziness, confusion LOC.  PSY: NO SI/HI. SKIN: bleeding from nail, Denies Rash, bruising.

## 2022-09-02 NOTE — ED PROVIDER NOTE - CLINICAL SUMMARY MEDICAL DECISION MAKING FREE TEXT BOX
PT with stable VS, no acute distress, non toxic appearing, tolerating PO in the ED, PT with open Fx, will splint dc home on PO meds, nail removed due to being avulsed, wound cleaned primary closer not required, Pt to be dc home with follow up to hand specialist, educated about when to return to the ED if needed. PT verbalizes that he understands all instructions and results. Pt informed that ED is open and available 24/7 365 days a yr, encouraged to return to the ED if they have any change in condition, or feel the need for revaluation.

## 2022-09-03 ENCOUNTER — NON-APPOINTMENT (OUTPATIENT)
Age: 67
End: 2022-09-03

## 2022-09-13 NOTE — H&P ADULT - PROBLEM SELECTOR PLAN 4
Addended by: Lisa Orr on: 9/13/2022 09:32 AM     Modules accepted: Orders C/W home pain meds, ibuprofen and oxycodone  F/U outpatient

## 2022-11-07 ENCOUNTER — RESULT REVIEW (OUTPATIENT)
Age: 67
End: 2022-11-07

## 2022-11-08 ENCOUNTER — APPOINTMENT (OUTPATIENT)
Dept: DERMATOLOGY | Facility: CLINIC | Age: 67
End: 2022-11-08

## 2022-11-08 PROCEDURE — 17110 DESTRUCTION B9 LES UP TO 14: CPT

## 2022-11-08 PROCEDURE — 99213 OFFICE O/P EST LOW 20 MIN: CPT | Mod: 25

## 2022-11-25 NOTE — ED ADULT NURSE NOTE - OBJECTIVE STATEMENT
You can access the FollowMyHealth Patient Portal offered by [facility] by registering at the following website: [email or web address]. By joining [email or web address]’s FollowMyHealth portal, you will also be able to view your health information using other applications (apps) compatible with our system.
sob off and on for several weeks

## 2022-11-26 ENCOUNTER — TRANSCRIPTION ENCOUNTER (OUTPATIENT)
Age: 67
End: 2022-11-26

## 2022-11-26 ENCOUNTER — INPATIENT (INPATIENT)
Facility: HOSPITAL | Age: 67
LOS: 0 days | Discharge: HOME HEALTH | DRG: 561 | End: 2022-11-27
Attending: ORTHOPAEDIC SURGERY | Admitting: ORTHOPAEDIC SURGERY
Payer: MEDICARE

## 2022-11-26 VITALS
RESPIRATION RATE: 16 BRPM | SYSTOLIC BLOOD PRESSURE: 154 MMHG | OXYGEN SATURATION: 96 % | HEART RATE: 92 BPM | TEMPERATURE: 98 F | DIASTOLIC BLOOD PRESSURE: 81 MMHG

## 2022-11-26 DIAGNOSIS — Z98.89 OTHER SPECIFIED POSTPROCEDURAL STATES: Chronic | ICD-10-CM

## 2022-11-26 DIAGNOSIS — Z96.659 PRESENCE OF UNSPECIFIED ARTIFICIAL KNEE JOINT: Chronic | ICD-10-CM

## 2022-11-26 DIAGNOSIS — Z96.642 PRESENCE OF LEFT ARTIFICIAL HIP JOINT: Chronic | ICD-10-CM

## 2022-11-26 DIAGNOSIS — L05.91 PILONIDAL CYST WITHOUT ABSCESS: Chronic | ICD-10-CM

## 2022-11-26 LAB
ANION GAP SERPL CALC-SCNC: 12 MMOL/L — SIGNIFICANT CHANGE UP (ref 5–17)
APTT BLD: 43.4 SEC — HIGH (ref 27.5–35.5)
BASOPHILS # BLD AUTO: 0.03 K/UL — SIGNIFICANT CHANGE UP (ref 0–0.2)
BASOPHILS NFR BLD AUTO: 0.4 % — SIGNIFICANT CHANGE UP (ref 0–2)
BUN SERPL-MCNC: 14.3 MG/DL — SIGNIFICANT CHANGE UP (ref 8–20)
CALCIUM SERPL-MCNC: 9.5 MG/DL — SIGNIFICANT CHANGE UP (ref 8.4–10.5)
CHLORIDE SERPL-SCNC: 101 MMOL/L — SIGNIFICANT CHANGE UP (ref 96–108)
CO2 SERPL-SCNC: 25 MMOL/L — SIGNIFICANT CHANGE UP (ref 22–29)
CREAT SERPL-MCNC: 1.14 MG/DL — SIGNIFICANT CHANGE UP (ref 0.5–1.3)
EGFR: 53 ML/MIN/1.73M2 — LOW
EOSINOPHIL # BLD AUTO: 0.12 K/UL — SIGNIFICANT CHANGE UP (ref 0–0.5)
EOSINOPHIL NFR BLD AUTO: 1.4 % — SIGNIFICANT CHANGE UP (ref 0–6)
GLUCOSE SERPL-MCNC: 114 MG/DL — HIGH (ref 70–99)
HCT VFR BLD CALC: 40.9 % — SIGNIFICANT CHANGE UP (ref 34.5–45)
HGB BLD-MCNC: 14 G/DL — SIGNIFICANT CHANGE UP (ref 11.5–15.5)
IMM GRANULOCYTES NFR BLD AUTO: 0.4 % — SIGNIFICANT CHANGE UP (ref 0–0.9)
INR BLD: 1.02 RATIO — SIGNIFICANT CHANGE UP (ref 0.88–1.16)
LYMPHOCYTES # BLD AUTO: 2.93 K/UL — SIGNIFICANT CHANGE UP (ref 1–3.3)
LYMPHOCYTES # BLD AUTO: 34.2 % — SIGNIFICANT CHANGE UP (ref 13–44)
MCHC RBC-ENTMCNC: 32 PG — SIGNIFICANT CHANGE UP (ref 27–34)
MCHC RBC-ENTMCNC: 34.2 GM/DL — SIGNIFICANT CHANGE UP (ref 32–36)
MCV RBC AUTO: 93.4 FL — SIGNIFICANT CHANGE UP (ref 80–100)
MONOCYTES # BLD AUTO: 0.46 K/UL — SIGNIFICANT CHANGE UP (ref 0–0.9)
MONOCYTES NFR BLD AUTO: 5.4 % — SIGNIFICANT CHANGE UP (ref 2–14)
NEUTROPHILS # BLD AUTO: 5 K/UL — SIGNIFICANT CHANGE UP (ref 1.8–7.4)
NEUTROPHILS NFR BLD AUTO: 58.2 % — SIGNIFICANT CHANGE UP (ref 43–77)
PLATELET # BLD AUTO: 200 K/UL — SIGNIFICANT CHANGE UP (ref 150–400)
POTASSIUM SERPL-MCNC: 4.2 MMOL/L — SIGNIFICANT CHANGE UP (ref 3.5–5.3)
POTASSIUM SERPL-SCNC: 4.2 MMOL/L — SIGNIFICANT CHANGE UP (ref 3.5–5.3)
PROTHROM AB SERPL-ACNC: 11.8 SEC — SIGNIFICANT CHANGE UP (ref 10.5–13.4)
RBC # BLD: 4.38 M/UL — SIGNIFICANT CHANGE UP (ref 3.8–5.2)
RBC # FLD: 12.8 % — SIGNIFICANT CHANGE UP (ref 10.3–14.5)
SODIUM SERPL-SCNC: 138 MMOL/L — SIGNIFICANT CHANGE UP (ref 135–145)
WBC # BLD: 8.57 K/UL — SIGNIFICANT CHANGE UP (ref 3.8–10.5)
WBC # FLD AUTO: 8.57 K/UL — SIGNIFICANT CHANGE UP (ref 3.8–10.5)

## 2022-11-26 PROCEDURE — 99285 EMERGENCY DEPT VISIT HI MDM: CPT

## 2022-11-26 PROCEDURE — 93010 ELECTROCARDIOGRAM REPORT: CPT

## 2022-11-26 PROCEDURE — 73502 X-RAY EXAM HIP UNI 2-3 VIEWS: CPT | Mod: 26,LT

## 2022-11-26 RX ORDER — ACETAMINOPHEN 500 MG
1000 TABLET ORAL ONCE
Refills: 0 | Status: COMPLETED | OUTPATIENT
Start: 2022-11-26 | End: 2022-11-26

## 2022-11-26 RX ORDER — FENTANYL CITRATE 50 UG/ML
50 INJECTION INTRAVENOUS ONCE
Refills: 0 | Status: DISCONTINUED | OUTPATIENT
Start: 2022-11-26 | End: 2022-11-26

## 2022-11-26 RX ORDER — PROPOFOL 10 MG/ML
70 INJECTION, EMULSION INTRAVENOUS ONCE
Refills: 0 | Status: COMPLETED | OUTPATIENT
Start: 2022-11-26 | End: 2022-11-26

## 2022-11-26 RX ORDER — PROPOFOL 10 MG/ML
90 INJECTION, EMULSION INTRAVENOUS ONCE
Refills: 0 | Status: COMPLETED | OUTPATIENT
Start: 2022-11-26 | End: 2022-11-26

## 2022-11-26 RX ADMIN — Medication 400 MILLIGRAM(S): at 23:25

## 2022-11-26 RX ADMIN — FENTANYL CITRATE 50 MICROGRAM(S): 50 INJECTION INTRAVENOUS at 23:46

## 2022-11-26 RX ADMIN — PROPOFOL 90 MILLIGRAM(S): 10 INJECTION, EMULSION INTRAVENOUS at 23:41

## 2022-11-26 RX ADMIN — FENTANYL CITRATE 50 MICROGRAM(S): 50 INJECTION INTRAVENOUS at 22:57

## 2022-11-26 RX ADMIN — PROPOFOL 70 MILLIGRAM(S): 10 INJECTION, EMULSION INTRAVENOUS at 23:34

## 2022-11-26 NOTE — ED PROVIDER NOTE - PHYSICAL EXAMINATION
Constitutional: Uncomfortable appearing, awake, alert, oriented to person, place, and time/situation and in no apparent distress  ENMT: Airway patent nasal mucosa clear. Mouth with normal mucosa. Throat has no vesicles no oropharyngeal exudates and uvula is midline. No blood in the oropharynx  EYES: clear bilaterally, pupils equal, round and reactive to light  Cardiac: Regular rate, regular rhythm. Heart sounds S1, S2. No murmurs, rubs or gallops. Good capillary refill, 2+ pulses, no peripheral edema  Respiratory: Lungs CTAB, no use of accessory muscles, no crackles, satting 95% on RA in no distress  Gastrointestinal: Abdomen nondistended, non-tender, no rebound guarding or peritoneal signs  Genitourinary: No CVA tenderness, pelvis nontender, bladder nondistended  Musculoskeletal: Spine appears normal, range of motion is not limited, no muscle or joint tenderness. L hip ttp laterally, no skin tenting no lacerations or open wound, LLE shortened and internally rotated, 2+ pulses, capillary refill < 2 seconds, sensation reflexes intact, Knee and ankle nontender no obvious deformities   Neurological: Alert and oriented, no focal deficits. CN 2-12 intact, PERRLA, EOMI, No FND  Skin: Skin normal color for race, warm, dry and intact, No evidence of rash

## 2022-11-26 NOTE — H&P ADULT - NSHPPHYSICALEXAM_GEN_ALL_CORE
PHYSICAL EXAM:      Appearance: Alert, responsive, in no acute distress.    Neurological: Sensation is grossly intact to light touch. 5/5 motor function of all extremities. No focal deficits or weaknesses found.    Skin: no rash on visible skin. Skin is clean, dry and intact. No bleeding. No abrasions. No ulcerations.    Vascular: 2+ distal pulses. Cap refill < 2 sec. No signs of venous insufficiency or stasis. No extremity ulcerations. No cyanosis.    Musculoskeletal:           Left Lower Extremity: Positive shortening of leg, positive rotation.  pulse intact, sensation intact. no tenderness to knee or ankle

## 2022-11-26 NOTE — H&P ADULT - NS ATTEND AMEND GEN_ALL_CORE FT
s/p attempted closed reduction. will proceed /w closed vs open reduction. risks/benefits d/w pt. in agreement.

## 2022-11-26 NOTE — ED PROVIDER NOTE - OBJECTIVE STATEMENT
Patient is a 68 yo female with PMHx L hip total arthroplasty in 2020 c/b multiple dislocations now s/p revision in 2021 with Dr. Guerrero presenting with L hip dislocation. Patient is a 66 yo female with PMHx L hip total arthroplasty in 2020 c/b multiple dislocations now s/p revision in 2021 with Dr. Guerrero presenting with L hip dislocation. Patient states she was bending over when she felt her L hip pop out; she has had hip dislocations in the past requiring reduction. Arrives vss, L hip shortened, internally rotated, neurovascularly intact, no FND no signs of trauma. Denies blood thinners, headstrike, LOC, paresthesias, abdominal or back pain. Denies fevers, chills, dizziness, lightheadedness, dysphagia, dysarthria, diplopia, photophobia, syncope, cough, congestion, SOB, CP, abdominal pain, back pain, diarrhea, dysuria, hematuria, hematochezia, hematemesis, n/v, recent travel, sick contacts.

## 2022-11-26 NOTE — ED ADULT NURSE NOTE - OBJECTIVE STATEMENT
Pt. is a 66 yo F who p/w c/o hip injury. Pt. A&Ox4 and amb at baseline. Hx multiple hip replacement surgeries. States she bent over to get something from her dresser and felt a "pop". Endorsing pain to lt., hip. Pain 9/10. +pedal pulses b/l. Neuromuscularly intact. Resp. Equal and unlabored b/l. O2 sat 98% on RA. #18g PIV established to LAC. Comfort measures provided, safety measures implemented, call bell in reach.

## 2022-11-26 NOTE — ED PROVIDER NOTE - NS ED MD DISPO ISOLATION TYPES
Although she does not have a history of COPD/emphysema I suspect she does have this given her physical exam and history of tobacco abuse.  She does have some wheezing but does feel better at this time.  I have reviewed the chest X-ray and it reveals no focal infiltrates or pleural effusions.  She was mildly hypoxic on presentation with an ambient air oxygen saturation of 88%, but that has improved with supplemental oxygen therapy.  Will start frequent nebulized LEE/LAMA; intravenous corticosteroids; and empiric antibiotics.   None

## 2022-11-26 NOTE — ED ADULT NURSE NOTE - PRO INTERPRETER NEED 2
[facility] 2SW-N  Circumcision Procedural Note    Boy Dierdre Cardinal Patient Status:      2021 MRN [id number]   [facility] 2SW-N Attending Cyndi Ennis MD   [facility] Day # 1 PCP No primary care provider on file.      Pre-proc
English

## 2022-11-26 NOTE — H&P ADULT - HISTORY OF PRESENT ILLNESS
Patient is a 67 year old female who is s/p left total hip arthroplasty in 2018 followed by revision with elevated liner in 8/2021 due to recurrent dislocations who presents c/o left hip pain and deformity s/p feeling a pop when bending over to  object from floor.  patient was found to have left dislocated total hip arthroplasty.  patient had multiple attempts at a reduction in ER with unsuccessful reduction.  patient will be admitted for OR.            Imaging Studies:    A/P:  Pt is a  67y Female with found to have     Patient is a 67 year old female who is s/p left total hip arthroplasty in 2018 followed by revision with elevated liner in 8/2021 due to recurrent dislocations who presents c/o left hip pain and deformity s/p feeling a pop when bending over to  object from floor.  patient was found to have left dislocated total hip arthroplasty.  patient had multiple attempts at a reduction in ER with unsuccessful reduction.  patient will be admitted for OR.

## 2022-11-26 NOTE — ED PROVIDER NOTE - ATTENDING CONTRIBUTION TO CARE
ANA Gonsalez: see mdm    I have personally performed a face to face diagnostic evaluation on this patient.  I have reviewed the resident's note and agree with the history, exam, and plan of care, except as noted.   My medical decision making and observations are found above.

## 2022-11-26 NOTE — ED PROVIDER NOTE - PROGRESS NOTE DETAILS
JK - Xray showing dislocated hip, no obvious fractures. Ortho consulted, Hip reduction under procedural sedation attempted; procedure attempted for 18 minutes, 160 mg Propofol, 50 mcg fentanyl given, unable to reduce hip. Patient ready and agreeable to admission to Ortho for operative management. Currently stable.

## 2022-11-26 NOTE — ED PROVIDER NOTE - CLINICAL SUMMARY MEDICAL DECISION MAKING FREE TEXT BOX
Patient is a 66 yo female with PMHx L hip total arthroplasty in 2020 c/b multiple dislocations now s/p revision in 2021 with Dr. Guerrero presenting with L hip dislocation. Patient states she was bending over when she felt her L hip pop out; she has had hip dislocations in the past requiring reduction. Arrives vss, L hip shortened, internally rotated, neurovascularly intact, no FND no signs of trauma, L hip ttp laterally, no skin tenting no lacerations or open wound, LLE shortened and internally rotated, 2+ pulses, capillary refill < 2 seconds, sensation reflexes intact, Knee and ankle nontender no obvious deformities. Will control pain, check labs, obtain xray to r/o fracture vs. dislocation, reassess.

## 2022-11-26 NOTE — ED ADULT TRIAGE NOTE - CHIEF COMPLAINT QUOTE
patient reports bending over behind a dresser, felt her left hip which has dislocated 3 times previously pop out, leg is shortened on ems arrival, no subsequent falls, Alert and oriented to person, place, and time,

## 2022-11-26 NOTE — ED ADULT NURSE NOTE - NSIMPLEMENTINTERV_GEN_ALL_ED
Implemented All Fall with Harm Risk Interventions:  [place] to call system. Call bell, personal items and telephone within reach. Instruct patient to call for assistance. Room bathroom lighting operational. Non-slip footwear when patient is off stretcher. Physically safe environment: no spills, clutter or unnecessary equipment. Stretcher in lowest position, wheels locked, appropriate side rails in place. Provide visual cue, wrist band, yellow gown, etc. Monitor gait and stability. Monitor for mental status changes and reorient to person, place, and time. Review medications for side effects contributing to fall risk. Reinforce activity limits and safety measures with patient and family. Provide visual clues: red socks.

## 2022-11-27 ENCOUNTER — TRANSCRIPTION ENCOUNTER (OUTPATIENT)
Age: 67
End: 2022-11-27

## 2022-11-27 VITALS
DIASTOLIC BLOOD PRESSURE: 87 MMHG | OXYGEN SATURATION: 98 % | HEART RATE: 62 BPM | TEMPERATURE: 98 F | SYSTOLIC BLOOD PRESSURE: 156 MMHG | RESPIRATION RATE: 18 BRPM

## 2022-11-27 DIAGNOSIS — S73.005A UNSPECIFIED DISLOCATION OF LEFT HIP, INITIAL ENCOUNTER: ICD-10-CM

## 2022-11-27 DIAGNOSIS — T84.028A DISLOCATION OF OTHER INTERNAL JOINT PROSTHESIS, INITIAL ENCOUNTER: ICD-10-CM

## 2022-11-27 LAB
BLD GP AB SCN SERPL QL: SIGNIFICANT CHANGE UP
MRSA PCR RESULT.: SIGNIFICANT CHANGE UP
S AUREUS DNA NOSE QL NAA+PROBE: SIGNIFICANT CHANGE UP
SARS-COV-2 RNA SPEC QL NAA+PROBE: SIGNIFICANT CHANGE UP

## 2022-11-27 PROCEDURE — 36415 COLL VENOUS BLD VENIPUNCTURE: CPT

## 2022-11-27 PROCEDURE — 73501 X-RAY EXAM HIP UNI 1 VIEW: CPT

## 2022-11-27 PROCEDURE — 73502 X-RAY EXAM HIP UNI 2-3 VIEWS: CPT

## 2022-11-27 PROCEDURE — 72170 X-RAY EXAM OF PELVIS: CPT

## 2022-11-27 PROCEDURE — 96374 THER/PROPH/DIAG INJ IV PUSH: CPT | Mod: XU

## 2022-11-27 PROCEDURE — 93005 ELECTROCARDIOGRAM TRACING: CPT

## 2022-11-27 PROCEDURE — 86901 BLOOD TYPING SEROLOGIC RH(D): CPT

## 2022-11-27 PROCEDURE — 85025 COMPLETE CBC W/AUTO DIFF WBC: CPT

## 2022-11-27 PROCEDURE — 99223 1ST HOSP IP/OBS HIGH 75: CPT

## 2022-11-27 PROCEDURE — 99156 MOD SED OTH PHYS/QHP 5/>YRS: CPT

## 2022-11-27 PROCEDURE — 85610 PROTHROMBIN TIME: CPT

## 2022-11-27 PROCEDURE — 85730 THROMBOPLASTIN TIME PARTIAL: CPT

## 2022-11-27 PROCEDURE — 87640 STAPH A DNA AMP PROBE: CPT

## 2022-11-27 PROCEDURE — 71045 X-RAY EXAM CHEST 1 VIEW: CPT

## 2022-11-27 PROCEDURE — 27266 TREAT HIP DISLOCATION: CPT | Mod: LT

## 2022-11-27 PROCEDURE — 86900 BLOOD TYPING SEROLOGIC ABO: CPT

## 2022-11-27 PROCEDURE — 96375 TX/PRO/DX INJ NEW DRUG ADDON: CPT | Mod: XU

## 2022-11-27 PROCEDURE — 86850 RBC ANTIBODY SCREEN: CPT

## 2022-11-27 PROCEDURE — U0005: CPT

## 2022-11-27 PROCEDURE — 80048 BASIC METABOLIC PNL TOTAL CA: CPT

## 2022-11-27 PROCEDURE — 87641 MR-STAPH DNA AMP PROBE: CPT

## 2022-11-27 PROCEDURE — 71045 X-RAY EXAM CHEST 1 VIEW: CPT | Mod: 26

## 2022-11-27 PROCEDURE — 99285 EMERGENCY DEPT VISIT HI MDM: CPT | Mod: 25

## 2022-11-27 PROCEDURE — 73502 X-RAY EXAM HIP UNI 2-3 VIEWS: CPT | Mod: 26,LT

## 2022-11-27 PROCEDURE — U0003: CPT

## 2022-11-27 RX ORDER — CEFAZOLIN SODIUM 1 G
2000 VIAL (EA) INJECTION ONCE
Refills: 0 | Status: DISCONTINUED | OUTPATIENT
Start: 2022-11-27 | End: 2022-11-27

## 2022-11-27 RX ORDER — SODIUM CHLORIDE 9 MG/ML
1000 INJECTION INTRAMUSCULAR; INTRAVENOUS; SUBCUTANEOUS
Refills: 0 | Status: DISCONTINUED | OUTPATIENT
Start: 2022-11-27 | End: 2022-11-27

## 2022-11-27 RX ORDER — POVIDONE-IODINE 5 %
1 AEROSOL (ML) TOPICAL ONCE
Refills: 0 | Status: DISCONTINUED | OUTPATIENT
Start: 2022-11-27 | End: 2022-11-27

## 2022-11-27 RX ORDER — ONDANSETRON 8 MG/1
4 TABLET, FILM COATED ORAL ONCE
Refills: 0 | Status: DISCONTINUED | OUTPATIENT
Start: 2022-11-27 | End: 2022-11-27

## 2022-11-27 RX ORDER — OXYCODONE HYDROCHLORIDE 5 MG/1
5 TABLET ORAL ONCE
Refills: 0 | Status: DISCONTINUED | OUTPATIENT
Start: 2022-11-27 | End: 2022-11-27

## 2022-11-27 RX ORDER — ALPRAZOLAM 0.25 MG
1 TABLET ORAL
Qty: 0 | Refills: 0 | DISCHARGE
Start: 2022-11-27

## 2022-11-27 RX ORDER — MUPIROCIN 20 MG/G
1 OINTMENT TOPICAL
Refills: 0 | Status: DISCONTINUED | OUTPATIENT
Start: 2022-11-27 | End: 2022-11-27

## 2022-11-27 RX ORDER — SIMVASTATIN 20 MG/1
40 TABLET, FILM COATED ORAL AT BEDTIME
Refills: 0 | Status: DISCONTINUED | OUTPATIENT
Start: 2022-11-27 | End: 2022-11-27

## 2022-11-27 RX ORDER — FENTANYL CITRATE 50 UG/ML
25 INJECTION INTRAVENOUS
Refills: 0 | Status: DISCONTINUED | OUTPATIENT
Start: 2022-11-27 | End: 2022-11-27

## 2022-11-27 RX ORDER — ALPRAZOLAM 0.25 MG
1 TABLET ORAL THREE TIMES A DAY
Refills: 0 | Status: DISCONTINUED | OUTPATIENT
Start: 2022-11-27 | End: 2022-11-27

## 2022-11-27 RX ORDER — CHLORHEXIDINE GLUCONATE 213 G/1000ML
1 SOLUTION TOPICAL EVERY 12 HOURS
Refills: 0 | Status: DISCONTINUED | OUTPATIENT
Start: 2022-11-27 | End: 2022-11-27

## 2022-11-27 RX ORDER — ACETAMINOPHEN 500 MG
975 TABLET ORAL EVERY 8 HOURS
Refills: 0 | Status: DISCONTINUED | OUTPATIENT
Start: 2022-11-27 | End: 2022-11-27

## 2022-11-27 RX ORDER — MORPHINE SULFATE 50 MG/1
4 CAPSULE, EXTENDED RELEASE ORAL
Refills: 0 | Status: DISCONTINUED | OUTPATIENT
Start: 2022-11-27 | End: 2022-11-27

## 2022-11-27 RX ORDER — QUETIAPINE FUMARATE 200 MG/1
50 TABLET, FILM COATED ORAL AT BEDTIME
Refills: 0 | Status: DISCONTINUED | OUTPATIENT
Start: 2022-11-27 | End: 2022-11-27

## 2022-11-27 RX ORDER — SODIUM CHLORIDE 9 MG/ML
1000 INJECTION, SOLUTION INTRAVENOUS
Refills: 0 | Status: DISCONTINUED | OUTPATIENT
Start: 2022-11-27 | End: 2022-11-27

## 2022-11-27 RX ORDER — ALPRAZOLAM 0.25 MG
0 TABLET ORAL
Qty: 0 | Refills: 0 | DISCHARGE

## 2022-11-27 RX ORDER — HYDROMORPHONE HYDROCHLORIDE 2 MG/ML
0.5 INJECTION INTRAMUSCULAR; INTRAVENOUS; SUBCUTANEOUS
Refills: 0 | Status: DISCONTINUED | OUTPATIENT
Start: 2022-11-27 | End: 2022-11-27

## 2022-11-27 RX ORDER — GABAPENTIN 400 MG/1
100 CAPSULE ORAL AT BEDTIME
Refills: 0 | Status: DISCONTINUED | OUTPATIENT
Start: 2022-11-27 | End: 2022-11-27

## 2022-11-27 RX ADMIN — MUPIROCIN 1 APPLICATION(S): 20 OINTMENT TOPICAL at 05:19

## 2022-11-27 RX ADMIN — Medication 1 MILLIGRAM(S): at 07:56

## 2022-11-27 RX ADMIN — GABAPENTIN 100 MILLIGRAM(S): 400 CAPSULE ORAL at 02:25

## 2022-11-27 RX ADMIN — QUETIAPINE FUMARATE 50 MILLIGRAM(S): 200 TABLET, FILM COATED ORAL at 02:25

## 2022-11-27 RX ADMIN — SODIUM CHLORIDE 100 MILLILITER(S): 9 INJECTION INTRAMUSCULAR; INTRAVENOUS; SUBCUTANEOUS at 00:33

## 2022-11-27 RX ADMIN — Medication 1 MILLIGRAM(S): at 00:32

## 2022-11-27 RX ADMIN — SODIUM CHLORIDE 100 MILLILITER(S): 9 INJECTION INTRAMUSCULAR; INTRAVENOUS; SUBCUTANEOUS at 07:56

## 2022-11-27 RX ADMIN — MORPHINE SULFATE 4 MILLIGRAM(S): 50 CAPSULE, EXTENDED RELEASE ORAL at 00:26

## 2022-11-27 RX ADMIN — MORPHINE SULFATE 4 MILLIGRAM(S): 50 CAPSULE, EXTENDED RELEASE ORAL at 07:56

## 2022-11-27 RX ADMIN — CHLORHEXIDINE GLUCONATE 1 APPLICATION(S): 213 SOLUTION TOPICAL at 05:19

## 2022-11-27 NOTE — DISCHARGE NOTE PROVIDER - NSDCMRMEDTOKEN_GEN_ALL_CORE_FT
ALPRAZolam 1 mg oral tablet: 1 tab(s) orally 3 times a day, As needed, anxiety  QUEtiapine: 800 milligram(s) orally once a day (at bedtime)  simvastatin 40 mg oral tablet: 1 tab(s) orally once a day (at bedtime)

## 2022-11-27 NOTE — ASU DISCHARGE PLAN (ADULT/PEDIATRIC) - DRIVING
Dk Wally discharged to facility accompanied by other Ambulance.   Patient provided with the following educational materials upon discharge:  AVS.   Valuables and belongings sent with patient.   discharge summary, discharge instructions, medications and follow up appointments reviewed  [facility].     Visit Vitals  /71 (BP Location: LUE - Left upper extremity, Patient Position: Semi-Jara's)   Pulse 76   Temp 97.7 °F (36.5 °C) (Oral)   Resp 14   Ht 6' 1.03\" (1.855 m)   Wt 62.8 kg (138 lb 7.2 oz)   SpO2 95%   BMI 18.25 kg/m²         No...

## 2022-11-27 NOTE — DISCHARGE NOTE PROVIDER - NSDCFUADDINST_GEN_ALL_CORE_FT
The patient will be seen in the office between 2-3 weeks for re-evaluation. Patient may shower after discharge. The patient will contact the office if there is development of any redness, has increasing pain, develops bleeding, another injury occurs, or has other concerns. The patient will continue to practice total hip precautions and use of the hip abduction pillow for a minimum of 6 weeks. The patient is to wear knee immobilizer at all times, weight bearing as tolerated to the left lower extremity.

## 2022-11-27 NOTE — PHYSICAL THERAPY INITIAL EVALUATION ADULT - RANGE OF MOTION EXAMINATION, REHAB EVAL
except left hip and knee limited by THPs and knee immobilizer/bilateral upper extremity ROM was WFL (within functional limits)/bilateral lower extremity ROM was WFL (within functional limits)
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
## 2022-11-27 NOTE — DISCHARGE NOTE PROVIDER - CARE PROVIDERS DIRECT ADDRESSES
[email or web address]

## 2022-11-27 NOTE — PHYSICAL THERAPY INITIAL EVALUATION ADULT - WEIGHT-BEARING RESTRICTIONS: GAIT, REHAB EVAL
Problem: Physical Therapy  Goal: Physical Therapy Goal  Outcome: Met    Patient lives with mother in [facility] home with 3 steps to enter. Based on the patient's MMT scores, patient with impaired R side hip flexion strength, and good BLE DF and knee extension strength noted. Patient with impaired static/ dynamic balance and SLS stance times. At this time, patient with good functional mobility and does not require further acute PT services.  DC recommendations home with outpatient PT. Discharge DME includes single point cane.    Left LE/weight-bearing as tolerated

## 2022-11-27 NOTE — PATIENT PROFILE ADULT - FALL HARM RISK - HARM RISK INTERVENTIONS

## 2022-11-27 NOTE — ED PROCEDURE NOTE - CPROC ED MALLAMPATI SCORE1
Medical Clearance-Medical clearance done today. -No outstanding concerns that would preclude surgery.-Patient is cleared to proceed with scheduled surgery.
Class 1: Soft palate, uvula, fauces, pillars visible.

## 2022-11-27 NOTE — DISCHARGE NOTE PROVIDER - NSDCCPTREATMENT_GEN_ALL_CORE_FT
PRINCIPAL PROCEDURE  Procedure: Closed manipulation of left hip joint  Findings and Treatment:

## 2022-11-27 NOTE — CHART NOTE - NSCHARTNOTEFT_GEN_A_CORE
Chart reviewed , patient seen , c/o L hip pain ,   vitals stable , NPO for planned procedure ,   cleared by medicine .   Will follow along with you .

## 2022-11-27 NOTE — PHYSICAL THERAPY INITIAL EVALUATION ADULT - ADDITIONAL COMMENTS
Pt reports living with spouse and brother in a house with 3 GIULIANA c 2 rail, and bedroom on the 2nd level with 8 steps c 2 rail. Pt amb without device and is independent with functional mobility, ADLs, and IADLs. Pt drives and is retired. Pt has support of family. Pt owns RW, SAC, tub bench, shower seat, commode.

## 2022-11-27 NOTE — ASU DISCHARGE PLAN (ADULT/PEDIATRIC) - CARE PROVIDER_API CALL
German Guerrero)  Orthopaedic Surgery  [address]  [place] [address]  Phone: [phone]  Fax: [phone]  Follow Up Time:

## 2022-11-27 NOTE — ASU DISCHARGE PLAN (ADULT/PEDIATRIC) - NS MD DC FALL RISK RISK
For information on Fall & Injury Prevention, visit: [email or web address] OR  [email or web address] OR  https://www.cdc.gov/steadi/patient.html

## 2022-11-27 NOTE — DISCHARGE NOTE PROVIDER - CARE PROVIDER_API CALL
German Guerrero)  Orthopaedic Surgery  [address]  [place] [address]  Phone: [phone]  Fax: [phone]  Follow Up Time:     Andrew Townsend)  Orthopedics  [address] [place] [address]  Phone: [phone]  Fax: [phone]  Follow Up Time:

## 2022-11-27 NOTE — CONSULT NOTE ADULT - ASSESSMENT
67 year old female with PMHx of HLD, TIA, anxiety, chronic back pain, s/p left total hip arthroplasty in 2018 followed by revision with elevated liner in 8/2021 due to recurrent dislocations presented to ED c/o left hip pain found to have dislocated left hip s/p attempted reduction in the ED which was unsuccessful.     Left hip dislocation s/p prior total hip arthroplasty  -recurrent dislocation    -failed attempted reduction in the ED   -planned for OR in the am for reduction   -Pain control as per primary team  -Pt able to perform METs >4, no acute chest, EKG without ischemia or arrhythmia, no acute decompensated HF on exam. Pt with RCRI class II due to hx of TIA, pt is intermediate risk for for planned orthopedic procedure. No acute medical contraindication to planned procedure, pt is medically optimized.     HLD   -cont with home Rosuvastatin     Anxiety   -cont home Quetiapine, xanax    Chronic back pain   -cont home gabapentin    DVT ppx  -as per primary team

## 2022-11-27 NOTE — PHYSICAL THERAPY INITIAL EVALUATION ADULT - PERTINENT HX OF CURRENT PROBLEM, REHAB EVAL
67 year old female who is s/p left total hip arthroplasty in 2018 followed by revision with elevated liner in 8/2021 due to recurrent dislocations who presents c/o left hip pain and deformity s/p feeling a pop when bending over to  object from floor.  patient was found to have left dislocated total hip arthroplasty.  patient had multiple attempts at a reduction in ER with unsuccessful reduction now s/p closed reduction Left hip under sedation

## 2022-11-27 NOTE — ED ADULT NURSE REASSESSMENT NOTE - NS ED NURSE REASSESS COMMENT FT1
Pt. underwent conscious sedation for lt. hip reduction (charted as per flowsheet). Tolerated medications and procedure well. Multiple attempts by ortho PA Juan Jose made to reduce lt. hip were ultimately unsuccessful. Pt. scheduled for OR tomorrow morning. Pt. awake and alert. Responsive and following commands. O2 sat 100% on RA. Resp. equal and unlabored b/l. VSS. NAD. Report given to ESSU JAYE Tompkins. Pt. moved to CDU 1L.

## 2022-11-27 NOTE — DISCHARGE NOTE PROVIDER - HOSPITAL COURSE
Patient was admitted on 11/27/22 for left hip prosthetic dislocation. Patient had closed reduction attempted in the ED, unsuccessful. Patient was admitted and taken to operating room for closed reduction under sedation by Dr Townsend. Patient was recovered in the PACU without complications. Patient was discharged in stable condition.

## 2022-11-27 NOTE — CONSULT NOTE ADULT - REASON FOR ADMISSION
HPI     Dls: 1/13/16 Dr. Vicente     Pt c/o blurry vision od in am as the day goes on clears up. Pt wears   pal's. Pt states no tearing no itching no burning no pain.     Eye meds:   systane ou qd    Family OHx  (+) glaucoma - mom  (--) AMD         Last edited by Gayle Vela, OD on 1/13/2017 10:25 AM.       Assessment /Plan     For exam results, see Encounter Report.    Nuclear sclerosis, bilateral  - Educated pt on presence of cataracts and effects on vision. No surgery at this time. Recheck in one year.    Dry eye syndrome, bilateral  - Recommend artificial tears. 1 drop 4x per day and PRN. Chronicity of disease and treatment discussed.    S/P LASIK (laser assisted in situ keratomileusis) of both eyes  - recommend AT for dryness    Refractive error  - Educated patient on refractive error and discussed lens options. Gave pt Rx for specs. RTC in 1 year.    Eyeglass Final Rx     Eyeglass Final Rx      Sphere Cylinder Axis Add   Right -1.50 +2.00 180 +2.50   Left +1.00 +0.75 120 +2.50       Expiration Date:  1/14/2018    Comments:  OPPOSITE SIGNS                           
Left hip dislocation
detailed exam
warm and dry

## 2022-12-05 ENCOUNTER — APPOINTMENT (OUTPATIENT)
Dept: ORTHOPEDIC SURGERY | Facility: CLINIC | Age: 67
End: 2022-12-05

## 2022-12-05 VITALS — BODY MASS INDEX: 25.61 KG/M2 | WEIGHT: 150 LBS | HEIGHT: 64 IN

## 2022-12-05 VITALS — HEART RATE: 81 BPM | DIASTOLIC BLOOD PRESSURE: 88 MMHG | SYSTOLIC BLOOD PRESSURE: 138 MMHG

## 2022-12-05 DIAGNOSIS — S73.005D UNSPECIFIED DISLOCATION OF LEFT HIP, SUBSEQUENT ENCOUNTER: ICD-10-CM

## 2022-12-05 PROCEDURE — 73502 X-RAY EXAM HIP UNI 2-3 VIEWS: CPT

## 2022-12-05 PROCEDURE — 99214 OFFICE O/P EST MOD 30 MIN: CPT

## 2022-12-23 NOTE — END OF VISIT
[FreeTextEntry3] : I, Ashley Avendano, acted solely as a scribe for Dr. German Guerrero on 12/05/2022

## 2022-12-23 NOTE — PHYSICAL EXAM
[LE] : Sensory: Intact in bilateral lower extremities [ALL] : dorsalis pedis, posterior tibial, femoral, popliteal, and radial 2+ and symmetric bilaterally [Normal] : Alert and in no acute distress [Poor Appearance] : well-appearing [de-identified] : GENERAL APPEARANCE: Well nourished and hydrated, pleasant, alert, and oriented x 3. Appears their stated age. \par HEENT: Normocephalic, extraocular eye motion intact. Nasal septum midline. Oral cavity clear. External auditory canal clear. \par RESPIRATORY: Breath sounds clear and audible in all lobes. No wheezing, No accessory muscle use.\par CARDIOVASCULAR: No apparent abnormalities. No lower leg edema. No varicosities. Pedal pulses are palpable.\par NEUROLOGIC: Sensation is normal, no muscle weakness in the upper or lower extremities.\par DERMATOLOGIC: No apparent skin lesions, moist, warm, no rash.\par SPINE: Cervical spine appears normal and moves freely; thoracic spine appears normal and moves freely; lumbosacral spine appears normal and moves freely, normal, nontender.\par MUSCULOSKELETAL: Hands, wrists, and elbows are normal and move freely, shoulders are normal and move freely.  [de-identified] : Left hip exam shows healed incision with no sign of infection. [de-identified] : 3V xray of the left hip done in the office today and reviewed by Dr. German Guerrero demonstrates s/p implants in good positioning with no evidence of wear, loosening, or subsidence.

## 2022-12-23 NOTE — REVIEW OF SYSTEMS
[Joint Pain] : joint pain [Joint Stiffness] : joint stiffness [Joint Swelling] : joint swelling [Negative] : Heme/Lymph [FreeTextEntry9] : left hip

## 2022-12-23 NOTE — HISTORY OF PRESENT ILLNESS
[Pain Location] : pain [4] : a current pain level of 4/10 [2] : a minimum pain level of 2/10 [7] : a maximum pain level of 7/10 [Hip Movement] : worsened by hip movement [Walking] : worsened by walking [Rest] : relieved by rest [Stable] : stable [de-identified] : 68 y/o F presents for f/u of the left hip.  She is s/p revision left RITIKA 8/24/2021 for a dislocation. Her primary left RITIKA was on 10/1/2020. She recently had a repeat dislocation on her hip after pending down and they tried reducing her hip unsuccessfully in the ER. SHe was brought to the OR and under general anesthesia, Dr. Townsend was able to reduce her hip. This was thanksgiving weekend. She is here for a f/u visit post op. She feels pain currently on the lateral side and she feels she is afraid of doing anything. She fears repeat dislocation. She has seeing pain management and is on Morphine. She is s/p left TKA 1/19/2021 and is doing well. \par

## 2022-12-23 NOTE — DISCUSSION/SUMMARY
[Medication Risks Reviewed] : Medication risks reviewed [Surgical risks reviewed] : Surgical risks reviewed [de-identified] : The patient is here with a recurrent left hip dislocation RITIKA revision on 8/24/2021. She recently had a dislocation that was reduced in the OR by Dr. Towsnend and failed attempt at reduction in the ED.  She feels she is scared of a repeat dislocation and she is in a lot of pain currently. We discussed the option of a constrained liner or revision Acetabular component The advantages and disadvantages of the surgery was discussed. She understands the procedure would limit her ROM. The pt is insistent on reoperation because she does not feel safe with her current hip and is afraid of reoccurring dislocation. She will talk with the surgical coordinators. Patient will continue to adhere to the posterior hip precautions.	\par \par The patient is a 67 year individual with a failing left RITIKA from recurrent dislocation.I have recommended a revision left total hip arthroplasty for this patient. A long discussion took place with the patient describing what a total joint replacement is and what the procedure would entail. A total knee arthroplasty model, similar to the implant that will be used during the operation, was utilized to demonstrate and to discuss the various bearing surfaces of the implants. The hospitalization and post-operative care and rehabilitation were also discussed. The use of perioperative antibiotics and DVT prophylaxis were discussed. The risk, benefits and alternatives to a surgical intervention were discussed at length with the patient. The patient was also advised of risks related to the medical comorbidities, elevated body mass index (BMI), and smoking where applicable. We discussed how to reduce modifiable risk factors and encouraged smoking cessation were applicable. A lengthy discussion took place to review the most common complications including but not limited to: deep vein thrombosis, pulmonary embolus, heart attack, stroke, infection, wound breakdown, numbness, damage to nerves, tendon, muscles, arteries or other blood vessels, death and other possible complications from anesthesia. The patient was told that we will take steps to minimize these risks by using sterile technique, antibiotics and DVT prophylaxis when appropriate and follow the patient postoperatively in the office setting to monitor progress. The possibility of recurrent pain, no improvement in pain and actual worsening of pain were also discussed with the patient.\par The discharge plan of care focused on the patient going home following surgery. The patient was encouraged to make the necessary arrangements to have someone stay with them when they are discharged home. Following discharge, a home care nurse will visit the patient. The home care nurse will open your home care case and request home physical therapy services. Home physical therapy will commence following discharge provided it is appropriate and covered by the health insurance benefit plan. \par The benefits of surgery were discussed with the patient including the potential for improving his/her current clinical condition through operative intervention. Alternatives to surgical intervention including continued conservative management were also discussed in detail. All questions were answered to the satisfaction of the patient. The treatment plan of care, as well as a model of a total knee arthroplasty equivalent to the one that will be used for their total joint replacement, was shared with the patient. The patient agreed to the plan of care as well as the use of implants in their total joint replacement.

## 2022-12-27 ENCOUNTER — OUTPATIENT (OUTPATIENT)
Dept: OUTPATIENT SERVICES | Facility: HOSPITAL | Age: 67
LOS: 1 days | End: 2022-12-27
Payer: MEDICARE

## 2022-12-27 VITALS
OXYGEN SATURATION: 97 % | SYSTOLIC BLOOD PRESSURE: 130 MMHG | WEIGHT: 160.06 LBS | TEMPERATURE: 98 F | HEART RATE: 94 BPM | HEIGHT: 63 IN | RESPIRATION RATE: 16 BRPM | DIASTOLIC BLOOD PRESSURE: 88 MMHG

## 2022-12-27 DIAGNOSIS — T84.013A BROKEN INTERNAL LEFT KNEE PROSTHESIS, INITIAL ENCOUNTER: ICD-10-CM

## 2022-12-27 DIAGNOSIS — Z98.89 OTHER SPECIFIED POSTPROCEDURAL STATES: Chronic | ICD-10-CM

## 2022-12-27 DIAGNOSIS — Z98.890 OTHER SPECIFIED POSTPROCEDURAL STATES: Chronic | ICD-10-CM

## 2022-12-27 DIAGNOSIS — Q21.12 PATENT FORAMEN OVALE: Chronic | ICD-10-CM

## 2022-12-27 DIAGNOSIS — Z29.9 ENCOUNTER FOR PROPHYLACTIC MEASURES, UNSPECIFIED: ICD-10-CM

## 2022-12-27 DIAGNOSIS — Z96.642 PRESENCE OF LEFT ARTIFICIAL HIP JOINT: Chronic | ICD-10-CM

## 2022-12-27 DIAGNOSIS — U07.1 COVID-19: ICD-10-CM

## 2022-12-27 DIAGNOSIS — L05.91 PILONIDAL CYST WITHOUT ABSCESS: Chronic | ICD-10-CM

## 2022-12-27 DIAGNOSIS — Z96.659 PRESENCE OF UNSPECIFIED ARTIFICIAL KNEE JOINT: Chronic | ICD-10-CM

## 2022-12-27 DIAGNOSIS — Z01.818 ENCOUNTER FOR OTHER PREPROCEDURAL EXAMINATION: ICD-10-CM

## 2022-12-27 DIAGNOSIS — M19.90 UNSPECIFIED OSTEOARTHRITIS, UNSPECIFIED SITE: ICD-10-CM

## 2022-12-27 LAB
A1C WITH ESTIMATED AVERAGE GLUCOSE RESULT: 5.5 % — SIGNIFICANT CHANGE UP (ref 4–5.6)
ALBUMIN SERPL ELPH-MCNC: 4.5 G/DL — SIGNIFICANT CHANGE UP (ref 3.3–5.2)
ALP SERPL-CCNC: 87 U/L — SIGNIFICANT CHANGE UP (ref 40–120)
ALT FLD-CCNC: 11 U/L — SIGNIFICANT CHANGE UP
ANION GAP SERPL CALC-SCNC: 12 MMOL/L — SIGNIFICANT CHANGE UP (ref 5–17)
APTT BLD: 43.3 SEC — HIGH (ref 27.5–35.5)
AST SERPL-CCNC: 18 U/L — SIGNIFICANT CHANGE UP
BASOPHILS # BLD AUTO: 0.03 K/UL — SIGNIFICANT CHANGE UP (ref 0–0.2)
BASOPHILS NFR BLD AUTO: 0.4 % — SIGNIFICANT CHANGE UP (ref 0–2)
BILIRUB SERPL-MCNC: 0.3 MG/DL — LOW (ref 0.4–2)
BLD GP AB SCN SERPL QL: SIGNIFICANT CHANGE UP
BUN SERPL-MCNC: 13.2 MG/DL — SIGNIFICANT CHANGE UP (ref 8–20)
CALCIUM SERPL-MCNC: 9.7 MG/DL — SIGNIFICANT CHANGE UP (ref 8.4–10.5)
CHLORIDE SERPL-SCNC: 101 MMOL/L — SIGNIFICANT CHANGE UP (ref 96–108)
CO2 SERPL-SCNC: 28 MMOL/L — SIGNIFICANT CHANGE UP (ref 22–29)
CREAT SERPL-MCNC: 0.9 MG/DL — SIGNIFICANT CHANGE UP (ref 0.5–1.3)
EGFR: 70 ML/MIN/1.73M2 — SIGNIFICANT CHANGE UP
EOSINOPHIL # BLD AUTO: 0.07 K/UL — SIGNIFICANT CHANGE UP (ref 0–0.5)
EOSINOPHIL NFR BLD AUTO: 0.9 % — SIGNIFICANT CHANGE UP (ref 0–6)
ESTIMATED AVERAGE GLUCOSE: 111 MG/DL — SIGNIFICANT CHANGE UP (ref 68–114)
GLUCOSE SERPL-MCNC: 139 MG/DL — HIGH (ref 70–99)
HCT VFR BLD CALC: 41.5 % — SIGNIFICANT CHANGE UP (ref 34.5–45)
HGB BLD-MCNC: 13.4 G/DL — SIGNIFICANT CHANGE UP (ref 11.5–15.5)
IMM GRANULOCYTES NFR BLD AUTO: 0.4 % — SIGNIFICANT CHANGE UP (ref 0–0.9)
INR BLD: 1.02 RATIO — SIGNIFICANT CHANGE UP (ref 0.88–1.16)
LYMPHOCYTES # BLD AUTO: 2.26 K/UL — SIGNIFICANT CHANGE UP (ref 1–3.3)
LYMPHOCYTES # BLD AUTO: 30.3 % — SIGNIFICANT CHANGE UP (ref 13–44)
MCHC RBC-ENTMCNC: 30.7 PG — SIGNIFICANT CHANGE UP (ref 27–34)
MCHC RBC-ENTMCNC: 32.3 GM/DL — SIGNIFICANT CHANGE UP (ref 32–36)
MCV RBC AUTO: 95.2 FL — SIGNIFICANT CHANGE UP (ref 80–100)
MONOCYTES # BLD AUTO: 0.34 K/UL — SIGNIFICANT CHANGE UP (ref 0–0.9)
MONOCYTES NFR BLD AUTO: 4.6 % — SIGNIFICANT CHANGE UP (ref 2–14)
NEUTROPHILS # BLD AUTO: 4.74 K/UL — SIGNIFICANT CHANGE UP (ref 1.8–7.4)
NEUTROPHILS NFR BLD AUTO: 63.4 % — SIGNIFICANT CHANGE UP (ref 43–77)
PLATELET # BLD AUTO: 215 K/UL — SIGNIFICANT CHANGE UP (ref 150–400)
POTASSIUM SERPL-MCNC: 4.3 MMOL/L — SIGNIFICANT CHANGE UP (ref 3.5–5.3)
POTASSIUM SERPL-SCNC: 4.3 MMOL/L — SIGNIFICANT CHANGE UP (ref 3.5–5.3)
PROT SERPL-MCNC: 7.1 G/DL — SIGNIFICANT CHANGE UP (ref 6.6–8.7)
PROTHROM AB SERPL-ACNC: 11.8 SEC — SIGNIFICANT CHANGE UP (ref 10.5–13.4)
RBC # BLD: 4.36 M/UL — SIGNIFICANT CHANGE UP (ref 3.8–5.2)
RBC # FLD: 12.5 % — SIGNIFICANT CHANGE UP (ref 10.3–14.5)
SODIUM SERPL-SCNC: 141 MMOL/L — SIGNIFICANT CHANGE UP (ref 135–145)
WBC # BLD: 7.47 K/UL — SIGNIFICANT CHANGE UP (ref 3.8–10.5)
WBC # FLD AUTO: 7.47 K/UL — SIGNIFICANT CHANGE UP (ref 3.8–10.5)

## 2022-12-27 PROCEDURE — G0463: CPT

## 2022-12-27 RX ORDER — QUETIAPINE FUMARATE 200 MG/1
800 TABLET, FILM COATED ORAL
Qty: 0 | Refills: 0 | DISCHARGE
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
## 2022-12-27 NOTE — H&P PST ADULT - NSICDXPASTSURGICALHX_GEN_ALL_CORE_FT
PAST SURGICAL HISTORY:  H/O neck surgery     H/O total knee replacement 2015- Right knee, left knee    PFO (patent foramen ovale)     Pilonidal cyst     S/P dilatation and curettage x2    S/P hip replacement, left     S/P knee surgery right x5 meniscus     PAST SURGICAL HISTORY:  H/O melanoma excision     H/O neck surgery     H/O total knee replacement 2015- Right knee, left knee    PFO (patent foramen ovale)     Pilonidal cyst     S/P dilatation and curettage x2    S/P hip replacement, left     S/P knee surgery right x5 meniscus

## 2022-12-27 NOTE — H&P PST ADULT - OPHTHALMOLOGIC
[id number] [address] EMERGENCY DEPT 
[id number] [facility] 
[place] [id number] [phone] Work/School Note Date: 2/10/2020 To Whom It May concern: 
 
Nehemiah Landon was seen and treated today in the emergency room by the following provider(s): 
No providers found. Nehemiah Landon may return to work on 02/12/2020.  
 
Sincerely, 
 
 
 
 
Payton Muñiz RN 
 
 
 
 negative

## 2022-12-27 NOTE — H&P PST ADULT - HISTORY OF PRESENT ILLNESS
67 yr old female with history of  anxiety , neck pain, back pain , melanoma and arthritis . pt  presents s/p left hip replacement 2020 since then pt c/o multiple dislocations. Pt had surgery / revision in 8/2021 . LAst dislocation in NOv 2022. 0/10 pain today. Ambulates without assistance. Pt is now scheduled for revision of left hip on 1/6/22 with Dr. Guerrero.

## 2022-12-27 NOTE — H&P PST ADULT - ASSESSMENT
67 yr old female with history of  anxiety , neck pain, back pain , melanoma and arthritis . pt  presents s/p left hip replacement  since then pt c/o multiple dislocations. Pt had surgery / revision in 2021 . LAst dislocation in 2022. 0/10 pain today. Ambulates without assistance. Pt is now scheduled for revision of left hip on 22 with Dr. Guerrero. medical clearance and pcr for covid to be obtained pre op.  Patient was given information on joint class, joint book provided, ERP protocol reviewed with patient, MSSA/MRSA swabbed in PST, results pending  OPIOID RISK TOOL    MIYA EACH BOX THAT APPLIES AND ADD TOTALS AT THE END    FAMILY HISTORY OF SUBSTANCE ABUSE                 FEMALE         MALE                                                Alcohol                             [  ]1 pt          [  ]3pts                                               Illegal Durgs                     [  ]2 pts        [  ]3pts                                               Rx Drugs                           [  ]4 pts        [  ]4 pts    PERSONAL HISTORY OF SUBSTANCE ABUSE                                                                                          Alcohol                             [  ]3 pts       [  ]3 pts                                               Illegal Durgs                     [  ]4 pts        [  ]4 pts                                               Rx Drugs                           [  ]5 pts        [  ]5 pts    AGE BETWEEN 16-45 YEARS                                      [  ]1 pt         [  ]1 pt    HISTORY OF PREADOLESCENT   SEXUAL ABUSE                                                             [  ]3 pts        [  ]0pts    PSYCHOLOGICAL DISEASE                     ADD, OCD, Bipolar, Schizophrenia        [  ]2 pts         [  ]2 pts                      Depression                                               [  ]1 pt           [  ]1 pt           SCORING TOTAL   (add numbers and type here)              (0***)                                     A score of 3 or lower indicated LOW risk for future opiod abuse  A score of 4 to 7 indicated moderate risk for future opiod abuse  A score of 8 or higher indicates a high risk for opiod abuse  CAPRINI VTE 2.0 SCORE [CLOT updated 2019]    AGE RELATED RISK FACTORS                                                       MOBILITY RELATED FACTORS  [ ] Age 41-60 years                                            (1 Point)                    [ ] Bed rest                                                        (1 Point)  [X ] Age: 61-74 years                                           (2 Points)                  [ ] Plaster cast                                                   (2 Points)  [ ] Age= 75 years                                              (3 Points)                    [ ] Bed bound for more than 72 hours                 (2 Points)    DISEASE RELATED RISK FACTORS                                               GENDER SPECIFIC FACTORS  [ ] Edema in the lower extremities                       (1 Point)              [ ] Pregnancy                                                     (1 Point)  [ ] Varicose veins                                               (1 Point)                     [ ] Post-partum < 6 weeks                                   (1 Point)             [ X] BMI > 25 Kg/m2                                            (1 Point)                     [ ] Hormonal therapy  or oral contraception          (1 Point)                 [ ] Sepsis (in the previous month)                        (1 Point)               [ ] History of pregnancy complications                 (1 point)  [ ] Pneumonia or serious lung disease                                               [ ] Unexplained or recurrent                     (1 Point)           (in the previous month)                               (1 Point)  [ ] Abnormal pulmonary function test                     (1 Point)                 SURGERY RELATED RISK FACTORS  [ ] Acute myocardial infarction                              (1 Point)               [ ]  Section                                             (1 Point)  [ ] Congestive heart failure (in the previous month)  (1 Point)      [ ] Minor surgery                                                  (1 Point)   [ ] Inflammatory bowel disease                             (1 Point)               [ ] Arthroscopic surgery                                        (2 Points)  [ ] Central venous access                                      (2 Points)                [ ] General surgery lasting more than 45 minutes (2 points)  [ X] Malignancy- Present or previous                   (2 Points)                [X ] Elective arthroplasty                                         (5 points)    [ ] Stroke (in the previous month)                          (5 Points)                                                                                                                                                           HEMATOLOGY RELATED FACTORS                                                 TRAUMA RELATED RISK FACTORS  [ ] Prior episodes of VTE                                     (3 Points)                [ ] Fracture of the hip, pelvis, or leg                       (5 Points)  [ ] Positive family history for VTE                         (3 Points)             [ ] Acute spinal cord injury (in the previous month)  (5 Points)  [ ] Prothrombin 30712 A                                     (3 Points)               [ ] Paralysis  (less than 1 month)                             (5 Points)  [ ] Factor V Leiden                                             (3 Points)                  [ ] Multiple Trauma within 1 month                        (5 Points)  [ ] Lupus anticoagulants                                     (3 Points)                                                           [ ] Anticardiolipin antibodies                               (3 Points)                                                       [ ] High homocysteine in the blood                      (3 Points)                                             [ ] Other congenital or acquired thrombophilia      (3 Points)                                                [ ] Heparin induced thrombocytopenia                  (3 Points)                                     Total Score [      10    ]

## 2022-12-27 NOTE — H&P PST ADULT - NSICDXPROCEDURE_GEN_ALL_CORE_FT
PROCEDURES:  Open revision of external fixation device in left hip joint 27-Dec-2022 15:35:45 revision of left hip joint replacement D'Amico, Karyn L

## 2022-12-28 LAB
MRSA PCR RESULT.: DETECTED
S AUREUS DNA NOSE QL NAA+PROBE: DETECTED

## 2022-12-28 RX ORDER — MUPIROCIN 20 MG/G
1 OINTMENT TOPICAL
Qty: 1 | Refills: 0
Start: 2022-12-28 | End: 2023-01-01

## 2023-01-05 ENCOUNTER — TRANSCRIPTION ENCOUNTER (OUTPATIENT)
Age: 68
End: 2023-01-05

## 2023-01-06 ENCOUNTER — TRANSCRIPTION ENCOUNTER (OUTPATIENT)
Age: 68
End: 2023-01-06

## 2023-01-06 ENCOUNTER — APPOINTMENT (OUTPATIENT)
Dept: ORTHOPEDIC SURGERY | Facility: HOSPITAL | Age: 68
End: 2023-01-06

## 2023-01-06 ENCOUNTER — INPATIENT (INPATIENT)
Facility: HOSPITAL | Age: 68
LOS: 1 days | Discharge: HOME HEALTH | DRG: 468 | End: 2023-01-08
Attending: ORTHOPAEDIC SURGERY | Admitting: ORTHOPAEDIC SURGERY
Payer: MEDICARE

## 2023-01-06 VITALS
TEMPERATURE: 98 F | HEIGHT: 64 IN | WEIGHT: 151.9 LBS | SYSTOLIC BLOOD PRESSURE: 139 MMHG | OXYGEN SATURATION: 98 % | DIASTOLIC BLOOD PRESSURE: 86 MMHG | RESPIRATION RATE: 16 BRPM | HEART RATE: 73 BPM

## 2023-01-06 DIAGNOSIS — L05.91 PILONIDAL CYST WITHOUT ABSCESS: Chronic | ICD-10-CM

## 2023-01-06 DIAGNOSIS — Z96.642 PRESENCE OF LEFT ARTIFICIAL HIP JOINT: Chronic | ICD-10-CM

## 2023-01-06 DIAGNOSIS — T84.013A BROKEN INTERNAL LEFT KNEE PROSTHESIS, INITIAL ENCOUNTER: ICD-10-CM

## 2023-01-06 DIAGNOSIS — Z96.659 PRESENCE OF UNSPECIFIED ARTIFICIAL KNEE JOINT: Chronic | ICD-10-CM

## 2023-01-06 DIAGNOSIS — Z98.890 OTHER SPECIFIED POSTPROCEDURAL STATES: Chronic | ICD-10-CM

## 2023-01-06 DIAGNOSIS — Z98.89 OTHER SPECIFIED POSTPROCEDURAL STATES: Chronic | ICD-10-CM

## 2023-01-06 DIAGNOSIS — Q21.12 PATENT FORAMEN OVALE: Chronic | ICD-10-CM

## 2023-01-06 LAB
APTT BLD: 43.6 SEC — HIGH (ref 27.5–35.5)
INR BLD: 1 RATIO — SIGNIFICANT CHANGE UP (ref 0.88–1.16)
PROTHROM AB SERPL-ACNC: 11.6 SEC — SIGNIFICANT CHANGE UP (ref 10.5–13.4)

## 2023-01-06 PROCEDURE — 73502 X-RAY EXAM HIP UNI 2-3 VIEWS: CPT | Mod: 26,LT

## 2023-01-06 PROCEDURE — 99222 1ST HOSP IP/OBS MODERATE 55: CPT

## 2023-01-06 PROCEDURE — 27134 REVISE HIP JOINT REPLACEMENT: CPT | Mod: 52,58,LT

## 2023-01-06 DEVICE — SCREW BONE SLF TAP 6.5X40MM: Type: IMPLANTABLE DEVICE | Site: LEFT | Status: FUNCTIONAL

## 2023-01-06 DEVICE — LINER VIT E CONTINUUM ELEVATED 36X52MM: Type: IMPLANTABLE DEVICE | Site: LEFT | Status: FUNCTIONAL

## 2023-01-06 DEVICE — IMPLANTABLE DEVICE: Type: IMPLANTABLE DEVICE | Site: LEFT | Status: FUNCTIONAL

## 2023-01-06 DEVICE — SCREW SELF TAP 6.5X30MM: Type: IMPLANTABLE DEVICE | Site: LEFT | Status: FUNCTIONAL

## 2023-01-06 RX ORDER — MAGNESIUM HYDROXIDE 400 MG/1
30 TABLET, CHEWABLE ORAL DAILY
Refills: 0 | Status: DISCONTINUED | OUTPATIENT
Start: 2023-01-06 | End: 2023-01-08

## 2023-01-06 RX ORDER — KETOROLAC TROMETHAMINE 30 MG/ML
15 SYRINGE (ML) INJECTION EVERY 6 HOURS
Refills: 0 | Status: DISCONTINUED | OUTPATIENT
Start: 2023-01-06 | End: 2023-01-07

## 2023-01-06 RX ORDER — SODIUM CHLORIDE 9 MG/ML
3 INJECTION INTRAMUSCULAR; INTRAVENOUS; SUBCUTANEOUS EVERY 8 HOURS
Refills: 0 | Status: DISCONTINUED | OUTPATIENT
Start: 2023-01-06 | End: 2023-01-06

## 2023-01-06 RX ORDER — SIMVASTATIN 20 MG/1
40 TABLET, FILM COATED ORAL AT BEDTIME
Refills: 0 | Status: DISCONTINUED | OUTPATIENT
Start: 2023-01-06 | End: 2023-01-08

## 2023-01-06 RX ORDER — SODIUM CHLORIDE 9 MG/ML
500 INJECTION INTRAMUSCULAR; INTRAVENOUS; SUBCUTANEOUS ONCE
Refills: 0 | Status: COMPLETED | OUTPATIENT
Start: 2023-01-06 | End: 2023-01-06

## 2023-01-06 RX ORDER — ONDANSETRON 8 MG/1
4 TABLET, FILM COATED ORAL ONCE
Refills: 0 | Status: DISCONTINUED | OUTPATIENT
Start: 2023-01-06 | End: 2023-01-06

## 2023-01-06 RX ORDER — SODIUM CHLORIDE 9 MG/ML
1000 INJECTION INTRAMUSCULAR; INTRAVENOUS; SUBCUTANEOUS
Refills: 0 | Status: DISCONTINUED | OUTPATIENT
Start: 2023-01-06 | End: 2023-01-08

## 2023-01-06 RX ORDER — HYDROMORPHONE HYDROCHLORIDE 2 MG/ML
4 INJECTION INTRAMUSCULAR; INTRAVENOUS; SUBCUTANEOUS
Refills: 0 | Status: DISCONTINUED | OUTPATIENT
Start: 2023-01-06 | End: 2023-01-08

## 2023-01-06 RX ORDER — TRANEXAMIC ACID 100 MG/ML
1000 INJECTION, SOLUTION INTRAVENOUS ONCE
Refills: 0 | Status: DISCONTINUED | OUTPATIENT
Start: 2023-01-06 | End: 2023-01-06

## 2023-01-06 RX ORDER — INFLUENZA VIRUS VACCINE 15; 15; 15; 15 UG/.5ML; UG/.5ML; UG/.5ML; UG/.5ML
0.7 SUSPENSION INTRAMUSCULAR ONCE
Refills: 0 | Status: COMPLETED | OUTPATIENT
Start: 2023-01-06 | End: 2023-01-06

## 2023-01-06 RX ORDER — ASPIRIN/CALCIUM CARB/MAGNESIUM 324 MG
325 TABLET ORAL EVERY 12 HOURS
Refills: 0 | Status: DISCONTINUED | OUTPATIENT
Start: 2023-01-06 | End: 2023-01-08

## 2023-01-06 RX ORDER — POLYETHYLENE GLYCOL 3350 17 G/17G
17 POWDER, FOR SOLUTION ORAL AT BEDTIME
Refills: 0 | Status: DISCONTINUED | OUTPATIENT
Start: 2023-01-06 | End: 2023-01-08

## 2023-01-06 RX ORDER — HYDROMORPHONE HYDROCHLORIDE 2 MG/ML
0.5 INJECTION INTRAMUSCULAR; INTRAVENOUS; SUBCUTANEOUS
Refills: 0 | Status: DISCONTINUED | OUTPATIENT
Start: 2023-01-06 | End: 2023-01-06

## 2023-01-06 RX ORDER — ACETAMINOPHEN 500 MG
1000 TABLET ORAL ONCE
Refills: 0 | Status: DISCONTINUED | OUTPATIENT
Start: 2023-01-06 | End: 2023-01-08

## 2023-01-06 RX ORDER — ACETAMINOPHEN 500 MG
975 TABLET ORAL ONCE
Refills: 0 | Status: COMPLETED | OUTPATIENT
Start: 2023-01-06 | End: 2023-01-06

## 2023-01-06 RX ORDER — CELECOXIB 200 MG/1
400 CAPSULE ORAL ONCE
Refills: 0 | Status: COMPLETED | OUTPATIENT
Start: 2023-01-06 | End: 2023-01-06

## 2023-01-06 RX ORDER — SENNA PLUS 8.6 MG/1
2 TABLET ORAL AT BEDTIME
Refills: 0 | Status: DISCONTINUED | OUTPATIENT
Start: 2023-01-06 | End: 2023-01-08

## 2023-01-06 RX ORDER — DIPHENHYDRAMINE HCL 50 MG
25 CAPSULE ORAL ONCE
Refills: 0 | Status: COMPLETED | OUTPATIENT
Start: 2023-01-06 | End: 2023-01-06

## 2023-01-06 RX ORDER — CELECOXIB 200 MG/1
200 CAPSULE ORAL EVERY 12 HOURS
Refills: 0 | Status: DISCONTINUED | OUTPATIENT
Start: 2023-01-08 | End: 2023-01-08

## 2023-01-06 RX ORDER — CEFAZOLIN SODIUM 1 G
2000 VIAL (EA) INJECTION ONCE
Refills: 0 | Status: DISCONTINUED | OUTPATIENT
Start: 2023-01-06 | End: 2023-01-06

## 2023-01-06 RX ORDER — APREPITANT 80 MG/1
40 CAPSULE ORAL ONCE
Refills: 0 | Status: COMPLETED | OUTPATIENT
Start: 2023-01-06 | End: 2023-01-06

## 2023-01-06 RX ORDER — CEFAZOLIN SODIUM 1 G
2000 VIAL (EA) INJECTION
Refills: 0 | Status: COMPLETED | OUTPATIENT
Start: 2023-01-06 | End: 2023-01-07

## 2023-01-06 RX ORDER — CHOLECALCIFEROL (VITAMIN D3) 125 MCG
1 CAPSULE ORAL
Qty: 0 | Refills: 0 | DISCHARGE

## 2023-01-06 RX ORDER — ONDANSETRON 8 MG/1
4 TABLET, FILM COATED ORAL EVERY 6 HOURS
Refills: 0 | Status: DISCONTINUED | OUTPATIENT
Start: 2023-01-06 | End: 2023-01-08

## 2023-01-06 RX ORDER — QUETIAPINE FUMARATE 200 MG/1
25 TABLET, FILM COATED ORAL DAILY
Refills: 0 | Status: DISCONTINUED | OUTPATIENT
Start: 2023-01-06 | End: 2023-01-08

## 2023-01-06 RX ORDER — OXYCODONE HYDROCHLORIDE 5 MG/1
10 TABLET ORAL
Refills: 0 | Status: DISCONTINUED | OUTPATIENT
Start: 2023-01-06 | End: 2023-01-08

## 2023-01-06 RX ORDER — VANCOMYCIN HCL 1 G
1000 VIAL (EA) INTRAVENOUS ONCE
Refills: 0 | Status: COMPLETED | OUTPATIENT
Start: 2023-01-06 | End: 2023-01-06

## 2023-01-06 RX ORDER — VANCOMYCIN HCL 1 G
1000 VIAL (EA) INTRAVENOUS ONCE
Refills: 0 | Status: COMPLETED | OUTPATIENT
Start: 2023-01-07 | End: 2023-01-06

## 2023-01-06 RX ORDER — ACETAMINOPHEN 500 MG
975 TABLET ORAL EVERY 8 HOURS
Refills: 0 | Status: DISCONTINUED | OUTPATIENT
Start: 2023-01-06 | End: 2023-01-08

## 2023-01-06 RX ORDER — SIMVASTATIN 20 MG/1
1 TABLET, FILM COATED ORAL
Qty: 0 | Refills: 0 | DISCHARGE

## 2023-01-06 RX ORDER — OXYCODONE HYDROCHLORIDE 5 MG/1
5 TABLET ORAL
Refills: 0 | Status: DISCONTINUED | OUTPATIENT
Start: 2023-01-06 | End: 2023-01-08

## 2023-01-06 RX ADMIN — APREPITANT 40 MILLIGRAM(S): 80 CAPSULE ORAL at 10:06

## 2023-01-06 RX ADMIN — Medication 15 MILLIGRAM(S): at 20:10

## 2023-01-06 RX ADMIN — CELECOXIB 400 MILLIGRAM(S): 200 CAPSULE ORAL at 10:06

## 2023-01-06 RX ADMIN — OXYCODONE HYDROCHLORIDE 10 MILLIGRAM(S): 5 TABLET ORAL at 20:10

## 2023-01-06 RX ADMIN — Medication 250 MILLIGRAM(S): at 11:54

## 2023-01-06 RX ADMIN — Medication 975 MILLIGRAM(S): at 10:06

## 2023-01-06 RX ADMIN — Medication 15 MILLIGRAM(S): at 19:55

## 2023-01-06 RX ADMIN — Medication 15 MILLIGRAM(S): at 23:18

## 2023-01-06 RX ADMIN — Medication 250 MILLIGRAM(S): at 23:22

## 2023-01-06 RX ADMIN — SIMVASTATIN 40 MILLIGRAM(S): 20 TABLET, FILM COATED ORAL at 22:12

## 2023-01-06 RX ADMIN — SODIUM CHLORIDE 500 MILLILITER(S): 9 INJECTION INTRAMUSCULAR; INTRAVENOUS; SUBCUTANEOUS at 17:36

## 2023-01-06 RX ADMIN — Medication 2000 MILLIGRAM(S): at 22:13

## 2023-01-06 RX ADMIN — Medication 15 MILLIGRAM(S): at 23:27

## 2023-01-06 RX ADMIN — Medication 975 MILLIGRAM(S): at 22:12

## 2023-01-06 RX ADMIN — OXYCODONE HYDROCHLORIDE 10 MILLIGRAM(S): 5 TABLET ORAL at 19:56

## 2023-01-06 RX ADMIN — SODIUM CHLORIDE 150 MILLILITER(S): 9 INJECTION INTRAMUSCULAR; INTRAVENOUS; SUBCUTANEOUS at 23:19

## 2023-01-06 RX ADMIN — SENNA PLUS 2 TABLET(S): 8.6 TABLET ORAL at 22:12

## 2023-01-06 RX ADMIN — Medication 975 MILLIGRAM(S): at 22:35

## 2023-01-06 RX ADMIN — Medication 25 MILLIGRAM(S): at 23:53

## 2023-01-06 NOTE — CONSULT NOTE ADULT - ASSESSMENT
68 y/o Female with history of  anxiety , neck pain, back pain , melanoma and arthritis  s/p left hip replacement 2020 since then pt c/o multiple dislocations. Pt had surgery / revision in 8/2021 . LAst dislocation in NOv 2022, came in here for elective revision of left hip on 1/6/22 with Dr. Guerrero s/p procedure.     Plan:   Left hip pain s/p revision of left hip post op day 0:       - post op no complications  - VS stable  - abx per ortho   - c/w anti hypertensive to be restarted post op day 02 except if blood pressure goes >150 systolic   - c/w IVF x 24 hrs then reassess per ortho  - opiate induced constipation regimen   - encouraging incentive spirometry   -c/w local wound care per ortho   -DVT prophylaxis and Pain meds as per Ortho team   -PT/OT and weight bearing per ortho     Anxiety: on Alprazolam 1 mg  3 times a day, As needed, anxiety, Seroquel 25 mg daily.     HLD: on simvastatin 40 mg once a day (at bedtime    Patient is high risk for DVT given hx of melanoma, would recommand Lovenox sc for dvt prophylaxis.   ·
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
## 2023-01-06 NOTE — DISCHARGE NOTE PROVIDER - NSDCFUADDINST_GEN_ALL_CORE_FT
The patient will be seen in the office between 2-3 weeks for wound check.   **Your first post-operative visit has been scheduled prior to your admission. PLEASE CONTACT OFFICE TO CONFIRM THE APPOINTMENT DATE. Tape will be removed at that time.  **  The silver based dressing is to be removed 7 days from the date of surgery (1/13).   ** CONTACT THE OFFICE IF THE FOLLOWING DEVELOP:  - the dressing becomes soiled or saturated  - you develop a fever greater that 101F  - the wound becomes red or you develop blistering around the wound  * Patient may shower after post-op day #3.   * The patient will continue home PT consistent with posterior total hip replacement protocol and will continue to practice posterior total hip precautions for a minimum of 6 week. Transition to outpatient PT will occur at the time of the first office visit.   * The patient is FULL weight bearing.  * The patient will continue ASPIRIN for 6 weeks after surgery for blood clot prevention.  * While on aspirin, the patient will take daily omeprazole or other similar medication to protect the stomach from irritation.   * The patient will take OXYCODONE AND TYLENOL for pain control and adjust according to prescription and patient needs. Contact the office if pain increases while taking prescribed pain medications or related concerns develop.  * Celebrex will be taken twice daily for 3 weeks for pain control and prevention of excessive bone growth. Additional prescription may be requested at your office follow-up visit.  * The patient will take Senna S while taking oxycodone to prevent narcotic associated constipation.  Additionally, increase water intake (drink at least 8 glasses of water daily) and try adding fiber to the diet by eating fruits, vegetables and foods that are rich in grains. If constipation is experienced, contact the medical/primary care provider to discuss further treatment options.  * To avoid injury at home:  - continue use of rolling walker until cleared by physical therapist  - have family or friend remove all throw rug or objects in hallways that may present a trip hazard.  - if you experience any dizziness or medical concerns, call your medical doctor or  911.  * The implant may activate metal detection devices.  The patient will be seen in the office between 2-3 weeks for wound check.   **Your first post-operative visit has been scheduled prior to your admission. PLEASE CONTACT OFFICE TO CONFIRM THE APPOINTMENT DATE. Tape will be removed at that time.  **  The silver based dressing is to be removed 7 days from the date of surgery (1/13).   ** CONTACT THE OFFICE IF THE FOLLOWING DEVELOP:  - the dressing becomes soiled or saturated  - you develop a fever greater that 101F  - the wound becomes red or you develop blistering around the wound  * Patient may shower after post-op day #3.   * The patient will continue home PT consistent with posterior total hip replacement protocol and will continue to practice posterior total hip precautions for a minimum of 6 week. Transition to outpatient PT will occur at the time of the first office visit.   * The patient is FULL weight bearing.  * The patient will continue ASPIRIN for 6 weeks after surgery for blood clot prevention.  * While on aspirin, the patient will take daily omeprazole or other similar medication to protect the stomach from irritation.   * The patient will take PERCOCET for pain control and adjust according to prescription and patient needs. Contact the office if pain increases while taking prescribed pain medications or related concerns develop.  * Celebrex will be taken twice daily for 3 weeks for pain control and prevention of excessive bone growth. Additional prescription may be requested at your office follow-up visit.  * The patient will take Senna S while taking oxycodone to prevent narcotic associated constipation.  Additionally, increase water intake (drink at least 8 glasses of water daily) and try adding fiber to the diet by eating fruits, vegetables and foods that are rich in grains. If constipation is experienced, contact the medical/primary care provider to discuss further treatment options.  * To avoid injury at home:  - continue use of rolling walker until cleared by physical therapist  - have family or friend remove all throw rug or objects in hallways that may present a trip hazard.  - if you experience any dizziness or medical concerns, call your medical doctor or  911.  * The implant may activate metal detection devices.

## 2023-01-06 NOTE — PROGRESS NOTE ADULT - SUBJECTIVE AND OBJECTIVE BOX
History: Patient is status post LEFT posterior total hip revision on 1/6, POD # 0.   Patient is doing well and is comfortable.   The patient's pain is controlled using the prescribed pain medications.   The patient is participating in physical therapy.   Denies nausea, vomiting, chest pain, shortness of breath, abdominal pain or fever. No new complaints.    Vital Signs Last 24 Hrs  T(C): 36.8 (06 Jan 2023 20:54), Max: 37 (06 Jan 2023 20:16)  T(F): 98.2 (06 Jan 2023 20:54), Max: 98.6 (06 Jan 2023 20:16)  HR: 83 (06 Jan 2023 20:54) (57 - 83)  BP: 138/82 (06 Jan 2023 20:54) (91/52 - 139/86)  BP(mean): 74 (06 Jan 2023 20:16) (62 - 84)  RR: 18 (06 Jan 2023 20:54) (12 - 21)  SpO2: 93% (06 Jan 2023 20:54) (93% - 100%)    Parameters below as of 06 Jan 2023 20:54  Patient On (Oxygen Delivery Method): room air      Physical exam:   The left hip dressing is clean, dry and intact. No drainage or discharge.   The calf is supple nontender. Passive range of motion is acceptable to due postoperative pain. No calf tenderness.   Sensation to light touch is grossly intact distally.   Motor function distally is 5/5. No foot drop.   2+ dorsalis pedis pulse. Capillary refill is less than 2 seconds. No cyanosis.    Primary Orthopedic Assessment:  • s/p LEFT Posterior total hip revision    Plan:   • DVT prophylaxis as prescribed, including use of compression devices and ankle pumps  • Continue physical therapy  • Weightbearing as tolerated of the LEFT lower extremity with assistance of a walker  • Incentive spirometry encouraged  • Pain control as clinically indicated  • Posterior hip precautions reviewed with patient  • ANCEF per scip

## 2023-01-06 NOTE — DISCHARGE NOTE PROVIDER - NSDCFUSCHEDAPPT_GEN_ALL_CORE_FT
Elebiary, Yeon J  [facility]  ORTHOSURG [address]  Scheduled Appointment: 01/27/2023    Elebiary, Yeon J  [facility]  ORTHOSURG [address]  Scheduled Appointment: 02/28/2023    German Guerrero  [facility]  ORTHOSURG [address]  Scheduled Appointment: 03/29/2023

## 2023-01-06 NOTE — DISCHARGE NOTE PROVIDER - HOSPITAL COURSE
The patient underwent a LEFT POSTERIOR TOTAL HIP REVISION on 1/6. The patient received antibiotics consistent with SCIP guidelines. The patient underwent the procedure and had no intra-operative complications. Post-operatively, the patient was seen by medicine and PT. The patient received ASPIRIN for DVTP. The patient received pain medications per orthopedic pain management pathway and the pain was appropriately controlled. Patient was instructed on posterior total hip precautions by PT. The patient did not have any post-operative medical complications. The patient was discharged in stable condition.

## 2023-01-06 NOTE — DISCHARGE NOTE PROVIDER - NSDCMRMEDTOKEN_GEN_ALL_CORE_FT
ALPRAZolam 1 mg oral tablet: 1 tab(s) orally 3 times a day, As needed, anxiety  Motrin 600 mg oral tablet: 1 tab(s) orally every 6 hours, As Needed  mupirocin 2% topical ointment: Apply topically to both nostrils 2 times a dayb 5 days vbefore surgical procedure   oxyCODONE HCl - 5 MG Oral Tablet:   SEROquel 25 mg oral tablet: 1  orally  simvastatin 40 mg oral tablet: 1 tab(s) orally once a day (at bedtime)  Vitamin D3 25 mcg (1000 intl units) oral tablet: 1 tab(s) orally once a day   ALPRAZolam 1 mg oral tablet: 1 tab(s) orally 3 times a day, As needed, anxiety  Aspirin Enteric Coated 325 mg oral delayed release tablet: 1 tab(s) orally 2 times a day MDD:2  CeleBREX 200 mg oral capsule: 1 cap(s) orally 2 times a day MDD:2  omeprazole 20 mg oral delayed release tablet: 1 tab(s) orally once a day MDD:1  oxycodone-acetaminophen 5 mg-325 mg oral tablet: 1 tab(s) orally every 6 hours as needed for pain MDD:4 tabs  QUEtiapine 25 mg oral tablet: 1 tab(s) orally once a day  Senna S 50 mg-8.6 mg oral tablet: 2 tab(s) orally once a day (at bedtime) MDD:2  simvastatin 40 mg oral tablet: 1 tab(s) orally once a day (at bedtime)  Vitamin D3 25 mcg (1000 intl units) oral tablet: 1 tab(s) orally once a day

## 2023-01-06 NOTE — DISCHARGE NOTE PROVIDER - CARE PROVIDER_API CALL
German Guerrero)  Orthopaedic Surgery  [address] [place] [address]  Phone: [phone]  Fax: [phone]  Follow Up Time:

## 2023-01-07 LAB
ANION GAP SERPL CALC-SCNC: 9 MMOL/L — SIGNIFICANT CHANGE UP (ref 5–17)
APTT BLD: 36.8 SEC — HIGH (ref 27.5–35.5)
BUN SERPL-MCNC: 12.7 MG/DL — SIGNIFICANT CHANGE UP (ref 8–20)
CALCIUM SERPL-MCNC: 8.9 MG/DL — SIGNIFICANT CHANGE UP (ref 8.4–10.5)
CHLORIDE SERPL-SCNC: 103 MMOL/L — SIGNIFICANT CHANGE UP (ref 96–108)
CO2 SERPL-SCNC: 25 MMOL/L — SIGNIFICANT CHANGE UP (ref 22–29)
CREAT SERPL-MCNC: 0.77 MG/DL — SIGNIFICANT CHANGE UP (ref 0.5–1.3)
EGFR: 84 ML/MIN/1.73M2 — SIGNIFICANT CHANGE UP
GLUCOSE SERPL-MCNC: 136 MG/DL — HIGH (ref 70–99)
HCT VFR BLD CALC: 35.1 % — SIGNIFICANT CHANGE UP (ref 34.5–45)
HGB BLD-MCNC: 11.3 G/DL — LOW (ref 11.5–15.5)
INR BLD: 1.03 RATIO — SIGNIFICANT CHANGE UP (ref 0.88–1.16)
MCHC RBC-ENTMCNC: 31.2 PG — SIGNIFICANT CHANGE UP (ref 27–34)
MCHC RBC-ENTMCNC: 32.2 GM/DL — SIGNIFICANT CHANGE UP (ref 32–36)
MCV RBC AUTO: 97 FL — SIGNIFICANT CHANGE UP (ref 80–100)
PLATELET # BLD AUTO: 186 K/UL — SIGNIFICANT CHANGE UP (ref 150–400)
POTASSIUM SERPL-MCNC: 3.5 MMOL/L — SIGNIFICANT CHANGE UP (ref 3.5–5.3)
POTASSIUM SERPL-SCNC: 3.5 MMOL/L — SIGNIFICANT CHANGE UP (ref 3.5–5.3)
PROTHROM AB SERPL-ACNC: 12 SEC — SIGNIFICANT CHANGE UP (ref 10.5–13.4)
RBC # BLD: 3.62 M/UL — LOW (ref 3.8–5.2)
RBC # FLD: 12.6 % — SIGNIFICANT CHANGE UP (ref 10.3–14.5)
SODIUM SERPL-SCNC: 137 MMOL/L — SIGNIFICANT CHANGE UP (ref 135–145)
WBC # BLD: 13.58 K/UL — HIGH (ref 3.8–10.5)
WBC # FLD AUTO: 13.58 K/UL — HIGH (ref 3.8–10.5)

## 2023-01-07 PROCEDURE — 99233 SBSQ HOSP IP/OBS HIGH 50: CPT

## 2023-01-07 RX ORDER — ALPRAZOLAM 0.25 MG
0.5 TABLET ORAL AT BEDTIME
Refills: 0 | Status: DISCONTINUED | OUTPATIENT
Start: 2023-01-07 | End: 2023-01-08

## 2023-01-07 RX ORDER — DIPHENHYDRAMINE HCL 50 MG
25 CAPSULE ORAL EVERY 6 HOURS
Refills: 0 | Status: DISCONTINUED | OUTPATIENT
Start: 2023-01-07 | End: 2023-01-07

## 2023-01-07 RX ORDER — NICOTINE POLACRILEX 2 MG
1 GUM BUCCAL DAILY
Refills: 0 | Status: DISCONTINUED | OUTPATIENT
Start: 2023-01-07 | End: 2023-01-08

## 2023-01-07 RX ORDER — LORATADINE 10 MG/1
10 TABLET ORAL DAILY
Refills: 0 | Status: DISCONTINUED | OUTPATIENT
Start: 2023-01-07 | End: 2023-01-08

## 2023-01-07 RX ORDER — LANOLIN ALCOHOL/MO/W.PET/CERES
3 CREAM (GRAM) TOPICAL AT BEDTIME
Refills: 0 | Status: DISCONTINUED | OUTPATIENT
Start: 2023-01-07 | End: 2023-01-08

## 2023-01-07 RX ADMIN — Medication 15 MILLIGRAM(S): at 12:57

## 2023-01-07 RX ADMIN — OXYCODONE HYDROCHLORIDE 10 MILLIGRAM(S): 5 TABLET ORAL at 11:21

## 2023-01-07 RX ADMIN — LORATADINE 10 MILLIGRAM(S): 10 TABLET ORAL at 12:57

## 2023-01-07 RX ADMIN — POLYETHYLENE GLYCOL 3350 17 GRAM(S): 17 POWDER, FOR SOLUTION ORAL at 21:46

## 2023-01-07 RX ADMIN — Medication 975 MILLIGRAM(S): at 14:00

## 2023-01-07 RX ADMIN — OXYCODONE HYDROCHLORIDE 5 MILLIGRAM(S): 5 TABLET ORAL at 14:25

## 2023-01-07 RX ADMIN — Medication 975 MILLIGRAM(S): at 21:46

## 2023-01-07 RX ADMIN — Medication 1 PATCH: at 19:12

## 2023-01-07 RX ADMIN — QUETIAPINE FUMARATE 25 MILLIGRAM(S): 200 TABLET, FILM COATED ORAL at 21:43

## 2023-01-07 RX ADMIN — Medication 975 MILLIGRAM(S): at 05:31

## 2023-01-07 RX ADMIN — Medication 3 MILLIGRAM(S): at 23:09

## 2023-01-07 RX ADMIN — Medication 975 MILLIGRAM(S): at 13:01

## 2023-01-07 RX ADMIN — Medication 0.5 MILLIGRAM(S): at 18:36

## 2023-01-07 RX ADMIN — Medication 15 MILLIGRAM(S): at 13:26

## 2023-01-07 RX ADMIN — Medication 975 MILLIGRAM(S): at 21:44

## 2023-01-07 RX ADMIN — Medication 15 MILLIGRAM(S): at 05:29

## 2023-01-07 RX ADMIN — OXYCODONE HYDROCHLORIDE 5 MILLIGRAM(S): 5 TABLET ORAL at 13:27

## 2023-01-07 RX ADMIN — Medication 15 MILLIGRAM(S): at 05:31

## 2023-01-07 RX ADMIN — Medication 325 MILLIGRAM(S): at 18:34

## 2023-01-07 RX ADMIN — SENNA PLUS 2 TABLET(S): 8.6 TABLET ORAL at 21:46

## 2023-01-07 RX ADMIN — OXYCODONE HYDROCHLORIDE 10 MILLIGRAM(S): 5 TABLET ORAL at 10:25

## 2023-01-07 RX ADMIN — Medication 975 MILLIGRAM(S): at 05:29

## 2023-01-07 RX ADMIN — Medication 1 PATCH: at 18:35

## 2023-01-07 RX ADMIN — SIMVASTATIN 40 MILLIGRAM(S): 20 TABLET, FILM COATED ORAL at 21:44

## 2023-01-07 RX ADMIN — Medication 325 MILLIGRAM(S): at 05:50

## 2023-01-07 RX ADMIN — Medication 2000 MILLIGRAM(S): at 05:30

## 2023-01-07 NOTE — PHYSICAL THERAPY INITIAL EVALUATION ADULT - ADDITIONAL COMMENTS
Patient lives with spouse who is home and able to assist, in a two-story home with 4 GIULIANA + 2 railings and 8 to bed/bath with 2 railings. Patient has RW, commode, tub bench, SAC

## 2023-01-07 NOTE — PROGRESS NOTE ADULT - SUBJECTIVE AND OBJECTIVE BOX
Patient seen and examined . S/p  left posterior total hip revision arthroplasty on 1/6, POD#1. Seen earlier this am , AAOX3 but sedated after Oxy 10 mg being given .   Two white capsules found on the bed , patient states she brought her Home Gabapentin 100 mg capsules , denied taking them . States feels very sleepy as she didin't slept well for 2 nights .   Denies n/v , denies sob/ chest pain , voiding , last BM 3 days ago     CC : L hip pain well controlled , feeling sleepy         MEDICATIONS  (STANDING):  acetaminophen     Tablet .. 975 milliGRAM(s) Oral every 8 hours  acetaminophen   IVPB .. 1000 milliGRAM(s) IV Intermittent once  aspirin enteric coated 325 milliGRAM(s) Oral every 12 hours  influenza  Vaccine (HIGH DOSE) 0.7 milliLiter(s) IntraMuscular once  loratadine 10 milliGRAM(s) Oral daily  polyethylene glycol 3350 17 Gram(s) Oral at bedtime  QUEtiapine 25 milliGRAM(s) Oral daily  senna 2 Tablet(s) Oral at bedtime  simvastatin 40 milliGRAM(s) Oral at bedtime  sodium chloride 0.9%. 1000 milliLiter(s) (150 mL/Hr) IV Continuous <Continuous>    MEDICATIONS  (PRN):  HYDROmorphone   Tablet 4 milliGRAM(s) Oral every 3 hours PRN Severe Pain (7 - 10)  magnesium hydroxide Suspension 30 milliLiter(s) Oral daily PRN Constipation  ondansetron Injectable 4 milliGRAM(s) IV Push every 6 hours PRN Nausea and/or Vomiting  oxyCODONE    IR 5 milliGRAM(s) Oral every 3 hours PRN Mild Pain (1 - 3)  oxyCODONE    IR 10 milliGRAM(s) Oral every 3 hours PRN Moderate Pain (4 - 6)      LABS:            PT/INR - ( 07 Jan 2023 04:51 )   PT: 12.0 sec;   INR: 1.03 ratio         PTT - ( 07 Jan 2023 04:51 )  PTT:36.8 sec      RADIOLOGY & ADDITIONAL TESTS:      REVIEW OF SYSTEMS:    As above , all other systems are reviewed and are negative .    Vital Signs Last 24 Hrs  T(C): 36.8 (07 Jan 2023 15:57), Max: 37 (06 Jan 2023 20:16)  T(F): 98.2 (07 Jan 2023 15:57), Max: 98.6 (06 Jan 2023 20:16)  HR: 70 (07 Jan 2023 15:57) (57 - 86)  BP: 136/78 (07 Jan 2023 15:57) (101/69 - 138/82)  BP(mean): 74 (06 Jan 2023 20:16) (70 - 84)  RR: 18 (07 Jan 2023 15:57) (17 - 21)  SpO2: 96% (07 Jan 2023 15:57) (93% - 97%)    Parameters below as of 07 Jan 2023 15:57  Patient On (Oxygen Delivery Method): room air      PHYSICAL EXAM:    GENERAL: NAD, well-groomed, well-developed  HEAD:  Atraumatic, Normocephalic  EYES: EOMI, PERRLA, conjunctiva and sclera clear  NECK: Supple, No JVD, Normal thyroid  NERVOUS SYSTEM:  Alert & Oriented X3, no focal deficit  CHEST/LUNG: CTA b/l ,  no  rales, rhonchi, wheezing, or rubs  HEART: Regular rate and rhythm; No murmurs, rubs, or gallops  ABDOMEN: Soft, Nontender, Nondistended; Bowel sounds present  EXTREMITIES:  2+ Peripheral Pulses, No clubbing, cyanosis, or edema,   L hip dressing + , C/D/I   LYMPH: No lymphadenopathy noted  SKIN: No rashes or lesions   Statement Selected

## 2023-01-07 NOTE — PROGRESS NOTE ADULT - SUBJECTIVE AND OBJECTIVE BOX
TRISHA LOVE    [id number]    History: Patient is status post left posterior total hip revision arthroplasty on 1/6, POD#1. Patient is doing well. The patient's pain is controlled using the prescribed pain medications. The patient will participating in physical therapy. Denies nausea, vomiting, chest pain, shortness of breath, abdominal pain or fever. No new complaints.      MEDICATIONS  (STANDING):  acetaminophen     Tablet .. 975 milliGRAM(s) Oral every 8 hours  acetaminophen   IVPB .. 1000 milliGRAM(s) IV Intermittent once  aspirin enteric coated 325 milliGRAM(s) Oral every 12 hours  influenza  Vaccine (HIGH DOSE) 0.7 milliLiter(s) IntraMuscular once  ketorolac   Injectable 15 milliGRAM(s) IV Push every 6 hours  polyethylene glycol 3350 17 Gram(s) Oral at bedtime  QUEtiapine 25 milliGRAM(s) Oral daily  senna 2 Tablet(s) Oral at bedtime  simvastatin 40 milliGRAM(s) Oral at bedtime  sodium chloride 0.9%. 1000 milliLiter(s) (150 mL/Hr) IV Continuous <Continuous>    MEDICATIONS  (PRN):  HYDROmorphone   Tablet 4 milliGRAM(s) Oral every 3 hours PRN Severe Pain (7 - 10)  magnesium hydroxide Suspension 30 milliLiter(s) Oral daily PRN Constipation  ondansetron Injectable 4 milliGRAM(s) IV Push every 6 hours PRN Nausea and/or Vomiting  oxyCODONE    IR 10 milliGRAM(s) Oral every 3 hours PRN Moderate Pain (4 - 6)  oxyCODONE    IR 5 milliGRAM(s) Oral every 3 hours PRN Mild Pain (1 - 3)    -  Tetra/Doxy: S <=4 (03.28.16 @ 15:12)      Physical exam: The left hip dressing is clean, dry and intact. No drainage or discharge. No erythema is noted. No blistering. No ecchymosis. The calf is supple nontender. Passive range of motion is acceptable to due postoperative pain. No calf tenderness. Sensation to light touch is grossly intact distally. Motor function distally is 5/5. No foot drop. 2+ dorsalis pedis pulse. Capillary refill is less than 2 seconds. No cyanosis.    Primary Orthopedic Assessment:  • s/p LEFT POSTERIOR total hip revision arthroplastyt    Secondary  Orthopedic Assessment(s):   •     Secondary  Medical Assessment(s):   •     Plan:   • DVT prophylaxis as prescribed, including use of compression devices and ankle pumps  • Continue physical therapy  • Weightbearing as tolerated of the Left  lower extremity with assistance of a walker  • Incentive spirometry encouraged  • Pain control as clinically indicated  • Posterior hip precautions reviewed with patient  • Discharge planning – anticipated discharge is Home   Follow up OR cultures

## 2023-01-07 NOTE — PROGRESS NOTE ADULT - ASSESSMENT
68 y/o Female with history of  anxiety , neck pain, back pain , melanoma and arthritis  s/p left hip replacement 2020 since then pt c/o multiple dislocations. Pt had surgery / revision in 8/2021 . Last  dislocation in Nov 2022, came in here for elective revision of left hip on 1/6/22 with Dr. Guerrero s/p procedure.     Plan:   Left hip pain due to failed L RITIKA ,    s/p revision of left hip post op day 0:     PT/OT/pain mgmt  DVT prophylaxis- as per ortho  Abx as per SCIP- given   Incentive spirometry  Prophylaxis of opioid  induced constipation.      Anxiety: continue Seroquel 25 mg DAILY    on Alprazolam 1 mg  3 times a day, As needed at home - will hold for sedation today and restart in am     HLD: on simvastatin 40 mg once a day (at bedtime      Sedated earlier this am - spoke with nurse - told to give only 5 mg Oxy PRN ,   will hold Xanax/ Gabapentin today . NOot sure if patient took her own medications this am as 2 capsules of Gabapentin were  found on patient's bed. Patient educated not to take her own medications   Nurse aware .   Will d/c Benadryl   EBL- 200 ml - will check CBC/BMP     DVT prophylaxis  - as per ortho protocol- on ASA BID   Opioid induced constipation  prophylaxis - bowel regimen     Will follow along with you .

## 2023-01-07 NOTE — PHYSICAL THERAPY INITIAL EVALUATION ADULT - IMPAIRMENTS CONTRIBUTING TO GAIT DEVIATIONS, PT EVAL
easily distracted by noises in hallway, required cueing on focusing on task at hand, and to prevent internal rotation of left hip in static stance and ambulation/impaired balance/decreased strength

## 2023-01-07 NOTE — PHYSICAL THERAPY INITIAL EVALUATION ADULT - PERTINENT HX OF CURRENT PROBLEM, REHAB EVAL
67 year old f who s/p left total hip arthroplasty in 2018 followed by revision with elevated liner in 8/2021 due to recurrent dislocations who presents c/o left hip pain and deformity s/p feeling a pop when bending over to  object from floor.  patient was found to have left dislocated total hip arthroplasty.  patient had multiple attempts at a reduction in ER with unsuccessful reduction, received  closed reduction Left hip under sedation in 11/22. Patient is now s/p elective L total hip revision

## 2023-01-08 ENCOUNTER — TRANSCRIPTION ENCOUNTER (OUTPATIENT)
Age: 68
End: 2023-01-08

## 2023-01-08 VITALS
SYSTOLIC BLOOD PRESSURE: 137 MMHG | HEART RATE: 68 BPM | OXYGEN SATURATION: 98 % | TEMPERATURE: 98 F | RESPIRATION RATE: 19 BRPM | DIASTOLIC BLOOD PRESSURE: 78 MMHG

## 2023-01-08 PROCEDURE — 85027 COMPLETE CBC AUTOMATED: CPT

## 2023-01-08 PROCEDURE — 99232 SBSQ HOSP IP/OBS MODERATE 35: CPT

## 2023-01-08 PROCEDURE — C1713: CPT

## 2023-01-08 PROCEDURE — 73502 X-RAY EXAM HIP UNI 2-3 VIEWS: CPT

## 2023-01-08 PROCEDURE — 85610 PROTHROMBIN TIME: CPT

## 2023-01-08 PROCEDURE — 36415 COLL VENOUS BLD VENIPUNCTURE: CPT

## 2023-01-08 PROCEDURE — C1776: CPT

## 2023-01-08 PROCEDURE — 87070 CULTURE OTHR SPECIMN AEROBIC: CPT

## 2023-01-08 PROCEDURE — 85730 THROMBOPLASTIN TIME PARTIAL: CPT

## 2023-01-08 PROCEDURE — 80048 BASIC METABOLIC PNL TOTAL CA: CPT

## 2023-01-08 RX ORDER — SENNOSIDES/DOCUSATE SODIUM 8.6MG-50MG
2 TABLET ORAL
Qty: 14 | Refills: 0
Start: 2023-01-08 | End: 2023-01-14

## 2023-01-08 RX ORDER — CELECOXIB 200 MG/1
1 CAPSULE ORAL
Qty: 42 | Refills: 0
Start: 2023-01-08 | End: 2023-01-28

## 2023-01-08 RX ORDER — OMEPRAZOLE 10 MG/1
1 CAPSULE, DELAYED RELEASE ORAL
Qty: 42 | Refills: 0
Start: 2023-01-08 | End: 2023-02-18

## 2023-01-08 RX ORDER — OXYCODONE AND ACETAMINOPHEN 5; 325 MG/1; MG/1
1 TABLET ORAL
Qty: 28 | Refills: 0
Start: 2023-01-08 | End: 2023-01-14

## 2023-01-08 RX ORDER — QUETIAPINE FUMARATE 200 MG/1
1 TABLET, FILM COATED ORAL
Qty: 0 | Refills: 0 | DISCHARGE

## 2023-01-08 RX ORDER — ASPIRIN/CALCIUM CARB/MAGNESIUM 324 MG
1 TABLET ORAL
Qty: 84 | Refills: 0
Start: 2023-01-08 | End: 2023-02-18

## 2023-01-08 RX ORDER — QUETIAPINE FUMARATE 200 MG/1
1 TABLET, FILM COATED ORAL
Qty: 0 | Refills: 0 | DISCHARGE
Start: 2023-01-08

## 2023-01-08 RX ORDER — IBUPROFEN 200 MG
1 TABLET ORAL
Qty: 0 | Refills: 0 | DISCHARGE

## 2023-01-08 RX ADMIN — Medication 1 PATCH: at 07:52

## 2023-01-08 RX ADMIN — Medication 975 MILLIGRAM(S): at 04:52

## 2023-01-08 RX ADMIN — CELECOXIB 200 MILLIGRAM(S): 200 CAPSULE ORAL at 04:52

## 2023-01-08 RX ADMIN — Medication 975 MILLIGRAM(S): at 05:30

## 2023-01-08 RX ADMIN — Medication 325 MILLIGRAM(S): at 04:52

## 2023-01-08 RX ADMIN — OXYCODONE HYDROCHLORIDE 10 MILLIGRAM(S): 5 TABLET ORAL at 04:53

## 2023-01-08 RX ADMIN — LORATADINE 10 MILLIGRAM(S): 10 TABLET ORAL at 12:52

## 2023-01-08 RX ADMIN — CELECOXIB 200 MILLIGRAM(S): 200 CAPSULE ORAL at 05:30

## 2023-01-08 RX ADMIN — OXYCODONE HYDROCHLORIDE 10 MILLIGRAM(S): 5 TABLET ORAL at 05:53

## 2023-01-08 NOTE — PROGRESS NOTE ADULT - SUBJECTIVE AND OBJECTIVE BOX
TRISHA IVAN    [id number]    History: Patient is status post left posterior total hip revision arthroplasty on POD#2. Patient is doing well. The patient's pain is controlled using the prescribed pain medications. The patient is participating in physical therapy. Denies nausea, vomiting, chest pain, shortness of breath, abdominal pain or fever. No new complaints. Pt requesting percocet for discharge.     Vital Signs Last 24 Hrs  T(C): 36.4 (08 Jan 2023 04:52), Max: 36.8 (07 Jan 2023 15:57)  T(F): 97.6 (08 Jan 2023 04:52), Max: 98.2 (07 Jan 2023 15:57)  HR: 64 (08 Jan 2023 04:52) (63 - 74)  BP: 146/80 (08 Jan 2023 04:52) (131/75 - 146/80)  BP(mean): --  RR: 18 (08 Jan 2023 04:52) (18 - 18)  SpO2: 93% (08 Jan 2023 04:52) (93% - 96%)    Parameters below as of 08 Jan 2023 04:52  Patient On (Oxygen Delivery Method): room air                          11.3   13.58 )-----------( 186      ( 07 Jan 2023 16:55 )             35.1     01-07    137  |  103  |  12.7  ----------------------------<  136<H>  3.5   |  25.0  |  0.77    Ca    8.9      07 Jan 2023 16:55      MEDICATIONS  (STANDING):  acetaminophen     Tablet .. 975 milliGRAM(s) Oral every 8 hours  acetaminophen   IVPB .. 1000 milliGRAM(s) IV Intermittent once  aspirin enteric coated 325 milliGRAM(s) Oral every 12 hours  celecoxib 200 milliGRAM(s) Oral every 12 hours  influenza  Vaccine (HIGH DOSE) 0.7 milliLiter(s) IntraMuscular once  loratadine 10 milliGRAM(s) Oral daily  nicotine - 21 mG/24Hr(s) Patch 1 Patch Transdermal daily  polyethylene glycol 3350 17 Gram(s) Oral at bedtime  QUEtiapine 25 milliGRAM(s) Oral daily  senna 2 Tablet(s) Oral at bedtime  simvastatin 40 milliGRAM(s) Oral at bedtime  sodium chloride 0.9%. 1000 milliLiter(s) (150 mL/Hr) IV Continuous <Continuous>    MEDICATIONS  (PRN):  ALPRAZolam 0.5 milliGRAM(s) Oral at bedtime PRN anxiety  bisacodyl Suppository 10 milliGRAM(s) Rectal once PRN Constipation  HYDROmorphone   Tablet 4 milliGRAM(s) Oral every 3 hours PRN Severe Pain (7 - 10)  magnesium hydroxide Suspension 30 milliLiter(s) Oral daily PRN Constipation  melatonin. 3 milliGRAM(s) Oral at bedtime PRN Insomnia  ondansetron Injectable 4 milliGRAM(s) IV Push every 6 hours PRN Nausea and/or Vomiting  oxyCODONE    IR 5 milliGRAM(s) Oral every 3 hours PRN Mild Pain (1 - 3)  oxyCODONE    IR 10 milliGRAM(s) Oral every 3 hours PRN Moderate Pain (4 - 6)      Physical exam: The left hip dressing is clean, dry and intact. No drainage or discharge. No erythema is noted. No blistering. No ecchymosis. The calf is supple nontender. Passive range of motion is acceptable to due postoperative pain. No calf tenderness. Sensation to light touch is grossly intact distally. Motor function distally is 5/5. No foot drop. 2+ dorsalis pedis pulse. Capillary refill is less than 2 seconds. No cyanosis.    Primary Orthopedic Assessment:  • s/p LEFT POSTERIOR total hip revision    Secondary  Orthopedic Assessment(s):   •     Secondary  Medical Assessment(s):   •     Culture - Surgical Swab (collected 06 Jan 2023 13:15)  Source: .Surgical Swab Left Hip #3  Preliminary Report (08 Jan 2023 07:13):    No growth    Culture - Surgical Swab (collected 06 Jan 2023 13:15)  Source: .Surgical Swab Left Hip #2  Preliminary Report (08 Jan 2023 07:14):    No growth    Culture - Surgical Swab (collected 06 Jan 2023 13:15)  Source: .Surgical Swab Left Hip #1  Preliminary Report (08 Jan 2023 07:08):    No growth      Plan:   • DVT prophylaxis as prescribed, including use of compression devices and ankle pumps  • Continue physical therapy  • Weightbearing as tolerated of the left lower extremity with assistance of a walker  • Incentive spirometry encouraged  • Pain control as clinically indicated  • Posterior hip precautions reviewed with patient  • Discharge planning – anticipated discharge is Home

## 2023-01-08 NOTE — PROGRESS NOTE ADULT - ASSESSMENT
66 y/o Female with history of  anxiety , neck pain, back pain , melanoma and arthritis  s/p left hip replacement 2020 since then pt c/o multiple dislocations. Pt had surgery / revision in 8/2021 . Last  dislocation in Nov 2022, came in here for elective revision of left hip on 1/6/22 with Dr. Guerrero s/p procedure.     Plan:   Left hip pain due to failed L RITIKA ,    s/p revision of left hip post op day 02    PT/OT/pain mgmt  DVT prophylaxis- as per ortho  Abx as per SCIP- given   Incentive spirometry  Prophylaxis of opioid  induced constipation.  Wound care as per ortho       Anxiety: continue Seroquel 25 mg DAILY    on Alprazolam 1 mg  3 times a day, As needed at home -   continue , hold for sedation     HLD: on simvastatin 40 mg once a day (at bedtime      Sedated POD#1- today AAOX4- medications adjusted   Will d/c Benadryl   EBL- 200 ml -  CBC/BMP noted :   Leucocytosis - no S/S of infection - likely reactive   Anemia - acute blood lost anemia - secondary to surgical blood lost -   patient is asymptomatic.     Hx of PFO , s/p closure   Hx of TIA - continue SA/ statins     Smoker - use to smoke 1 PPD for years now trying to cut down -    counseled on  smoking cessation     DVT prophylaxis  - as per ortho protocol- on ASA BID   Opioid induced constipation  prophylaxis - bowel regimen     Dispo plan is Home - likely today .    Medically stable to d/c once cleared by physical therapy/ ortho .   Will sign of ,   please recall as needed .  66 y/o Female with history of  anxiety , neck pain, back pain , melanoma and arthritis  s/p left hip replacement 2020 since then pt c/o multiple dislocations. Pt had surgery / revision in 8/2021 . Last  dislocation in Nov 2022, came in here for elective revision of left hip on 1/6/22 with Dr. Guerrero s/p procedure.     Plan:   Left hip pain due to failed L RITIKA ,    s/p revision of left hip post op day 02    PT/OT/pain mgmt  DVT prophylaxis- as per ortho  Abx as per SCIP- given   Incentive spirometry  Prophylaxis of opioid  induced constipation.  Wound care as per ortho   OR cultures - negative so far       Anxiety: continue Seroquel 25 mg DAILY    on Alprazolam 1 mg  3 times a day, As needed at home -   continue , hold for sedation     HLD: on simvastatin 40 mg once a day (at bedtime      Sedated POD#1- today AAOX4- medications adjusted   Will d/c Benadryl   EBL- 200 ml -  CBC/BMP noted :   Leucocytosis - no S/S of infection - likely reactive   Anemia - acute blood lost anemia - secondary to surgical blood lost -   patient is asymptomatic.     Hx of PFO , s/p closure   Hx of TIA - continue SA/ statins     Smoker - use to smoke 1 PPD for years now trying to cut down -    counseled on  smoking cessation     DVT prophylaxis  - as per ortho protocol- on ASA BID   Opioid induced constipation  prophylaxis - bowel regimen     Dispo plan is Home - likely today .    Medically stable to d/c once cleared by physical therapy/ ortho .   Will sign of ,   please recall as needed .

## 2023-01-08 NOTE — PROGRESS NOTE ADULT - SUBJECTIVE AND OBJECTIVE BOX
Patient seen and examined . Doing well this am , AAOX4 , Pain well controlled , no n/v ,   voiding , last BM X4 days ago , no abdominal pain or discomfort , passing gas. Slep well last night .   Participating with physical therapy .     CC : L hip pain well controlled , no BMX4 days             MEDICATIONS  (STANDING):  acetaminophen     Tablet .. 975 milliGRAM(s) Oral every 8 hours  acetaminophen   IVPB .. 1000 milliGRAM(s) IV Intermittent once  aspirin enteric coated 325 milliGRAM(s) Oral every 12 hours  celecoxib 200 milliGRAM(s) Oral every 12 hours  influenza  Vaccine (HIGH DOSE) 0.7 milliLiter(s) IntraMuscular once  loratadine 10 milliGRAM(s) Oral daily  nicotine - 21 mG/24Hr(s) Patch 1 Patch Transdermal daily  polyethylene glycol 3350 17 Gram(s) Oral at bedtime  QUEtiapine 25 milliGRAM(s) Oral daily  senna 2 Tablet(s) Oral at bedtime  simvastatin 40 milliGRAM(s) Oral at bedtime  sodium chloride 0.9%. 1000 milliLiter(s) (150 mL/Hr) IV Continuous <Continuous>    MEDICATIONS  (PRN):  ALPRAZolam 0.5 milliGRAM(s) Oral at bedtime PRN anxiety  bisacodyl Suppository 10 milliGRAM(s) Rectal once PRN Constipation  HYDROmorphone   Tablet 4 milliGRAM(s) Oral every 3 hours PRN Severe Pain (7 - 10)  magnesium hydroxide Suspension 30 milliLiter(s) Oral daily PRN Constipation  melatonin. 3 milliGRAM(s) Oral at bedtime PRN Insomnia  ondansetron Injectable 4 milliGRAM(s) IV Push every 6 hours PRN Nausea and/or Vomiting  oxyCODONE    IR 5 milliGRAM(s) Oral every 3 hours PRN Mild Pain (1 - 3)  oxyCODONE    IR 10 milliGRAM(s) Oral every 3 hours PRN Moderate Pain (4 - 6)      LABS:                          11.3   13.58 )-----------( 186      ( 07 Jan 2023 16:55 )             35.1     01-07    137  |  103  |  12.7  ----------------------------<  136<H>  3.5   |  25.0  |  0.77    Ca    8.9      07 Jan 2023 16:55      PT/INR - ( 07 Jan 2023 04:51 )   PT: 12.0 sec;   INR: 1.03 ratio         PTT - ( 07 Jan 2023 04:51 )  PTT:36.8 sec        REVIEW OF SYSTEMS:    As above ,all other systems are reviewed and are negative .  Vital Signs Last 24 Hrs  T(C): 36.4 (08 Jan 2023 04:52), Max: 36.8 (07 Jan 2023 15:57)  T(F): 97.6 (08 Jan 2023 04:52), Max: 98.2 (07 Jan 2023 15:57)  HR: 64 (08 Jan 2023 04:52) (63 - 70)  BP: 146/80 (08 Jan 2023 04:52) (136/78 - 146/80)  RR: 18 (08 Jan 2023 04:52) (18 - 18)  SpO2: 93% (08 Jan 2023 04:52) (93% - 96%)    Parameters below as of 08 Jan 2023 04:52  Patient On (Oxygen Delivery Method): room air      PHYSICAL EXAM:    GENERAL: NAD, well-groomed, well-developed  HEAD:  Atraumatic, Normocephalic  EYES: EOMI, PERRLA, conjunctiva and sclera clear  NECK: Supple, No JVD, Normal thyroid  NERVOUS SYSTEM:  Alert & Oriented X3, no focal deficit  CHEST/LUNG: CTA b/l ,  no  rales, rhonchi, wheezing, or rubs  HEART: Regular rate and rhythm; No murmurs, rubs, or gallops  ABDOMEN: Soft, Nontender, Nondistended; Bowel sounds present  EXTREMITIES:  2+ Peripheral Pulses, No clubbing, cyanosis, or edema,   L hip with dry dressing +   LYMPH: No lymphadenopathy noted  SKIN: No rashes or lesions   Patient seen and examined . Doing well this am , AAOX4 , Pain well controlled , no n/v ,   voiding , last BM X4 days ago , no abdominal pain or discomfort , passing gas. Slep well last night .   Participating with physical therapy .     CC : L hip pain well controlled , no BMX4 days             MEDICATIONS  (STANDING):  acetaminophen     Tablet .. 975 milliGRAM(s) Oral every 8 hours  acetaminophen   IVPB .. 1000 milliGRAM(s) IV Intermittent once  aspirin enteric coated 325 milliGRAM(s) Oral every 12 hours  celecoxib 200 milliGRAM(s) Oral every 12 hours  influenza  Vaccine (HIGH DOSE) 0.7 milliLiter(s) IntraMuscular once  loratadine 10 milliGRAM(s) Oral daily  nicotine - 21 mG/24Hr(s) Patch 1 Patch Transdermal daily  polyethylene glycol 3350 17 Gram(s) Oral at bedtime  QUEtiapine 25 milliGRAM(s) Oral daily  senna 2 Tablet(s) Oral at bedtime  simvastatin 40 milliGRAM(s) Oral at bedtime  sodium chloride 0.9%. 1000 milliLiter(s) (150 mL/Hr) IV Continuous <Continuous>    MEDICATIONS  (PRN):  ALPRAZolam 0.5 milliGRAM(s) Oral at bedtime PRN anxiety  bisacodyl Suppository 10 milliGRAM(s) Rectal once PRN Constipation  HYDROmorphone   Tablet 4 milliGRAM(s) Oral every 3 hours PRN Severe Pain (7 - 10)  magnesium hydroxide Suspension 30 milliLiter(s) Oral daily PRN Constipation  melatonin. 3 milliGRAM(s) Oral at bedtime PRN Insomnia  ondansetron Injectable 4 milliGRAM(s) IV Push every 6 hours PRN Nausea and/or Vomiting  oxyCODONE    IR 5 milliGRAM(s) Oral every 3 hours PRN Mild Pain (1 - 3)  oxyCODONE    IR 10 milliGRAM(s) Oral every 3 hours PRN Moderate Pain (4 - 6)      LABS:                          11.3   13.58 )-----------( 186      ( 07 Jan 2023 16:55 )             35.1     01-07    137  |  103  |  12.7  ----------------------------<  136<H>  3.5   |  25.0  |  0.77    Ca    8.9      07 Jan 2023 16:55      PT/INR - ( 07 Jan 2023 04:51 )   PT: 12.0 sec;   INR: 1.03 ratio         PTT - ( 07 Jan 2023 04:51 )  PTT:36.8 sec    Culture - Surgical Swab (01.06.23 @ 13:15)    Specimen Source: .Surgical Swab Left Hip #3    Culture Results:   No growth    Culture - Surgical Swab (01.06.23 @ 13:15)    Specimen Source: .Surgical Swab Left Hip #2    Culture Results:   No growth    Culture - Surgical Swab (01.06.23 @ 13:15)    Specimen Source: .Surgical Swab Left Hip #1    Culture Results:   No growth            REVIEW OF SYSTEMS:    As above ,all other systems are reviewed and are negative .  Vital Signs Last 24 Hrs  T(C): 36.4 (08 Jan 2023 04:52), Max: 36.8 (07 Jan 2023 15:57)  T(F): 97.6 (08 Jan 2023 04:52), Max: 98.2 (07 Jan 2023 15:57)  HR: 64 (08 Jan 2023 04:52) (63 - 70)  BP: 146/80 (08 Jan 2023 04:52) (136/78 - 146/80)  RR: 18 (08 Jan 2023 04:52) (18 - 18)  SpO2: 93% (08 Jan 2023 04:52) (93% - 96%)    Parameters below as of 08 Jan 2023 04:52  Patient On (Oxygen Delivery Method): room air      PHYSICAL EXAM:    GENERAL: NAD, well-groomed, well-developed  HEAD:  Atraumatic, Normocephalic  EYES: EOMI, PERRLA, conjunctiva and sclera clear  NECK: Supple, No JVD, Normal thyroid  NERVOUS SYSTEM:  Alert & Oriented X3, no focal deficit  CHEST/LUNG: CTA b/l ,  no  rales, rhonchi, wheezing, or rubs  HEART: Regular rate and rhythm; No murmurs, rubs, or gallops  ABDOMEN: Soft, Nontender, Nondistended; Bowel sounds present  EXTREMITIES:  2+ Peripheral Pulses, No clubbing, cyanosis, or edema,   L hip with dry dressing +   LYMPH: No lymphadenopathy noted  SKIN: No rashes or lesions

## 2023-01-08 NOTE — DISCHARGE NOTE NURSING/CASE MANAGEMENT/SOCIAL WORK - NSDCPEWEB_GEN_ALL_CORE
[facility] for Tobacco Control website --- [email or web address] website --- [email or web address]

## 2023-01-08 NOTE — DISCHARGE NOTE NURSING/CASE MANAGEMENT/SOCIAL WORK - NSDCPEEMAIL_GEN_ALL_CORE
[facility] for Tobacco Control email [email or web address]

## 2023-01-08 NOTE — DISCHARGE NOTE NURSING/CASE MANAGEMENT/SOCIAL WORK - PATIENT PORTAL LINK FT
You can access the FollowMyHealth Patient Portal offered by [facility] by registering at the following website: [email or web address]. By joining [facility]’s FollowMyHealth portal, you will also be able to view your health information using other applications (apps) compatible with our system.

## 2023-01-12 RX ORDER — OXYCODONE 5 MG/1
5 TABLET ORAL EVERY 6 HOURS
Qty: 10 | Refills: 0 | Status: ACTIVE | COMMUNITY
Start: 2021-07-21 | End: 1900-01-01

## 2023-01-17 ENCOUNTER — APPOINTMENT (OUTPATIENT)
Dept: ORTHOPEDIC SURGERY | Facility: CLINIC | Age: 68
End: 2023-01-17
Payer: MEDICARE

## 2023-01-17 VITALS
WEIGHT: 150 LBS | HEIGHT: 64 IN | SYSTOLIC BLOOD PRESSURE: 116 MMHG | DIASTOLIC BLOOD PRESSURE: 81 MMHG | BODY MASS INDEX: 25.61 KG/M2 | HEART RATE: 86 BPM

## 2023-01-17 PROBLEM — Z98.890 OTHER SPECIFIED POSTPROCEDURAL STATES: Chronic | Status: ACTIVE | Noted: 2022-12-27

## 2023-01-17 PROCEDURE — 73502 X-RAY EXAM HIP UNI 2-3 VIEWS: CPT

## 2023-01-17 PROCEDURE — 99024 POSTOP FOLLOW-UP VISIT: CPT

## 2023-01-17 NOTE — HISTORY OF PRESENT ILLNESS
[3] : the patient reports pain that is 3/10 in severity [Slow Progress] : is progressing slowly [Fair Pain Control] : has fair pain control [No Sign of Infection] : is showing no signs of infection [Chills] : no chills [Constipation] : no constipation [Diarrhea] : no diarrhea [Dysuria] : no dysuria [Fever] : no fever [Nausea] : no nausea [Vomiting] : no vomiting [de-identified] : s/p Revision left total hip arthroplasty including entire acetabular component and femoral head DOS:01/06/23\par s/p left total hip arthroplasty including acetabular liner and femoral head. 8/24/21. \par \par  [de-identified] : She is 10 days from the revision total hip arthroplasty.  She is walking with a walker she complains of pain in the anterior thigh and travels down to back of the knee when she walks.  No pain on bed resting.  She is using Percocet 5 mg.  She is using ibuprofen. \par She is in physical therapy.  [de-identified] : Incision is healing she has a Prineo on.\par The surgical site is clean and dry.  No drainages.   [de-identified] :    3V xray of the left hip done in the office today and reviewed and demonstrates s/p revision hip implants in good positioning with no evidence of wear, loosening, or subsidence. \par  [de-identified] : No fracture no dislocation. [de-identified] : She may add 1 more tab of Tylenol  with Percocet\par I encouraged her to stay with current management.  Continue with posterior hip precaution.  I will see her back in 10 days and remove her dressing. \par

## 2023-01-21 LAB
CULTURE RESULTS: SIGNIFICANT CHANGE UP
CULTURE RESULTS: SIGNIFICANT CHANGE UP
SPECIMEN SOURCE: SIGNIFICANT CHANGE UP
SPECIMEN SOURCE: SIGNIFICANT CHANGE UP

## 2023-01-22 LAB
CULTURE RESULTS: NO GROWTH — SIGNIFICANT CHANGE UP
SPECIMEN SOURCE: SIGNIFICANT CHANGE UP

## 2023-01-27 ENCOUNTER — APPOINTMENT (OUTPATIENT)
Dept: ORTHOPEDIC SURGERY | Facility: CLINIC | Age: 68
End: 2023-01-27
Payer: MEDICARE

## 2023-01-27 PROCEDURE — 99024 POSTOP FOLLOW-UP VISIT: CPT

## 2023-01-27 PROCEDURE — 73502 X-RAY EXAM HIP UNI 2-3 VIEWS: CPT

## 2023-01-27 RX ORDER — OXYCODONE AND ACETAMINOPHEN 5; 325 MG/1; MG/1
5-325 TABLET ORAL EVERY 6 HOURS
Qty: 30 | Refills: 0 | Status: ACTIVE | COMMUNITY
Start: 2021-11-15 | End: 1900-01-01

## 2023-01-27 RX ORDER — OMEPRAZOLE 40 MG/1
40 CAPSULE, DELAYED RELEASE ORAL DAILY
Qty: 30 | Refills: 0 | Status: ACTIVE | COMMUNITY
Start: 2023-01-27 | End: 1900-01-01

## 2023-01-27 NOTE — HISTORY OF PRESENT ILLNESS
[2] : the patient reports pain that is 2/10 in severity [Doing Well] : is doing well [No Sign of Infection] : is showing no signs of infection [Adequate Pain Control] : has adequate pain control [Chills] : no chills [Constipation] : no constipation [Diarrhea] : no diarrhea [Dysuria] : no dysuria [Fever] : no fever [Nausea] : no nausea [Vomiting] : no vomiting [de-identified] : s/p Revision left total hip arthroplasty including entire acetabular component and femoral head DOS: 01/06/23 [de-identified] : pt is 3 weeks from revision left RITIKA \par pt c/o muscle cramping\par pt c/o left leg pain , shooting down her left LE\par pt is walking with a cane \par she is not doing therapy \par she is doing some house chores .\par she request percocet to be increased.  [de-identified] : Left hip exam shows healed incision with no sign of infection. The surgical incision site(s) swollen, but clean, dry and intact, healed, not erythematous, absent of discharge and not dehisced. Additional findings included an unremarkable neurological exam, peripheral vascular exam normal and maneuvers demonstrated a negative Nishant's sign. \par  [de-identified] : no xray [de-identified] : I provided percocet 5mg, recommend not to increase her dose. she is at risk for opioid overuse. \par strict posterior hip precaution \par

## 2023-02-01 ENCOUNTER — TRANSCRIPTION ENCOUNTER (OUTPATIENT)
Age: 68
End: 2023-02-01

## 2023-02-02 ENCOUNTER — TRANSCRIPTION ENCOUNTER (OUTPATIENT)
Age: 68
End: 2023-02-02

## 2023-02-02 ENCOUNTER — RESULT REVIEW (OUTPATIENT)
Age: 68
End: 2023-02-02

## 2023-02-02 ENCOUNTER — INPATIENT (INPATIENT)
Facility: HOSPITAL | Age: 68
LOS: 0 days | Discharge: HOME | DRG: 468 | End: 2023-02-03
Attending: ORTHOPAEDIC SURGERY | Admitting: ORTHOPAEDIC SURGERY
Payer: MEDICARE

## 2023-02-02 VITALS
SYSTOLIC BLOOD PRESSURE: 155 MMHG | HEART RATE: 70 BPM | DIASTOLIC BLOOD PRESSURE: 78 MMHG | RESPIRATION RATE: 16 BRPM | TEMPERATURE: 98 F | OXYGEN SATURATION: 95 % | HEIGHT: 64 IN | WEIGHT: 147.93 LBS

## 2023-02-02 DIAGNOSIS — Z98.890 OTHER SPECIFIED POSTPROCEDURAL STATES: Chronic | ICD-10-CM

## 2023-02-02 DIAGNOSIS — L05.91 PILONIDAL CYST WITHOUT ABSCESS: Chronic | ICD-10-CM

## 2023-02-02 DIAGNOSIS — Z98.89 OTHER SPECIFIED POSTPROCEDURAL STATES: Chronic | ICD-10-CM

## 2023-02-02 DIAGNOSIS — Z96.659 PRESENCE OF UNSPECIFIED ARTIFICIAL KNEE JOINT: Chronic | ICD-10-CM

## 2023-02-02 DIAGNOSIS — Z96.642 PRESENCE OF LEFT ARTIFICIAL HIP JOINT: Chronic | ICD-10-CM

## 2023-02-02 DIAGNOSIS — S73.005A UNSPECIFIED DISLOCATION OF LEFT HIP, INITIAL ENCOUNTER: ICD-10-CM

## 2023-02-02 DIAGNOSIS — Q21.12 PATENT FORAMEN OVALE: Chronic | ICD-10-CM

## 2023-02-02 LAB
ANION GAP SERPL CALC-SCNC: 10 MMOL/L — SIGNIFICANT CHANGE UP (ref 5–17)
APTT BLD: 44.3 SEC — HIGH (ref 27.5–35.5)
BASOPHILS # BLD AUTO: 0.04 K/UL — SIGNIFICANT CHANGE UP (ref 0–0.2)
BASOPHILS NFR BLD AUTO: 0.8 % — SIGNIFICANT CHANGE UP (ref 0–2)
BLD GP AB SCN SERPL QL: SIGNIFICANT CHANGE UP
BUN SERPL-MCNC: 13.8 MG/DL — SIGNIFICANT CHANGE UP (ref 8–20)
CALCIUM SERPL-MCNC: 8.9 MG/DL — SIGNIFICANT CHANGE UP (ref 8.4–10.5)
CHLORIDE SERPL-SCNC: 102 MMOL/L — SIGNIFICANT CHANGE UP (ref 96–108)
CO2 SERPL-SCNC: 26 MMOL/L — SIGNIFICANT CHANGE UP (ref 22–29)
CREAT SERPL-MCNC: 0.92 MG/DL — SIGNIFICANT CHANGE UP (ref 0.5–1.3)
EGFR: 68 ML/MIN/1.73M2 — SIGNIFICANT CHANGE UP
EOSINOPHIL # BLD AUTO: 0.21 K/UL — SIGNIFICANT CHANGE UP (ref 0–0.5)
EOSINOPHIL NFR BLD AUTO: 4.1 % — SIGNIFICANT CHANGE UP (ref 0–6)
FLUAV AG NPH QL: SIGNIFICANT CHANGE UP
FLUBV AG NPH QL: SIGNIFICANT CHANGE UP
GLUCOSE SERPL-MCNC: 119 MG/DL — HIGH (ref 70–99)
HCT VFR BLD CALC: 37.2 % — SIGNIFICANT CHANGE UP (ref 34.5–45)
HGB BLD-MCNC: 12.2 G/DL — SIGNIFICANT CHANGE UP (ref 11.5–15.5)
IMM GRANULOCYTES NFR BLD AUTO: 0.4 % — SIGNIFICANT CHANGE UP (ref 0–0.9)
INR BLD: 0.96 RATIO — SIGNIFICANT CHANGE UP (ref 0.88–1.16)
LYMPHOCYTES # BLD AUTO: 2.19 K/UL — SIGNIFICANT CHANGE UP (ref 1–3.3)
LYMPHOCYTES # BLD AUTO: 43.2 % — SIGNIFICANT CHANGE UP (ref 13–44)
MCHC RBC-ENTMCNC: 31.1 PG — SIGNIFICANT CHANGE UP (ref 27–34)
MCHC RBC-ENTMCNC: 32.8 GM/DL — SIGNIFICANT CHANGE UP (ref 32–36)
MCV RBC AUTO: 94.9 FL — SIGNIFICANT CHANGE UP (ref 80–100)
MONOCYTES # BLD AUTO: 0.39 K/UL — SIGNIFICANT CHANGE UP (ref 0–0.9)
MONOCYTES NFR BLD AUTO: 7.7 % — SIGNIFICANT CHANGE UP (ref 2–14)
MRSA PCR RESULT.: SIGNIFICANT CHANGE UP
NEUTROPHILS # BLD AUTO: 2.22 K/UL — SIGNIFICANT CHANGE UP (ref 1.8–7.4)
NEUTROPHILS NFR BLD AUTO: 43.8 % — SIGNIFICANT CHANGE UP (ref 43–77)
PLATELET # BLD AUTO: 196 K/UL — SIGNIFICANT CHANGE UP (ref 150–400)
POTASSIUM SERPL-MCNC: 4.1 MMOL/L — SIGNIFICANT CHANGE UP (ref 3.5–5.3)
POTASSIUM SERPL-SCNC: 4.1 MMOL/L — SIGNIFICANT CHANGE UP (ref 3.5–5.3)
PROTHROM AB SERPL-ACNC: 11.1 SEC — SIGNIFICANT CHANGE UP (ref 10.5–13.4)
RBC # BLD: 3.92 M/UL — SIGNIFICANT CHANGE UP (ref 3.8–5.2)
RBC # FLD: 12.5 % — SIGNIFICANT CHANGE UP (ref 10.3–14.5)
RSV RNA NPH QL NAA+NON-PROBE: SIGNIFICANT CHANGE UP
S AUREUS DNA NOSE QL NAA+PROBE: SIGNIFICANT CHANGE UP
SARS-COV-2 RNA SPEC QL NAA+PROBE: SIGNIFICANT CHANGE UP
SODIUM SERPL-SCNC: 138 MMOL/L — SIGNIFICANT CHANGE UP (ref 135–145)
WBC # BLD: 5.07 K/UL — SIGNIFICANT CHANGE UP (ref 3.8–10.5)
WBC # FLD AUTO: 5.07 K/UL — SIGNIFICANT CHANGE UP (ref 3.8–10.5)

## 2023-02-02 PROCEDURE — 27134 REVISE HIP JOINT REPLACEMENT: CPT | Mod: 78,52,LT

## 2023-02-02 PROCEDURE — 73502 X-RAY EXAM HIP UNI 2-3 VIEWS: CPT | Mod: 26,76,LT

## 2023-02-02 PROCEDURE — 93010 ELECTROCARDIOGRAM REPORT: CPT

## 2023-02-02 PROCEDURE — 27134 REVISE HIP JOINT REPLACEMENT: CPT | Mod: AS,78,52,LT

## 2023-02-02 PROCEDURE — 99285 EMERGENCY DEPT VISIT HI MDM: CPT

## 2023-02-02 PROCEDURE — 72170 X-RAY EXAM OF PELVIS: CPT | Mod: 26,59

## 2023-02-02 PROCEDURE — 88300 SURGICAL PATH GROSS: CPT | Mod: 26

## 2023-02-02 DEVICE — IMPLANTABLE DEVICE: Type: IMPLANTABLE DEVICE | Status: FUNCTIONAL

## 2023-02-02 RX ORDER — PANTOPRAZOLE SODIUM 20 MG/1
40 TABLET, DELAYED RELEASE ORAL
Refills: 0 | Status: DISCONTINUED | OUTPATIENT
Start: 2023-02-02 | End: 2023-02-03

## 2023-02-02 RX ORDER — APREPITANT 80 MG/1
40 CAPSULE ORAL ONCE
Refills: 0 | Status: COMPLETED | OUTPATIENT
Start: 2023-02-02 | End: 2023-02-02

## 2023-02-02 RX ORDER — TRANEXAMIC ACID 100 MG/ML
1000 INJECTION, SOLUTION INTRAVENOUS ONCE
Refills: 0 | Status: DISCONTINUED | OUTPATIENT
Start: 2023-02-02 | End: 2023-02-02

## 2023-02-02 RX ORDER — SODIUM CHLORIDE 9 MG/ML
500 INJECTION INTRAMUSCULAR; INTRAVENOUS; SUBCUTANEOUS ONCE
Refills: 0 | Status: DISCONTINUED | OUTPATIENT
Start: 2023-02-02 | End: 2023-02-03

## 2023-02-02 RX ORDER — FENTANYL CITRATE 50 UG/ML
25 INJECTION INTRAVENOUS
Refills: 0 | Status: DISCONTINUED | OUTPATIENT
Start: 2023-02-02 | End: 2023-02-02

## 2023-02-02 RX ORDER — ACETAMINOPHEN 500 MG
1000 TABLET ORAL ONCE
Refills: 0 | Status: DISCONTINUED | OUTPATIENT
Start: 2023-02-02 | End: 2023-02-02

## 2023-02-02 RX ORDER — ONDANSETRON 8 MG/1
4 TABLET, FILM COATED ORAL EVERY 6 HOURS
Refills: 0 | Status: DISCONTINUED | OUTPATIENT
Start: 2023-02-02 | End: 2023-02-03

## 2023-02-02 RX ORDER — CEFAZOLIN SODIUM 1 G
2000 VIAL (EA) INJECTION ONCE
Refills: 0 | Status: DISCONTINUED | OUTPATIENT
Start: 2023-02-02 | End: 2023-02-02

## 2023-02-02 RX ORDER — CELECOXIB 200 MG/1
400 CAPSULE ORAL ONCE
Refills: 0 | Status: COMPLETED | OUTPATIENT
Start: 2023-02-02 | End: 2023-02-02

## 2023-02-02 RX ORDER — POVIDONE-IODINE 5 %
1 AEROSOL (ML) TOPICAL ONCE
Refills: 0 | Status: DISCONTINUED | OUTPATIENT
Start: 2023-02-02 | End: 2023-02-02

## 2023-02-02 RX ORDER — ACETAMINOPHEN 500 MG
975 TABLET ORAL ONCE
Refills: 0 | Status: COMPLETED | OUTPATIENT
Start: 2023-02-02 | End: 2023-02-02

## 2023-02-02 RX ORDER — ASPIRIN/CALCIUM CARB/MAGNESIUM 324 MG
325 TABLET ORAL
Refills: 0 | Status: DISCONTINUED | OUTPATIENT
Start: 2023-02-02 | End: 2023-02-03

## 2023-02-02 RX ORDER — VANCOMYCIN HCL 1 G
1000 VIAL (EA) INTRAVENOUS ONCE
Refills: 0 | Status: DISCONTINUED | OUTPATIENT
Start: 2023-02-02 | End: 2023-02-03

## 2023-02-02 RX ORDER — OXYCODONE HYDROCHLORIDE 5 MG/1
10 TABLET ORAL
Refills: 0 | Status: DISCONTINUED | OUTPATIENT
Start: 2023-02-02 | End: 2023-02-02

## 2023-02-02 RX ORDER — HYDROMORPHONE HYDROCHLORIDE 2 MG/ML
0.5 INJECTION INTRAMUSCULAR; INTRAVENOUS; SUBCUTANEOUS
Refills: 0 | Status: DISCONTINUED | OUTPATIENT
Start: 2023-02-02 | End: 2023-02-02

## 2023-02-02 RX ORDER — SODIUM CHLORIDE 9 MG/ML
1000 INJECTION INTRAMUSCULAR; INTRAVENOUS; SUBCUTANEOUS
Refills: 0 | Status: DISCONTINUED | OUTPATIENT
Start: 2023-02-02 | End: 2023-02-02

## 2023-02-02 RX ORDER — KETOROLAC TROMETHAMINE 30 MG/ML
15 SYRINGE (ML) INJECTION EVERY 6 HOURS
Refills: 0 | Status: COMPLETED | OUTPATIENT
Start: 2023-02-02 | End: 2023-02-03

## 2023-02-02 RX ORDER — POLYETHYLENE GLYCOL 3350 17 G/17G
17 POWDER, FOR SOLUTION ORAL AT BEDTIME
Refills: 0 | Status: DISCONTINUED | OUTPATIENT
Start: 2023-02-02 | End: 2023-02-03

## 2023-02-02 RX ORDER — PROPOFOL 10 MG/ML
70 INJECTION, EMULSION INTRAVENOUS ONCE
Refills: 0 | Status: COMPLETED | OUTPATIENT
Start: 2023-02-02 | End: 2023-02-02

## 2023-02-02 RX ORDER — OXYCODONE AND ACETAMINOPHEN 5; 325 MG/1; MG/1
1 TABLET ORAL EVERY 4 HOURS
Refills: 0 | Status: DISCONTINUED | OUTPATIENT
Start: 2023-02-02 | End: 2023-02-03

## 2023-02-02 RX ORDER — ACETAMINOPHEN 500 MG
975 TABLET ORAL EVERY 8 HOURS
Refills: 0 | Status: DISCONTINUED | OUTPATIENT
Start: 2023-02-02 | End: 2023-02-02

## 2023-02-02 RX ORDER — SODIUM CHLORIDE 9 MG/ML
1000 INJECTION INTRAMUSCULAR; INTRAVENOUS; SUBCUTANEOUS
Refills: 0 | Status: DISCONTINUED | OUTPATIENT
Start: 2023-02-02 | End: 2023-02-03

## 2023-02-02 RX ORDER — OXYCODONE HYDROCHLORIDE 5 MG/1
5 TABLET ORAL
Refills: 0 | Status: DISCONTINUED | OUTPATIENT
Start: 2023-02-02 | End: 2023-02-02

## 2023-02-02 RX ORDER — ALPRAZOLAM 0.25 MG
1 TABLET ORAL THREE TIMES A DAY
Refills: 0 | Status: DISCONTINUED | OUTPATIENT
Start: 2023-02-02 | End: 2023-02-03

## 2023-02-02 RX ORDER — MAGNESIUM HYDROXIDE 400 MG/1
30 TABLET, CHEWABLE ORAL DAILY
Refills: 0 | Status: DISCONTINUED | OUTPATIENT
Start: 2023-02-02 | End: 2023-02-03

## 2023-02-02 RX ORDER — ACETAMINOPHEN 500 MG
650 TABLET ORAL EVERY 6 HOURS
Refills: 0 | Status: DISCONTINUED | OUTPATIENT
Start: 2023-02-02 | End: 2023-02-02

## 2023-02-02 RX ORDER — SIMVASTATIN 20 MG/1
40 TABLET, FILM COATED ORAL AT BEDTIME
Refills: 0 | Status: DISCONTINUED | OUTPATIENT
Start: 2023-02-02 | End: 2023-02-03

## 2023-02-02 RX ORDER — CELECOXIB 200 MG/1
200 CAPSULE ORAL EVERY 12 HOURS
Refills: 0 | Status: CANCELLED | OUTPATIENT
Start: 2023-02-04 | End: 2023-02-03

## 2023-02-02 RX ORDER — SODIUM CHLORIDE 9 MG/ML
1000 INJECTION, SOLUTION INTRAVENOUS
Refills: 0 | Status: DISCONTINUED | OUTPATIENT
Start: 2023-02-02 | End: 2023-02-02

## 2023-02-02 RX ORDER — NICOTINE POLACRILEX 2 MG
1 GUM BUCCAL DAILY
Refills: 0 | Status: DISCONTINUED | OUTPATIENT
Start: 2023-02-02 | End: 2023-02-03

## 2023-02-02 RX ORDER — MORPHINE SULFATE 50 MG/1
4 CAPSULE, EXTENDED RELEASE ORAL ONCE
Refills: 0 | Status: DISCONTINUED | OUTPATIENT
Start: 2023-02-02 | End: 2023-02-02

## 2023-02-02 RX ORDER — CHLORHEXIDINE GLUCONATE 213 G/1000ML
1 SOLUTION TOPICAL
Refills: 0 | Status: DISCONTINUED | OUTPATIENT
Start: 2023-02-02 | End: 2023-02-02

## 2023-02-02 RX ORDER — HYDROMORPHONE HYDROCHLORIDE 2 MG/ML
4 INJECTION INTRAMUSCULAR; INTRAVENOUS; SUBCUTANEOUS EVERY 4 HOURS
Refills: 0 | Status: DISCONTINUED | OUTPATIENT
Start: 2023-02-02 | End: 2023-02-03

## 2023-02-02 RX ORDER — MUPIROCIN 20 MG/G
1 OINTMENT TOPICAL
Refills: 0 | Status: DISCONTINUED | OUTPATIENT
Start: 2023-02-02 | End: 2023-02-02

## 2023-02-02 RX ORDER — QUETIAPINE FUMARATE 200 MG/1
25 TABLET, FILM COATED ORAL DAILY
Refills: 0 | Status: DISCONTINUED | OUTPATIENT
Start: 2023-02-02 | End: 2023-02-03

## 2023-02-02 RX ORDER — CEFAZOLIN SODIUM 1 G
2000 VIAL (EA) INJECTION
Refills: 0 | Status: COMPLETED | OUTPATIENT
Start: 2023-02-02 | End: 2023-02-03

## 2023-02-02 RX ORDER — INFLUENZA VIRUS VACCINE 15; 15; 15; 15 UG/.5ML; UG/.5ML; UG/.5ML; UG/.5ML
0.7 SUSPENSION INTRAMUSCULAR ONCE
Refills: 0 | Status: DISCONTINUED | OUTPATIENT
Start: 2023-02-02 | End: 2023-02-03

## 2023-02-02 RX ORDER — SENNA PLUS 8.6 MG/1
2 TABLET ORAL AT BEDTIME
Refills: 0 | Status: DISCONTINUED | OUTPATIENT
Start: 2023-02-02 | End: 2023-02-03

## 2023-02-02 RX ADMIN — Medication 1 PATCH: at 22:33

## 2023-02-02 RX ADMIN — HYDROMORPHONE HYDROCHLORIDE 0.5 MILLIGRAM(S): 2 INJECTION INTRAMUSCULAR; INTRAVENOUS; SUBCUTANEOUS at 17:06

## 2023-02-02 RX ADMIN — Medication 15 MILLIGRAM(S): at 23:30

## 2023-02-02 RX ADMIN — Medication 325 MILLIGRAM(S): at 18:18

## 2023-02-02 RX ADMIN — SODIUM CHLORIDE 100 MILLILITER(S): 9 INJECTION INTRAMUSCULAR; INTRAVENOUS; SUBCUTANEOUS at 18:18

## 2023-02-02 RX ADMIN — HYDROMORPHONE HYDROCHLORIDE 0.5 MILLIGRAM(S): 2 INJECTION INTRAMUSCULAR; INTRAVENOUS; SUBCUTANEOUS at 17:03

## 2023-02-02 RX ADMIN — HYDROMORPHONE HYDROCHLORIDE 0.5 MILLIGRAM(S): 2 INJECTION INTRAMUSCULAR; INTRAVENOUS; SUBCUTANEOUS at 16:43

## 2023-02-02 RX ADMIN — Medication 2000 MILLIGRAM(S): at 21:53

## 2023-02-02 RX ADMIN — PROPOFOL 70 MILLIGRAM(S): 10 INJECTION, EMULSION INTRAVENOUS at 05:57

## 2023-02-02 RX ADMIN — APREPITANT 40 MILLIGRAM(S): 80 CAPSULE ORAL at 12:05

## 2023-02-02 RX ADMIN — CELECOXIB 400 MILLIGRAM(S): 200 CAPSULE ORAL at 12:06

## 2023-02-02 RX ADMIN — CHLORHEXIDINE GLUCONATE 1 APPLICATION(S): 213 SOLUTION TOPICAL at 11:12

## 2023-02-02 RX ADMIN — POLYETHYLENE GLYCOL 3350 17 GRAM(S): 17 POWDER, FOR SOLUTION ORAL at 21:56

## 2023-02-02 RX ADMIN — SIMVASTATIN 40 MILLIGRAM(S): 20 TABLET, FILM COATED ORAL at 21:54

## 2023-02-02 RX ADMIN — Medication 15 MILLIGRAM(S): at 23:31

## 2023-02-02 RX ADMIN — SENNA PLUS 2 TABLET(S): 8.6 TABLET ORAL at 21:56

## 2023-02-02 RX ADMIN — Medication 15 MILLIGRAM(S): at 18:19

## 2023-02-02 RX ADMIN — SODIUM CHLORIDE 125 MILLILITER(S): 9 INJECTION INTRAMUSCULAR; INTRAVENOUS; SUBCUTANEOUS at 07:10

## 2023-02-02 RX ADMIN — Medication 975 MILLIGRAM(S): at 12:05

## 2023-02-02 RX ADMIN — MORPHINE SULFATE 4 MILLIGRAM(S): 50 CAPSULE, EXTENDED RELEASE ORAL at 05:19

## 2023-02-02 NOTE — DISCHARGE NOTE NURSING/CASE MANAGEMENT/SOCIAL WORK - NSDCPEFALRISK_GEN_ALL_CORE
For information on Fall & Injury Prevention, visit: [email or web address] OR  [email or web address] OR  https://www.cdc.gov/steadi/patient.html

## 2023-02-02 NOTE — DISCHARGE NOTE PROVIDER - NSDCFUADDAPPT_GEN_ALL_CORE_FT
For Constipation :   • Increase your water intake. Drink at least 8 glasses of water daily.  • Try adding fiber to your diet by eating fruits, vegetables and foods that are rich in grains.  • If you do experience constipation, you may take an over-the-counter stool softener/laxative such as Colace, Senokot or Milk of Magnesia.

## 2023-02-02 NOTE — DISCHARGE NOTE PROVIDER - NSDCFUADDINST_GEN_ALL_CORE_FT
The patient will be seen in the office between 2-3 weeks for wound check.   **Your first post-operative visit has been scheduled prior to your admission. PLEASE CONTACT OFFICE TO CONFIRM THE APPOINTMENT DATE. Tape will be removed at that time.  **  The silver based dressing is to be removed 7 days from the date of surgery.   ** CONTACT THE OFFICE IF THE FOLLOWING DEVELOP:  - the dressing becomes soiled or saturated  - you develop a fever greater that 101F  - the wound becomes red or you develop blistering around the wound  * Patient may shower after post-op day #3.   * The patient will continue home PT consistent with posterior total hip replacement protocol and will continue to practice posterior total hip precautions for a minimum of 6 week. Transition to outpatient PT will occur at the time of the first office visit.   * The patient is FULL weight bearing.  * The patient will continue ASPIRIN for 6 weeks after surgery for blood clot prevention.  * While on aspirin, the patient will take daily omeprazole or other similar medication to protect the stomach from irritation.   * The patient will take OXYCODONE AND TYLENOL for pain control and adjust according to prescription and patient needs. Contact the office if pain increases while taking prescribed pain medications or related concerns develop.  * Celebrex will be taken twice daily for 3 weeks for pain control and prevention of excessive bone growth. Additional prescription may be requested at your office follow-up visit.  * The patient will take Senna S while taking oxycodone to prevent narcotic associated constipation.  Additionally, increase water intake (drink at least 8 glasses of water daily) and try adding fiber to the diet by eating fruits, vegetables and foods that are rich in grains. If constipation is experienced, contact the medical/primary care provider to discuss further treatment options.  * To avoid injury at home:  - continue use of rolling walker until cleared by physical therapist  - have family or friend remove all throw rug or objects in hallways that may present a trip hazard.  - if you experience any dizziness or medical concerns, call your medical doctor or 911.  * The implant may activate metal detection devices.  The patient will be seen in the office between 2-3 weeks for wound check.   **Your first post-operative visit has been scheduled prior to your admission. PLEASE CONTACT OFFICE TO CONFIRM THE APPOINTMENT DATE. Tape will be removed at that time.  **  The silver based dressing is to be removed 7 days from the date of surgery (2/9).   ** CONTACT THE OFFICE IF THE FOLLOWING DEVELOP:  - the dressing becomes soiled or saturated  - you develop a fever greater that 101F  - the wound becomes red or you develop blistering around the wound  * Patient may shower after post-op day #3.   * The patient will continue home PT consistent with posterior total hip replacement protocol and will continue to practice posterior total hip precautions for a minimum of 6 week. Transition to outpatient PT will occur at the time of the first office visit.   * The patient is FULL weight bearing.  * The patient will continue ASPIRIN for 6 weeks after surgery for blood clot prevention.  * While on aspirin, the patient will take daily omeprazole or other similar medication to protect the stomach from irritation.   * The patient will take PERCOCET for pain control and adjust according to prescription and patient needs. Contact the office if pain increases while taking prescribed pain medications or related concerns develop.  * Celebrex will be taken twice daily for 3 weeks for pain control and prevention of excessive bone growth. Additional prescription may be requested at your office follow-up visit.  * The patient will take Senna S while taking oxycodone to prevent narcotic associated constipation.  Additionally, increase water intake (drink at least 8 glasses of water daily) and try adding fiber to the diet by eating fruits, vegetables and foods that are rich in grains. If constipation is experienced, contact the medical/primary care provider to discuss further treatment options.  * To avoid injury at home:  - continue use of rolling walker until cleared by physical therapist  - have family or friend remove all throw rug or objects in hallways that may present a trip hazard.  - if you experience any dizziness or medical concerns, call your medical doctor or 911.  * The implant may activate metal detection devices.

## 2023-02-02 NOTE — DISCHARGE NOTE PROVIDER - NSDCFUSCHEDAPPT_GEN_ALL_CORE_FT
Elebiary, Yeon J  [facility]  ORTHOSURG [address]  Scheduled Appointment: 02/28/2023    German Guerrero  [facility]  ORTHOSURG [address]  Scheduled Appointment: 03/29/2023

## 2023-02-02 NOTE — DISCHARGE NOTE PROVIDER - HOSPITAL COURSE
The patient underwent a LEFT POSTERIOR TOTAL HIP REVISION on 2/2. The patient received antibiotics consistent with SCIP guidelines. The patient underwent the procedure and had no intra-operative complications. Post-operatively, the patient was seen by medicine and PT. The patient received ASPIRIN for DVTP. The patient received pain medications per orthopedic pain management pathway and the pain was appropriately controlled. Patient was instructed on posterior total hip precautions by PT. The patient did not have any post-operative medical complications. The patient was discharged in stable condition.

## 2023-02-02 NOTE — DISCHARGE NOTE NURSING/CASE MANAGEMENT/SOCIAL WORK - NSDCPEEMAIL_GEN_ALL_CORE
[facility] for Tobacco Control email [email or web address]

## 2023-02-02 NOTE — ED ADULT NURSE NOTE - OBJECTIVE STATEMENT
pt presents to ed with L hip dislocation.  pt states she was in the bathroom already seated on the toilet and she went to grab toilet paper and popped her hip.  pt with hip replacement surgeries and recurrent dislocations.   pt medicated per MAR for pain.  awaiting xrays.  NAD at this time. pt presents to ed with L hip dislocation.  pt states she was in the bathroom already seated on the toilet and she went to grab toilet paper and popped her hip.  pt with hip replacement surgeries and recurrent dislocations.  surgical site from most recent surgery cdi.  pt medicated per MAR for pain.  awaiting xrays.  NAD at this time.

## 2023-02-02 NOTE — ED ADULT NURSE NOTE - NSICDXPASTSURGICALHX_GEN_ALL_CORE_FT
PAST SURGICAL HISTORY:  H/O melanoma excision     H/O neck surgery     H/O total knee replacement 2015- Right knee, left knee    PFO (patent foramen ovale)     Pilonidal cyst     S/P dilatation and curettage x2    S/P hip replacement, left     S/P knee surgery right x5 meniscus

## 2023-02-02 NOTE — PATIENT PROFILE ADULT - FUNCTIONAL ASSESSMENT - BASIC MOBILITY 6.
I will SWITCH the dose or number of times a day I take the medications listed below when I get home from the hospital:  None  3-calculated by average/Not able to assess (calculate score using [facility] averaging method)

## 2023-02-02 NOTE — PHYSICAL THERAPY INITIAL EVALUATION ADULT - PERTINENT HX OF CURRENT PROBLEM, REHAB EVAL
present to ED with Left hip pain while using toilet this morning 10/10 pain. Patient is s/p Left hip revision 1/6/2023 with Dr. Guerrero  due to multiple multiple dislocations. Pt had surgery / revision in 8/202 . Last dislocation in NOv 2022. X-ray + for dislocation. now s/p Left RITIKA Revision

## 2023-02-02 NOTE — PATIENT PROFILE ADULT - FALL HARM RISK - HARM RISK INTERVENTIONS

## 2023-02-02 NOTE — H&P ADULT - NS ATTEND AMEND GEN_ALL_CORE FT
Plan for revision to constrained liner from multiple dislocations and failure to comply with posterior precautions

## 2023-02-02 NOTE — PATIENT PROFILE ADULT - DEAF OR HARD OF HEARING?
NEPHROLOGY PROGRESS NOTE   Riley Espinoza 61 y o  male MRN: [id number]  Unit/Bed#: [facility] 507-01 Encounter: [id number]  Reason for Consult: PATRICK    ASSESSMENT/PLAN:  1  Acute kidney injury:  Suspect secondary to ATN from sepsis/shock  -now hemodialysis dependent  - he reports no urine output since yesterday  -will continue to trend for renal recovery  -will plan on hemodialysis tomorrow as scheduled  -case management following for [facility] [address] [facility] at 10:45 chir time  -continue trend I/O, lab values in volume status    2  Cardiogenic and septic shock    3  MSSA bacteremia:  Continues on IV Ancef for total of six weeks with last dose scheduled 03/10/2019  -id following    4  Cardiomyopathy:  A with EF of 30%   -will continue to UF as blood pressure will allow  -continue with low-sodium diet along with fluid restriction  -continues on torsemide 80 mg daily    5  Atrial fibrillation:  With history of AVR-no evidence of vegetation    6  Anemia of Chronic Kidney Disease:  -hemoglobin slowly decreasing and now 7 0  -transfuse p r n  for hemoglobin less than 7 0  -on IV Venofer 200 mg 3 times weekly with dialysis for five doses for low iron stores    7  Mineral bone disease:  Remains on PhosLo for hyperphosphatemia  -will continue trend phosphorus and PTH as outpatient  -last phosphorus 8 9 on 02/12/2019    8  Hyponatremia:  Likely volume mediated  -slowly improving and now 130 to with volume removal with UF  -continue to trend        SUBJECTIVE:  Patient seen and examined  Denies chest pain or shortness of Breath  Reports not making urine since yesterday    Denies nausea vomiting    OBJECTIVE:  Current Weight: Weight - Scale: (!) 155 kg (342 lb 6 oz)  Vitals:    02/19/19 0145 02/19/19 0531 02/19/19 0719 02/19/19 1040   BP: 103/56  112/60 116/62   BP Location:   Left arm Left arm   Pulse: 67  66 67   Resp: 18  17 18   Temp: 98 7 °F (37 1 °C)  98 3 °F (36 8 °C) 98 3 °F (36 8 °C)   TempSrc:   Axillary Axillary   SpO2: 99%   99%   Weight:  (!) 155 kg (342 lb 6 oz)     Height:           Intake/Output Summary (Last 24 hours) at 2/19/2019 1228  Last data filed at 2/19/2019 1159  Gross per 24 hour   Intake 1100 ml   Output 2500 ml   Net -1400 ml     General:  No acute distress, resting in bed, cooperative, being fed  Skin:  Warm and dry without rash  HEENT:  Mucous membranes moist, sclera anicteric  Neck:  Supple without JVD noted  Chest:  Essentially clear on auscultation, decreased in the bases    No noted crackles, rhonchi, wheezes  Heart:  Regular rate and rhythm without rub  Abdomen:  Large, soft, nontender, active bowel sounds  Extremities:  Bilateral lower extremity edema +2  Neuro:  Alert awake and oriented  Psych:  Appropriate affect    Medications:    Current Facility-Administered Medications:     acetaminophen (TYLENOL) tablet 650 mg, 650 mg, Oral, Q6H PRN, Abhijeet Lau MD, 650 mg at 02/13/19 1732    amiodarone tablet 200 mg, 200 mg, Oral, Daily With Breakfast, Yanet Villagomez MD, 200 mg at 02/19/19 0825    aspirin (ECOTRIN LOW STRENGTH) EC tablet 81 mg, 81 mg, Oral, Daily, Tierraxochitl Harris, DO, 81 mg at 02/19/19 0824    atorvastatin (LIPITOR) tablet 40 mg, 40 mg, Oral, Daily With Yamilet November, DO, 40 mg at 02/18/19 1547    bisacodyl (DULCOLAX) rectal suppository 10 mg, 10 mg, Rectal, Daily PRN, Jerald Batista PA-C    calcium acetate (PHOSLO) capsule 1,334 mg, 1,334 mg, Oral, TID With Meals, Ky Jessica Quintanilla DO, 1,334 mg at 02/19/19 1113    diphenhydrAMINE (BENADRYL) tablet 25 mg, 25 mg, Oral, Q6H PRN, Tierra Harris DO, 25 mg at 02/18/19 1546    guaiFENesin (MUCINEX) 12 hr tablet 600 mg, 600 mg, Oral, Q12H [address], Olive Umanzor PA-C, 600 mg at 02/19/19 0825    hydrocortisone 1 % cream, , Topical, BID, Tierra Harris DO    HYDROmorphone (DILAUDID) injection 1 mg, 1 mg, Intravenous, Q3H PRN, Lidya Martinez, 1 mg at 02/11/19 1600    iron sucrose (VENOFER) 200 mg in sodium chloride 0 9 % 100 mL IVPB, 200 mg, Intravenous, Once per day on Mon Wed Fri, Keeley Ohms DO, Last Rate: 110 mL/hr at 02/18/19 1231, 200 mg at 02/18/19 1231    metoprolol (LOPRESSOR) injection 5 mg, 5 mg, Intravenous, Q6H PRN, Ronaldo Ochoa, DO, 5 mg at 02/11/19 1559    metoprolol succinate (TOPROL-XL) 24 hr tablet 100 mg, 100 mg, Oral, Q12H, Sukhjinder Perry DO, 100 mg at 02/19/19 0825    nystatin (MYCOSTATIN) powder, , Topical, BID, Saba Peterson PA-C    oxyCODONE (ROXICODONE) immediate release tablet 10 mg, 10 mg, Oral, Q4H PRN, Merleen Jeans, MD, 10 mg at 02/19/19 0846    polyethylene glycol (MIRALAX) packet 17 g, 17 g, Oral, Daily, KATHY Mathias, 17 g at 02/17/19 1036    polyvinyl alcohol (LIQUIFILM TEARS) 1 4 % ophthalmic solution 1 drop, 1 drop, Both Eyes, Q3H PRN, Birtha Skiff, PA-C, 1 drop at 02/13/19 1724    QUEtiapine (SEROquel) tablet 25 mg, 25 mg, Oral, HS, Birtha Skiff, PA-C, 25 mg at 02/18/19 2309    senna-docusate sodium (SENOKOT S) 8 6-50 mg per tablet 1 tablet, 1 tablet, Oral, BID, Kathrine Simon DO, 1 tablet at 02/17/19 1035    torsemide (DEMADEX) tablet 80 mg, 80 mg, Oral, Daily, Juliene Scheuermann Dunn, DO, 80 mg at 02/19/19 2496    vancomycin (VANCOCIN) IVPB (premix) 1,000 mg, 10 mg/kg (Adjusted), Intravenous, After Dialysis, Demian Story MD    warfarin (COUMADIN) tablet 2 mg, 2 mg, Oral, Daily (warfarin), Kathrine Simon DO, 2 mg at 02/18/19 1831    Laboratory Results:  Results from last 7 days   Lab Units 02/18/19  0909 02/17/19  0704 02/15/19  1358 02/13/19  0509   WBC Thousand/uL 10 54* 9 42  --   --    HEMOGLOBIN g/dL 7 0* 7 5*  --   --    HEMATOCRIT % 23 1* 25 3*  --   --    PLATELETS Thousands/uL 238 239  --   --    POTASSIUM mmol/L  --   --  4 2 4 9   CHLORIDE mmol/L  --   --  93* 92*   CO2 mmol/L  --   --  25 22   BUN mg/dL  --   --  93* 101*   CREATININE mg/dL  --   --  7 14* 7 74*   CALCIUM mg/dL --   --  8 2* 7 6* no

## 2023-02-02 NOTE — ED PROVIDER NOTE - CLINICAL SUMMARY MEDICAL DECISION MAKING FREE TEXT BOX
Left hip prosthetic dislocation reduced under procedural sedation without complication, LLE NVI throughout, being admitted to orthopedics for revision

## 2023-02-02 NOTE — PHYSICAL THERAPY INITIAL EVALUATION ADULT - RANGE OF MOTION EXAMINATION, REHAB EVAL
left hip AROM assessed/limited to within precautions; otherwise WNL/Right LE ROM was WNL (within normal limits)/deficits as listed below

## 2023-02-02 NOTE — H&P ADULT - HISTORY OF PRESENT ILLNESS
Pt Name: TRISHA LOVE    MRN: [id number]    Patient is a 68y Female PMHx of  anxiety , neck pain, back pain , melanoma and arthritis  present to ED with Left hip pain while using toilet this morning 10/10 pain. Patient is s/p Left hip revision 1/6/2023 with Dr. Guerrero  due to multiple multiple dislocations. Pt had surgery / revision in 8/202 . Last dislocation in NOv 2022. Ambulates without assistance. Patient was compliant with DVTp ASA 325mg BID.  Patient denies other orthopedic complaints at present time.     REVIEW OF SYSTEMS    General: Alert, responsive, in NAD    Respiratory and Thorax: No difficulty breathing. No cough.  	   Cardiovascular:	No chest pain. No palpitations.    Genitourinary: No dysuria. No bleeding.    Musculoskeletal: SEE HPI.    Neurological: No sensory or motor changes.     Endocrine: No Hx of diabetes.    ROS is otherwise negative.      Allergies: No Known Allergies      Medications: propofol Injectable 70 milliGRAM(s) IV Push once      FAMILY HISTORY:  Family history of diabetes mellitus (Sibling, Uncle, Sibling)    Family history of coronary artery disease (Father)  : non-contributory  Social History:   Ambulation: Walking independently [X ] With Cane [ ] With Walker [ ]  Bedbound [ ]                           12.2   5.07  )-----------( 196      ( 02 Feb 2023 05:15 )             37.2       02-02    138  |  102  |  13.8  ----------------------------<  119<H>  4.1   |  26.0  |  0.92    Ca    8.9      02 Feb 2023 05:15        Vital Signs Last 24 Hrs  T(C): 36.4 (02 Feb 2023 05:02), Max: 36.4 (02 Feb 2023 05:02)  T(F): 97.6 (02 Feb 2023 05:02), Max: 97.6 (02 Feb 2023 05:02)  HR: 70 (02 Feb 2023 05:02) (70 - 70)  BP: 155/78 (02 Feb 2023 05:02) (155/78 - 155/78)  RR: 16 (02 Feb 2023 05:02) (16 - 16)  SpO2: 95% (02 Feb 2023 05:02) (95% - 95%)    Parameters below as of 02 Feb 2023 05:02  Patient On (Oxygen Delivery Method): room air      Daily Height in cm: 162.56 (02 Feb 2023 05:02)    Daily       PHYSICAL EXAM:    Appearance: Alert, responsive, in no acute distress.    Neurological: Sensation is grossly intact to light touch. No focal deficits or weaknesses found.    Skin: Skin is clean, dry and intact. No bleeding. No abrasions. No ulcerations.    Vascular: 2+ Distal pulses. Cap refill < 2 sec. No signs of venous insufficiency or stasis. No extremity ulcerations. No cyanosis.    Musculoskeletal:         Left Lower Extremity: Internally rotated - SLR.  + dorsi plantar flexion. EHL, FHL intact. No bony tenderness. Compartments Soft, compressible, nontender. Denies Numbness, tingling, other orthopedic complaints.       Right Lower Extremity:  + dorsi plantar flexion. EHL, FHL intact. Active FROM without elicited pain. No bony tenderness. Compartments Soft, compressible, nontender. Denies Numbness, tingling, other orthopedic complaints.    Imaging Studies:  Xray pelvis/hip Left hip dislocation    JOINT REDUCTION  PROCEDURE NOTE: Joint reduction     Performed by: Slava Linares PA-C    Indication: Dislocation of  Left Hip    Consent: The risks and benefits of the procedure including incomplete reduction, secondary fracture, nerve damage and bleeding were explained and the patient verbalized their understanding and wished to proceed with the procedure. Written consent was obtained following the discussion.    Universal Protocol: a time out was performed and the correct patient and site were verified     Procedure: Neurovascular exam intact prior to joint reduction.  Skin exam: no bleeding or lacerations at the fracture site. Conscious sedation performed by emergency department attending physician. Anesthesia/pain control, using aseptic technique, with conscious sedation. Reduction of the Left hip was accomplished via axial traction and careful manipulation.  The joint was immobilized with abductio pillow.  Distally, the extremity was neurovascular intact following the procedure. The patient tolerated the procedure well.    Post reduction films obtained and demonstrated an adequate reduction.    Complications: None         A/P:  Pt is a  68y Female found to have Left hip dislocation     PLAN:   * D/w Dr. Guerrero  * NPO for OR today  * IV fluids ordered and to start once NPO  * Pre-operative ABX ordered  * Routine daily anticoagulation held for OR  * Medical Optimization   * Bed rest

## 2023-02-02 NOTE — ED ADULT TRIAGE NOTE - CHIEF COMPLAINT QUOTE
BIB EMS with possible L hip dislocation.  Pt reports in bathroom, bent down to get toliet paper and hip "popped out."  Hx of hip replacement/dislocation in past.  Pt reports + chronic wound to area.

## 2023-02-02 NOTE — DISCHARGE NOTE PROVIDER - CARE PROVIDER_API CALL
German Guerrero)  Orthopaedic Surgery  [address] [place] [address]  Phone: [phone]  Fax: [phone]  Follow Up Time:
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
## 2023-02-02 NOTE — PHYSICAL THERAPY INITIAL EVALUATION ADULT - GENERAL OBSERVATIONS, REHAB EVAL
Pt received in bed, + IV Loc, +hip abd pillow + VCBs, in 9/10 left hip pain, agreeable to PT evaluation

## 2023-02-02 NOTE — DISCHARGE NOTE NURSING/CASE MANAGEMENT/SOCIAL WORK - NSDCPEWEB_GEN_ALL_CORE
[facility] for Tobacco Control website --- [email or web address] website --- [email or web address]

## 2023-02-02 NOTE — DISCHARGE NOTE NURSING/CASE MANAGEMENT/SOCIAL WORK - PATIENT PORTAL LINK FT
You can access the FollowMyHealth Patient Portal offered by [facility] by registering at the following website: [email or web address]. By joining [employer]’s FollowMyHealth portal, you will also be able to view your health information using other applications (apps) compatible with our system.

## 2023-02-02 NOTE — ED ADULT NURSE REASSESSMENT NOTE - NS ED NURSE REASSESS COMMENT FT1
conscious sedation performed by ortho team at bedside with ED MD Skinner.  pt tolerated procedure well.  pt now a&o x3.  aware of OR today.  NPO status maintained.  NAD at this time.
assumed pt care at 0734.  Pt is awake and alert, talking on cellphone.  Pt aware of plan of care.  Side rails up. Awaiting holding room bed and/or OR transport.

## 2023-02-02 NOTE — DISCHARGE NOTE PROVIDER - NSDCMRMEDTOKEN_GEN_ALL_CORE_FT
ALPRAZolam 1 mg oral tablet: 1 tab(s) orally 3 times a day, As needed, anxiety  Aspirin Enteric Coated 325 mg oral delayed release tablet: 1 tab(s) orally 2 times a day MDD:2  CeleBREX 200 mg oral capsule: 1 cap(s) orally 2 times a day MDD:2  omeprazole 20 mg oral delayed release tablet: 1 tab(s) orally once a day MDD:1  oxycodone-acetaminophen 5 mg-325 mg oral tablet: 1 tab(s) orally every 6 hours as needed for pain MDD:4 tabs  QUEtiapine 25 mg oral tablet: 1 tab(s) orally once a day  Senna S 50 mg-8.6 mg oral tablet: 2 tab(s) orally once a day (at bedtime) MDD:2  simvastatin 40 mg oral tablet: 1 tab(s) orally once a day (at bedtime)  Vitamin D3 25 mcg (1000 intl units) oral tablet: 1 tab(s) orally once a day   ALPRAZolam 1 mg oral tablet: 1 tab(s) orally 3 times a day, As needed, anxiety  aspirin 325 mg oral delayed release tablet: 1 tab(s) orally 2 times a day until 3/16/23  CeleBREX 200 mg oral capsule: 1 cap(s) orally 2 times a day x 21 days MDD:2  omeprazole 20 mg oral delayed release capsule: 1 cap(s) orally once a day before breakfast. MDD:1  oxycodone-acetaminophen 5 mg-325 mg oral tablet: 1 tab(s) orally every 6 hours, As Needed -for severe pain MDD:4   QUEtiapine 25 mg oral tablet: 1 tab(s) orally once a day  Senna S 50 mg-8.6 mg oral tablet: 2 tab(s) orally once a day (at bedtime) -for constipation MDD:2  simvastatin 40 mg oral tablet: 1 tab(s) orally once a day (at bedtime)  Vitamin D3 25 mcg (1000 intl units) oral tablet: 1 tab(s) orally once a day

## 2023-02-02 NOTE — ED PROVIDER NOTE - OBJECTIVE STATEMENT
68yof left hip replacement s/p revision on 1/6 was on toilet this AM and turned to reach for something and felt the left hip dislocate. Severe pain with loss of ROM to the left hip.

## 2023-02-02 NOTE — DISCHARGE NOTE PROVIDER - NSDCCPCAREPLAN_GEN_ALL_CORE_FT
PRINCIPAL DISCHARGE DIAGNOSIS  Diagnosis: Dislocation of internal left hip prosthesis, init encntr  Assessment and Plan of Treatment: Community Status: Active

## 2023-02-03 VITALS
DIASTOLIC BLOOD PRESSURE: 89 MMHG | HEART RATE: 73 BPM | SYSTOLIC BLOOD PRESSURE: 150 MMHG | OXYGEN SATURATION: 95 % | TEMPERATURE: 98 F | RESPIRATION RATE: 18 BRPM

## 2023-02-03 LAB
ANION GAP SERPL CALC-SCNC: 8 MMOL/L — SIGNIFICANT CHANGE UP (ref 5–17)
BUN SERPL-MCNC: 9.3 MG/DL — SIGNIFICANT CHANGE UP (ref 8–20)
CALCIUM SERPL-MCNC: 8.5 MG/DL — SIGNIFICANT CHANGE UP (ref 8.4–10.5)
CHLORIDE SERPL-SCNC: 107 MMOL/L — SIGNIFICANT CHANGE UP (ref 96–108)
CO2 SERPL-SCNC: 25 MMOL/L — SIGNIFICANT CHANGE UP (ref 22–29)
CREAT SERPL-MCNC: 0.67 MG/DL — SIGNIFICANT CHANGE UP (ref 0.5–1.3)
EGFR: 95 ML/MIN/1.73M2 — SIGNIFICANT CHANGE UP
GLUCOSE SERPL-MCNC: 100 MG/DL — HIGH (ref 70–99)
HCT VFR BLD CALC: 35.5 % — SIGNIFICANT CHANGE UP (ref 34.5–45)
HGB BLD-MCNC: 11.7 G/DL — SIGNIFICANT CHANGE UP (ref 11.5–15.5)
MCHC RBC-ENTMCNC: 31.3 PG — SIGNIFICANT CHANGE UP (ref 27–34)
MCHC RBC-ENTMCNC: 33 GM/DL — SIGNIFICANT CHANGE UP (ref 32–36)
MCV RBC AUTO: 94.9 FL — SIGNIFICANT CHANGE UP (ref 80–100)
PLATELET # BLD AUTO: 168 K/UL — SIGNIFICANT CHANGE UP (ref 150–400)
POTASSIUM SERPL-MCNC: 4.2 MMOL/L — SIGNIFICANT CHANGE UP (ref 3.5–5.3)
POTASSIUM SERPL-SCNC: 4.2 MMOL/L — SIGNIFICANT CHANGE UP (ref 3.5–5.3)
RBC # BLD: 3.74 M/UL — LOW (ref 3.8–5.2)
RBC # FLD: 12.5 % — SIGNIFICANT CHANGE UP (ref 10.3–14.5)
SODIUM SERPL-SCNC: 140 MMOL/L — SIGNIFICANT CHANGE UP (ref 135–145)
WBC # BLD: 5.84 K/UL — SIGNIFICANT CHANGE UP (ref 3.8–10.5)
WBC # FLD AUTO: 5.84 K/UL — SIGNIFICANT CHANGE UP (ref 3.8–10.5)

## 2023-02-03 PROCEDURE — 85027 COMPLETE CBC AUTOMATED: CPT

## 2023-02-03 PROCEDURE — 73502 X-RAY EXAM HIP UNI 2-3 VIEWS: CPT

## 2023-02-03 PROCEDURE — 88300 SURGICAL PATH GROSS: CPT

## 2023-02-03 PROCEDURE — 36415 COLL VENOUS BLD VENIPUNCTURE: CPT

## 2023-02-03 PROCEDURE — 85610 PROTHROMBIN TIME: CPT

## 2023-02-03 PROCEDURE — 93005 ELECTROCARDIOGRAM TRACING: CPT

## 2023-02-03 PROCEDURE — 86850 RBC ANTIBODY SCREEN: CPT

## 2023-02-03 PROCEDURE — 87641 MR-STAPH DNA AMP PROBE: CPT

## 2023-02-03 PROCEDURE — 87640 STAPH A DNA AMP PROBE: CPT

## 2023-02-03 PROCEDURE — 86900 BLOOD TYPING SEROLOGIC ABO: CPT

## 2023-02-03 PROCEDURE — 86901 BLOOD TYPING SEROLOGIC RH(D): CPT

## 2023-02-03 PROCEDURE — 87637 SARSCOV2&INF A&B&RSV AMP PRB: CPT

## 2023-02-03 PROCEDURE — 73501 X-RAY EXAM HIP UNI 1 VIEW: CPT

## 2023-02-03 PROCEDURE — 72170 X-RAY EXAM OF PELVIS: CPT

## 2023-02-03 PROCEDURE — 85730 THROMBOPLASTIN TIME PARTIAL: CPT

## 2023-02-03 PROCEDURE — 80048 BASIC METABOLIC PNL TOTAL CA: CPT

## 2023-02-03 PROCEDURE — 99232 SBSQ HOSP IP/OBS MODERATE 35: CPT

## 2023-02-03 PROCEDURE — 85025 COMPLETE CBC W/AUTO DIFF WBC: CPT

## 2023-02-03 PROCEDURE — 96374 THER/PROPH/DIAG INJ IV PUSH: CPT

## 2023-02-03 PROCEDURE — 96375 TX/PRO/DX INJ NEW DRUG ADDON: CPT

## 2023-02-03 PROCEDURE — 99285 EMERGENCY DEPT VISIT HI MDM: CPT | Mod: 25

## 2023-02-03 PROCEDURE — C1776: CPT

## 2023-02-03 RX ORDER — ASPIRIN/CALCIUM CARB/MAGNESIUM 324 MG
1 TABLET ORAL
Qty: 0 | Refills: 0 | DISCHARGE
Start: 2023-02-03 | End: 2023-03-16

## 2023-02-03 RX ORDER — OXYCODONE AND ACETAMINOPHEN 5; 325 MG/1; MG/1
1 TABLET ORAL
Qty: 28 | Refills: 0
Start: 2023-02-03 | End: 2023-02-09

## 2023-02-03 RX ORDER — CELECOXIB 200 MG/1
1 CAPSULE ORAL
Qty: 42 | Refills: 0
Start: 2023-02-03 | End: 2023-02-23

## 2023-02-03 RX ORDER — SENNOSIDES/DOCUSATE SODIUM 8.6MG-50MG
2 TABLET ORAL
Qty: 14 | Refills: 0
Start: 2023-02-03 | End: 2023-02-09

## 2023-02-03 RX ORDER — OMEPRAZOLE 10 MG/1
1 CAPSULE, DELAYED RELEASE ORAL
Qty: 42 | Refills: 0
Start: 2023-02-03 | End: 2023-03-16

## 2023-02-03 RX ADMIN — Medication 15 MILLIGRAM(S): at 05:54

## 2023-02-03 RX ADMIN — Medication 2000 MILLIGRAM(S): at 02:50

## 2023-02-03 RX ADMIN — Medication 1 PATCH: at 07:20

## 2023-02-03 RX ADMIN — SODIUM CHLORIDE 100 MILLILITER(S): 9 INJECTION INTRAMUSCULAR; INTRAVENOUS; SUBCUTANEOUS at 05:43

## 2023-02-03 RX ADMIN — PANTOPRAZOLE SODIUM 40 MILLIGRAM(S): 20 TABLET, DELAYED RELEASE ORAL at 05:41

## 2023-02-03 RX ADMIN — Medication 15 MILLIGRAM(S): at 05:42

## 2023-02-03 RX ADMIN — MAGNESIUM HYDROXIDE 30 MILLILITER(S): 400 TABLET, CHEWABLE ORAL at 09:54

## 2023-02-03 RX ADMIN — Medication 325 MILLIGRAM(S): at 05:41

## 2023-02-03 NOTE — SBIRT NOTE ADULT - NSSBIRTALCPOSREINDET_GEN_A_CORE
Pt reports she has 1-2 drinks, most Friday's during the summer time. SW informed pt of recommended drinking range for a woman her age and provided positive reinforcement. Pt denies feeling like her drinking is an issue and declined resources from SW.
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
## 2023-02-03 NOTE — CONSULT NOTE ADULT - ASSESSMENT
68 y/o Female with history of  anxiety , neck pain, back pain , melanoma and arthritis  s/p left hip replacement 2020 since then pt c/o multiple dislocations. Pt had surgery / revision in 8/2021 . LAst dislocation in NOv 2022, came in here for elective revision of left hip on 1/6/22 with Dr. Guerrero, she came to the ED with Left hip pain while using toilet this morning and started having 10/10 pain, Imaging done in the ED and was noted to have dislocation again, XR done in the ED shows LEFT femoral head prosthesis admitted under Ortho and is s/p Total revision of hip replacement.     Left hip s/p Total revision of hip replacement post op day 0:     - post op no complications  - VS stable  - abx per ortho   - opiate induced constipation regimen   - encouraging incentive spirometry   -c/w local wound care per ortho   -DVT prophylaxis and Pain meds as per Ortho team   -PT/OT and weight bearing per ortho     Anxiety: on Alprazolam 1 mg  3 times a day, As needed, anxiety, Seroquel 25 mg daily.     HLD: on simvastatin 40 mg once a day (at bedtime)    Patient is high risk for DVT given hx of melanoma, would recommend Lovenox sc for dvt prophylaxis.     Patient is stable from the medicine point of view for discharge pending PT and Ortho eval.

## 2023-02-03 NOTE — PROGRESS NOTE ADULT - SUBJECTIVE AND OBJECTIVE BOX
TRISHA IVAN    [id number]    History: Patient is status post left revision hip arthroplasty on 2/2, POD#1. Patient is doing well. The patient's pain is controlled using the prescribed pain medications. The patient is participating in physical therapy. Denies nausea, vomiting, chest pain, shortness of breath, abdominal pain or fever. No new complaints.                            11.7   5.84  )-----------( 168      ( 03 Feb 2023 05:23 )             35.5       02-03    140  |  107  |  9.3  ----------------------------<  100<H>  4.2   |  25.0  |  0.67    Ca    8.5      03 Feb 2023 05:23          Physical exam: The left hip dressing is clean, dry and intact. No drainage or discharge. No erythema is noted. No blistering. No ecchymosis. The calf is supple nontender. Sensation to light touch is grossly intact distally. +dorsi/plantarflexion/EHL/FHL. No foot drop. 2+ dorsalis pedis pulse. Capillary refill is less than 2 seconds. No cyanosis.    Primary Orthopedic Assessment:  • s/p LEFT POSTERIOR total hip replacement, POD#1      Plan:   • DVT prophylaxis as prescribed, including use of compression devices and ankle pumps  • Continue physical therapy  • Weightbearing as tolerated of the left lower extremity with assistance of a walker  • Incentive spirometry encouraged  • Pain control as clinically indicated  • Posterior hip precautions reviewed with patient  • Discharge planning – anticipated discharge is Home today when cleared by PT and medicine
Patient was seen and examined at approximately 02-02-23 @ 20:10    ORTHO-TOTAL JOINT SERVICE      TRISHA LOVE    [id number]    History: Patient is a 68yFemale status post left posterior total hip revision on 2/2/2023. Patient is doing well. The patient's pain is not controlled using the prescribed pain medications, patient refusing prescribed medication, endorses percocet as the medication controlling pain with previous surgeries. Denies nausea, vomiting, chest pain, shortness of breath, abdominal pain or fever. No new complaints.      T(C): 36.4 (02-02-23 @ 18:56), Max: 36.7 (02-02-23 @ 11:49)  HR: 60 (02-02-23 @ 18:56) (54 - 84)  BP: 154/70 (02-02-23 @ 18:56) (126/71 - 181/105)  RR: 16 (02-02-23 @ 18:56) (15 - 20)  SpO2: 92% (02-02-23 @ 18:56) (92% - 100%)        MEDICATIONS  (STANDING):  aspirin enteric coated 325 milliGRAM(s) Oral two times a day  ceFAZolin  Injectable. 2000 milliGRAM(s) IV Push <User Schedule>  influenza  Vaccine (HIGH DOSE) 0.7 milliLiter(s) IntraMuscular once  ketorolac   Injectable 15 milliGRAM(s) IV Push every 6 hours  multivitamin 1 Tablet(s) Oral daily  pantoprazole    Tablet 40 milliGRAM(s) Oral before breakfast  polyethylene glycol 3350 17 Gram(s) Oral at bedtime  QUEtiapine 25 milliGRAM(s) Oral daily  senna 2 Tablet(s) Oral at bedtime  simvastatin 40 milliGRAM(s) Oral at bedtime  sodium chloride 0.9% Bolus 500 milliLiter(s) IV Bolus once  sodium chloride 0.9%. 1000 milliLiter(s) (100 mL/Hr) IV Continuous <Continuous>  vancomycin  IVPB 1000 milliGRAM(s) IV Intermittent once    MEDICATIONS  (PRN):  ALPRAZolam 1 milliGRAM(s) Oral three times a day PRN anxiety  HYDROmorphone   Tablet 4 milliGRAM(s) Oral every 4 hours PRN Severe Pain (7 - 10)  magnesium hydroxide Suspension 30 milliLiter(s) Oral daily PRN Constipation  ondansetron Injectable 4 milliGRAM(s) IV Push every 6 hours PRN Nausea and/or Vomiting  oxycodone    5 mG/acetaminophen 325 mG 1 Tablet(s) Oral every 4 hours PRN Mild Pain (1 - 3)  oxycodone    5 mG/acetaminophen 325 mG 2 Tablet(s) Oral every 4 hours PRN Moderate Pain (4 - 6)      Physical exam: The left hip dressing is clean, dry and intact. No drainage or discharge. No erythema is noted. No blistering. No ecchymosis. The calf is supple nontender. No calf tenderness. Sensation to light touch is grossly intact distally. Motor function distally is 5/5. No foot drop. 2+ dorsalis pedis pulse. Capillary refill is less than 2 seconds. No cyanosis.    Primary Orthopedic Assessment:  • s/p LEFT POSTERIOR total hip revision    Secondary  Orthopedic Assessment(s):   •     Secondary  Medical Assessment(s):   •     Plan:   • DVT prophylaxis as prescribed ASA 325mg BID, including use of compression devices and ankle pumps  • Continue physical therapy  * Pain medication adjusted  • Weightbearing as tolerated of the right lower extremity with assistance of a walker  • Incentive spirometry encouraged  • Pain control as clinically indicated  • Posterior hip precautions reviewed with patient  • Discharge planning

## 2023-02-08 LAB — SURGICAL PATHOLOGY STUDY: SIGNIFICANT CHANGE UP

## 2023-02-28 ENCOUNTER — APPOINTMENT (OUTPATIENT)
Dept: ORTHOPEDIC SURGERY | Facility: CLINIC | Age: 68
End: 2023-02-28
Payer: MEDICARE

## 2023-02-28 VITALS
DIASTOLIC BLOOD PRESSURE: 70 MMHG | BODY MASS INDEX: 25.61 KG/M2 | WEIGHT: 150 LBS | HEIGHT: 64 IN | SYSTOLIC BLOOD PRESSURE: 110 MMHG

## 2023-02-28 PROCEDURE — 99024 POSTOP FOLLOW-UP VISIT: CPT

## 2023-02-28 PROCEDURE — 73502 X-RAY EXAM HIP UNI 2-3 VIEWS: CPT

## 2023-02-28 RX ORDER — OXYCODONE AND ACETAMINOPHEN 5; 325 MG/1; MG/1
5-325 TABLET ORAL
Qty: 30 | Refills: 0 | Status: ACTIVE | COMMUNITY
Start: 2020-10-29 | End: 1900-01-01

## 2023-02-28 NOTE — HISTORY OF PRESENT ILLNESS
[de-identified] : s/p Revision of left total hip arthroplasty to a constrained acetabular liner and revision femoral head. DOS: 02/02/23 [de-identified] : Patient presents today with  for a postop evaluation of a left hip revision arthroplasty.  The patient underwent a conversion to a constrained liner system approximately 3 weeks ago.  She presents today without use of a walker.  She is using a hip abduction pillow.  She does report that at times she wakes up in the night sleeping on her side and the pillow missing.  She has not had any dislocations since she was last seen.  She is concerned about dislocating because of past experience.  Report of ongoing left knee pain.  She is status post left total knee arthroplasty October 2020.  She believes her knee pain is from the recent dislocations and closed reductions.  She denies of any direct trauma. [de-identified] : X-ray of the left hip was reviewed. Implants are in good position. No signs of loosening or fracture.\par \par X-rays of the pelvis and left hip review.  Constrained liner system and femoral implants are in good position. No signs of loosening or fracture.  No dislocation. [de-identified] : Her incisions well-healed.  There is no sign of infection.  She is walking with a minimally antalgic gait without a cane.  She is neurovascular intact [de-identified] : MARIIA ISTOP - Reference #: [id number]\par \par Patient is doing well status post revision of her left total hip to a constrained liner.  She does understand that this hip is still able to dislocate.  I advised her to continue to follow posterior hip precautions.  She request a refill on Percocet.  A prescription was provided.  We are concerned about her chronic use of opioids.  We have encouraged her to discontinue them as soon as possible.  She will begin outpatient physical therapy

## 2023-03-29 ENCOUNTER — APPOINTMENT (OUTPATIENT)
Dept: ORTHOPEDIC SURGERY | Facility: CLINIC | Age: 68
End: 2023-03-29

## 2023-04-12 NOTE — ED ADULT TRIAGE NOTE - HEIGHT IN INCHES
Commit to sit  Preop Nurse sat down with the patient during preop to provide education on the upcoming procedure and answered all patient questions.       4

## 2023-04-14 NOTE — PATIENT PROFILE ADULT - NSPRONUTRITIONRISK_GEN_A_NUR
Plastic Surgeon Procedure Text (B): After obtaining clear surgical margins the patient was sent to plastics for surgical repair. No indicators present

## 2023-05-08 ENCOUNTER — APPOINTMENT (OUTPATIENT)
Dept: DERMATOLOGY | Facility: CLINIC | Age: 68
End: 2023-05-08

## 2023-08-07 ENCOUNTER — APPOINTMENT (OUTPATIENT)
Dept: ORTHOPEDIC SURGERY | Facility: CLINIC | Age: 68
End: 2023-08-07
Payer: MEDICARE

## 2023-08-07 VITALS — HEIGHT: 64 IN | WEIGHT: 150 LBS | BODY MASS INDEX: 25.61 KG/M2

## 2023-08-07 DIAGNOSIS — M47.812 SPONDYLOSIS W/OUT MYELOPATHY OR RADICULOPATHY, CERVICAL REGION: ICD-10-CM

## 2023-08-07 PROCEDURE — 73502 X-RAY EXAM HIP UNI 2-3 VIEWS: CPT | Mod: LT

## 2023-08-07 PROCEDURE — 99214 OFFICE O/P EST MOD 30 MIN: CPT

## 2023-08-07 PROCEDURE — 73562 X-RAY EXAM OF KNEE 3: CPT | Mod: LT

## 2023-08-07 NOTE — PHYSICAL EXAM
[LE] : Sensory: Intact in bilateral lower extremities [ALL] : dorsalis pedis, posterior tibial, femoral, popliteal, and radial 2+ and symmetric bilaterally [de-identified] : GENERAL APPEARANCE: Well nourished and hydrated, pleasant, alert, and oriented x 3. Appears their stated age.  HEENT: Normocephalic, extraocular eye motion intact. Nasal septum midline. Oral cavity clear. External auditory canal clear.  RESPIRATORY: Breath sounds clear and audible in all lobes. No wheezing, No accessory muscle use. CARDIOVASCULAR: No apparent abnormalities. No lower leg edema. No varicosities. Pedal pulses are palpable. NEUROLOGIC: Sensation is normal, no muscle weakness in the upper or lower extremities. DERMATOLOGIC: No apparent skin lesions, moist, warm, no rash. SPINE: Cervical spine appears normal and moves freely; thoracic spine appears normal and moves freely; lumbosacral spine appears normal and moves freely, normal, nontender. MUSCULOSKELETAL: Hands, wrists, and elbows are normal and move freely, shoulders are normal and move freely.  [de-identified] : Left hip exam shows healed incision with no sign of infection.  Left knee exam shows healed incision with no sign of infection. 0-120 [de-identified] : 3V xray of the revision left hip done in office today and reviewed by Dr. German Guerrero demonstrates s/p revision left hip implants in good positioning with no evidence of wear, loosening, or subsidence.   3V xray of the revision left knee done in office today and reviewed by Dr. German Guerrero demonstrates s/p revision left knee implants in good positioning with no evidence of wear, loosening, or subsidence.

## 2023-08-07 NOTE — DISCUSSION/SUMMARY
[de-identified] : 69 y/o F presents with revision left RITIKA and left TKA on 1/19/21. We reassured the pt that her implants are stable with no evidence of loosening or wear. She should continue to do low impact exercises. We do not recommend a revision surgery for both the hip and the knee. The pt may take over the counter anti-inflammatories as needed. The pt may f/u as needed.

## 2023-08-07 NOTE — REVIEW OF SYSTEMS
[Joint Pain] : joint pain [Joint Stiffness] : joint stiffness [Joint Swelling] : joint swelling [Negative] : Heme/Lymph [FreeTextEntry9] : left hip and knee

## 2023-08-07 NOTE — HISTORY OF PRESENT ILLNESS
[Stable] : stable [3] : a current pain level of 3/10 [1] : a minimum pain level of 1/10 [6] : a maximum pain level of 6/10 [de-identified] : 67 y/o F presents with revision left RITIKA and left TKA on 1/19/21. The pt has been having pain on the knee and hip. Yesterday, she started having worsening pain. She has pain with WB. Regarding the knee, when she tries to get up, she feels her knee kali Inword. The left hip does feel discomfort and unsteadiness. She feels her implants will "pop." She recently had surgery on her neck.

## 2023-08-07 NOTE — END OF VISIT
[FreeTextEntry3] : I, Ashley Avendano, acted solely as a scribe for Dr. German Guerrero on 08/07/2023

## 2023-09-18 NOTE — ED ADULT NURSE NOTE - CAS ELECT INFOMATION PROVIDED
General Call    Contacts         Type Contact Phone/Fax    09/18/2023 02:42 PM CDT Phone (Incoming) MALAIKA Epstein (Self) [phone] (M)          Reason for Call:  Response for question    What are your questions or concerns:  Scheduled to have pelvic US 9/19/23 and she read that you shouldn't get one if you are bleeding. She was scheduled for this due to no menstrual in 5 months. Yesterday she started to have brown discharge, end of menstrual symptoms and the spotting continues. Is wondering on if she should be completed this US or r/s    Date of last appointment with provider: 9/13/23    Could we send this information to you in [employer] or would you prefer to receive a phone call?:   Patient would like to be contacted via [employer]   DC instructions

## 2023-09-19 NOTE — DISCHARGE NOTE PROVIDER - NSDCCPTREATMENT_GEN_ALL_CORE_FT
Attempted to call number, they put me on hold but then was disconnected.   
Caller: Rafaela Vish REDDING    Relationship: Self    Best call back number: [phone]     What medications are you currently taking:   Current Outpatient Medications on File Prior to Visit   Medication Sig Dispense Refill    diazePAM (VALIUM) 5 MG tablet TAKE 1 TABLET BY MOUTH EVERY 12 HOURS AS NEEDED FOR ANXIETY 60 tablet 0    doxepin (SINEquan) 10 MG capsule Take 1-2 tablets PO QHS PRN for sleep 60 capsule 5    fluticasone (FLONASE) 50 MCG/ACT nasal spray SPRAY TWO SPRAYS IN EACH NOSTRIL ONCE DAILY 48 mL 1    Semaglutide-Weight Management (Wegovy) 0.25 MG/0.5ML solution auto-injector Inject 0.25 mg under the skin into the appropriate area as directed 1 (One) Time Per Week. (Patient not taking: Reported on 8/8/2023) 2 mL 0    Synthroid 75 MCG tablet TAKE 1 TABLET BY MOUTH EVERY MORNING WHILE FASTING. WAIT ONE HOUR BEFORE EATING OR TAKING ANY OTHER MEDICATION 30 tablet 2     No current facility-administered medications on file prior to visit.      When did you start taking these medications: HAS NOT     Which medication are you concerned about: WEGOVY    Who prescribed you this medication: DR PHILLIPS    What are your concerns: PATIENT IS REQUESTING DR PHILLIPS CALL IN A NEW PRESCRIPTION FOR THE SEMAGLUTIDE COMPOUND BECAUSE OF THE HIGH COST OF THE PRESCRIPTION FOR WEGOVY.    PLEASE ADVISE PATIENT IF THERE ARE ANY QUESTIONS/CONCERNS      PATIENT WOULD LIKE TO USE:   [facility] - [place], [place] - [address] - [phone]  - [phone] FX     
I spoke with patient, she stated that he pharmacy told her that semaglutide is a compound and it needs to be sent to a compounding pharmacy??  
It's not a compound, it's one drug. I am definitely confused on this one.  
PRINCIPAL PROCEDURE  Procedure: Total revision of hip replacement  Findings and Treatment:

## 2023-11-20 ENCOUNTER — APPOINTMENT (OUTPATIENT)
Dept: ORTHOPEDIC SURGERY | Facility: CLINIC | Age: 68
End: 2023-11-20
Payer: MEDICARE

## 2023-11-20 VITALS — WEIGHT: 150 LBS | BODY MASS INDEX: 25.61 KG/M2 | HEIGHT: 64 IN

## 2023-11-20 DIAGNOSIS — Z96.642 PRESENCE OF LEFT ARTIFICIAL HIP JOINT: ICD-10-CM

## 2023-11-20 PROCEDURE — 99214 OFFICE O/P EST MOD 30 MIN: CPT

## 2024-03-06 NOTE — DISCHARGE NOTE PROVIDER - NSDCFUADDINST_GEN_ALL_CORE_FT
"3/6/2024       RE: Derek Enriquez  [address]  [place] [facility] [address]     Dear Colleague,    Thank you for referring your patient, Derek Enriquez, to the [facility] [place] at [facility]. Please see a copy of my visit note below.    Virtual Visit Details    Type of service:  Video Visit     Originating Location (pt. Location): Home    Distant Location (provider location):  Off-site  Platform used for Video Visit: AmWell   1:45p-1:55p    HPI:  Derek Enriquez is a 61 year old male with gender dysphoria  Patient also has neurogenic bladder and gets bladder botox injections in clinic.  Patient is scheduled for orchiectomy and scrotectomy later this month.  He has hx of MI with MALI placement. Continues to take asa 81 daily.    Exam:  Ht 1.803 m (5' 11\")   Wt 79.4 kg (175 lb)   BMI 24.41 kg/m    Exam:           Constitutional - No apparent distress. Patient of stated age.               Eyes - no redness or discharge.              Respiratory - no cough. no labored breathing              Musculoskeletal - full range of motion in all extremities              Skin - no visible skin discoloration or lesions              Neurological - no tremors              Psychiatric - no anxiety, alert & oriented                Assessment & Plan   Gender Dysphoria  We again reviewed orchiectomy and scrotectomy.  He is approved for surgery.  We discussed operative procedure, closure techniques.  Discussed short period (3 days) of asa81 cessation perioperatively.  Discussed ok to stop estradiol periprocedure.    Cody Stroud MD  Reconstructive Urology  [facility]    " The patient will be seen in the office between 2-3 weeks for wound check.   **Your first post-operative visit has been scheduled prior to your admission. PLEASE CONTACT OFFICE TO CONFIRM THE APPOINTMENT DATE. Tape will be removed at that time.  **  The silver based dressing is to be removed 7 days from the date of surgery (1/26/21).   ** CONTACT THE OFFICE IF THE FOLLOWING DEVELOP:  - the dressing becomes soiled or saturated  - you develop a fever greater that 101F  - the wound becomes red or you develop blistering around the wound  * Patient may shower after post-op day #3 (1/22/21).   * The patient will continue home PT consistent with  total knee replacement protocol. Transition to outpatient PT will occur at the time of the first office visit.   * The patient will practice knee extension exercises regularly to minimize hamstring contraction.   * The patient is FULL weight bearing.  * The patient will continue ASPIRIN for 6 weeks after surgery for blood clot prevention.  * While on aspirin, the patient will take daily omeprazole or other similar medication to protect the stomach from irritation.   * The patient will take OXYCODONE AND TYLENOL for pain control and adjust according to prescription and patient needs. Contact the office if pain increases while taking prescribed pain medications or related concerns develop.  * Celebrex will be taken twice daily for 3 weeks for pain control and prevention of excessive bone growth. Additional prescription may be requested at your office follow-up visit.   * The patient will take Senna S while taking oxycodone to prevent narcotic associated constipation.  Additionally, increase water intake (drink at least 8 glasses of water daily) and try adding fiber to the diet by eating fruits, vegetables and foods that are rich in grains. If constipation is experienced, contact the medical/primary care provider to discuss further treatment options.  * To avoid injury at home:  - continue use of rolling walker until cleared by physical therapist  - have family or friend remove all throw rug or objects in hallways that may present a trip hazard.  - if you experience any dizziness or medical concerns, call your medical doctor or  911.  * The implant may activate metal detection devices.  The patient will be seen in the office between 2-3 weeks for wound check.   **Your first post-operative visit has been scheduled prior to your admission. PLEASE CONTACT OFFICE TO CONFIRM THE APPOINTMENT DATE. Tape will be removed at that time.  **  The silver based dressing is to be removed 7 days from the date of surgery (1/26/21).   ** CONTACT THE OFFICE IF THE FOLLOWING DEVELOP:  - the dressing becomes soiled or saturated  - you develop a fever greater that 101F  - the wound becomes red or you develop blistering around the wound  * Patient may shower after post-op day #3 (1/22/21).   * The patient will continue home PT consistent with  total knee replacement protocol. Transition to outpatient PT will occur at the time of the first office visit.   * The patient will practice knee extension exercises regularly to minimize hamstring contraction.   * The patient is FULL weight bearing.  * The patient will continue ASPIRIN for 6 weeks after surgery for blood clot prevention.  * While on aspirin, the patient will take daily omeprazole or other similar medication to protect the stomach from irritation.   * The patient will take PERCOCET for pain control and adjust according to prescription and patient needs. Contact the office if pain increases while taking prescribed pain medications or related concerns develop.  * Celebrex will be taken twice daily for 3 weeks for pain control and prevention of excessive bone growth. Additional prescription may be requested at your office follow-up visit.   * The patient will take Senna S while taking oxycodone to prevent narcotic associated constipation.  Additionally, increase water intake (drink at least 8 glasses of water daily) and try adding fiber to the diet by eating fruits, vegetables and foods that are rich in grains. If constipation is experienced, contact the medical/primary care provider to discuss further treatment options.  * To avoid injury at home:  - continue use of rolling walker until cleared by physical therapist  - have family or friend remove all throw rug or objects in hallways that may present a trip hazard.  - if you experience any dizziness or medical concerns, call your medical doctor or  911.  * The implant may activate metal detection devices.  The patient will be seen in the office between 2-3 weeks for wound check.   **Your first post-operative visit has been scheduled prior to your admission. PLEASE CONTACT OFFICE TO CONFIRM THE APPOINTMENT DATE. Tape will be removed at that time.  **  The silver based dressing is to be removed 7 days from the date of surgery (1/26/21).   ** CONTACT THE OFFICE IF THE FOLLOWING DEVELOP:  - the dressing becomes soiled or saturated  - you develop a fever greater that 101F  - the wound becomes red or you develop blistering around the wound  * Patient may shower after post-op day #3 (1/22/21).   * The patient will continue home PT consistent with  total knee replacement protocol. Transition to outpatient PT will occur at the time of the first office visit.   * The patient will practice knee extension exercises regularly to minimize hamstring contraction.   * The patient is FULL weight bearing.  * The patient will continue ASPIRIN for 6 weeks after surgery for blood clot prevention.  * While on aspirin, the patient will take daily omeprazole or other similar medication to protect the stomach from irritation.   * The patient will take PERCOCET for pain control and adjust according to prescription and patient needs. Contact the office if pain increases while taking prescribed pain medications or related concerns develop.  * Celebrex will be taken twice daily for 3 weeks for pain control and prevention of excessive bone growth. Additional prescription may be requested at your office follow-up visit.   * The patient will take Senna S while taking oxycodone to prevent narcotic associated constipation.  Additionally, increase water intake (drink at least 8 glasses of water daily) and try adding fiber to the diet by eating fruits, vegetables and foods that are rich in grains. If constipation is experienced, contact the medical/primary care provider to discuss further treatment options.  * To avoid injury at home:  - continue use of rolling walker until cleared by physical therapist  - have family or friend remove all throw rug or objects in hallways that may present a trip hazard.  - if you experience any dizziness or medical concerns, call your medical doctor or  911.  * The implant may activate metal detection devices.   * Additionally, patient will take Duricef 500mg twice a day for 7 days for infection prevention

## 2024-03-18 ENCOUNTER — APPOINTMENT (OUTPATIENT)
Dept: ORTHOPEDIC SURGERY | Facility: CLINIC | Age: 69
End: 2024-03-18
Payer: MEDICARE

## 2024-03-18 VITALS — HEART RATE: 89 BPM | HEIGHT: 64 IN | DIASTOLIC BLOOD PRESSURE: 81 MMHG | SYSTOLIC BLOOD PRESSURE: 120 MMHG

## 2024-03-18 DIAGNOSIS — Z47.1 AFTERCARE FOLLOWING JOINT REPLACEMENT SURGERY: ICD-10-CM

## 2024-03-18 DIAGNOSIS — Z96.652 AFTERCARE FOLLOWING JOINT REPLACEMENT SURGERY: ICD-10-CM

## 2024-03-18 DIAGNOSIS — Z96.642 AFTERCARE FOLLOWING JOINT REPLACEMENT SURGERY: ICD-10-CM

## 2024-03-18 DIAGNOSIS — Z96.649 PRESENCE OF UNSPECIFIED ARTIFICIAL HIP JOINT: ICD-10-CM

## 2024-03-18 DIAGNOSIS — M16.11 UNILATERAL PRIMARY OSTEOARTHRITIS, RIGHT HIP: ICD-10-CM

## 2024-03-18 DIAGNOSIS — Z96.652 PRESENCE OF LEFT ARTIFICIAL KNEE JOINT: ICD-10-CM

## 2024-03-18 PROCEDURE — 73522 X-RAY EXAM HIPS BI 3-4 VIEWS: CPT

## 2024-03-18 PROCEDURE — 73562 X-RAY EXAM OF KNEE 3: CPT | Mod: LT

## 2024-03-18 PROCEDURE — 99214 OFFICE O/P EST MOD 30 MIN: CPT

## 2024-03-18 NOTE — HISTORY OF PRESENT ILLNESS
[Stable] : stable [3] : a current pain level of 3/10 [1] : a minimum pain level of 1/10 [6] : a maximum pain level of 6/10 [de-identified] : 68y/o F presents with left hip pain and left knee pain.   Regarding the left hip, The pt is s/p revision left RITIKA on 2/2/23 from dislocation her primary left hip replacement was on January 6, 19 2021. The pt complains of chronic lateral hip pain that has been chronic. The pt feels immense groin pan which has been effecting her quality of life. The pt asks left hip revision. The pt takes percocent 7.5mg BID by Dr srivastava , PCP.    Regarding the left knee, The pt is s/p left TKA in 2021 and right TKA in 2015. She complains left knee is turning inward she has trouble with activities of daily living. Her pain on the left knee also effects her quality of life as well. She states her pain is effecting her ADLs and is having trouble walking, stairs, exercise, going to the store.

## 2024-03-18 NOTE — END OF VISIT
[FreeTextEntry3] : I, Ashley Avendano, acted solely as a scribe for Dr. German Guerrero on 03/18/2024

## 2024-03-18 NOTE — PHYSICAL EXAM
[LE] : Sensory: Intact in bilateral lower extremities [ALL] : dorsalis pedis, posterior tibial, femoral, popliteal, and radial 2+ and symmetric bilaterally [de-identified] : GENERAL APPEARANCE: Well nourished and hydrated, pleasant, alert, and oriented x 3. Appears their stated age.  HEENT: Normocephalic, extraocular eye motion intact. Nasal septum midline. Oral cavity clear. External auditory canal clear.  RESPIRATORY: Breath sounds clear and audible in all lobes. No wheezing, No accessory muscle use. CARDIOVASCULAR: No apparent abnormalities. No lower leg edema. No varicosities. Pedal pulses are palpable. NEUROLOGIC: Sensation is normal, no muscle weakness in the upper or lower extremities. DERMATOLOGIC: No apparent skin lesions, moist, warm, no rash. SPINE: Cervical spine appears normal and moves freely; thoracic spine appears normal and moves freely; lumbosacral spine appears normal and moves freely, normal, nontender. MUSCULOSKELETAL: Hands, wrists, and elbows are normal and move freely, shoulders are normal and move freely.  [de-identified] : Left hip exam shows healed incision with no sign of infection.  She ambulates well without using walking assistive device no sign of infection no groin pain with straight leg raise lateral hip pain with hip range of motion Left knee exam shows healed incision with no sign of infection. 0-120 [de-identified] :  3V xray of the left knee done in the office today and reviewed and demonstrates s/p left TKA  implants in good positioning with no evidence of wear, loosening, or subsidence.    5 views of bilateral hip x-ray shows demonstrates s/p revision left hip implants in good positioning with no evidence of wear, loosening, or subsidence. mild right hip o.a

## 2024-03-18 NOTE — DISCUSSION/SUMMARY
[Medication Risks Reviewed] : Medication risks reviewed [Surgical risks reviewed] : Surgical risks reviewed [de-identified] : 70 y/o F presents with chronic left hip and left knee pain. The pt is s/p revision left RITIKA on 2/2/23 from dislocation her primary revision left RITIKA on 8/24/21. The pt is s/p left TKA on 1/19/21. The nature of her condition and treatment options were discussed in detail. The pt complains of chronic lateral left hip and knee pain that has been chronic. The pt feels immense groin pan which has been affecting her quality of life. The pt asks for a revision left RITIKA and left TKA. We had an extensive discussion that the pt is not a candidate for a revision left RITIKA or TKA. She shows no evidence of infection or failure. The pt understands that risk of infection, bone loss, and chronic pain increases after repeat surgery. We recommend PT for the left hip and left knee to help with strengthening. She should continue to do low impact exercises. The pt may take over the counter anti-inflammatories as needed. The pt will f/u PRN.

## 2024-03-19 NOTE — PATIENT PROFILE ADULT - IS THERE A SUSPICION OF ABUSE/NEGLIGENCE?
(1) Mild-to-moderate dysarthria; patient slurs at least some words and, at worst, can be understood with some difficulty Yes no

## 2024-03-30 NOTE — PROGRESS NOTE ADULT - SUBJECTIVE AND OBJECTIVE BOX
Pelvis & hip films reviewed. Implants are in appropriate position. No fracture or dislocation noted. Patient is WBAT of the surgical extremity.  show [phone]

## 2025-01-10 ENCOUNTER — APPOINTMENT (OUTPATIENT)
Dept: DERMATOLOGY | Facility: CLINIC | Age: 70
End: 2025-01-10

## 2025-01-21 NOTE — DISCHARGE NOTE PROVIDER - NSDCQMCOGNITION_NEU_ALL_CORE
No difficulties Patient requests all Lab, Cardiology, and Radiology Results on their Discharge Instructions

## 2025-02-03 NOTE — ED ADULT NURSE NOTE - NSFALLRSKASSESSDT_ED_ALL_ED
Caller: Parent of Child 17 Years or Younger- Name: Cathi    Reason for Call: Other-  Patient's mother called looking to schedule multiple appointments. She is asking for scheduling tickets to be sent for patient. Both virtual and in office so that they can find what works best.     17-Apr-2021 16:05

## 2025-03-26 ENCOUNTER — APPOINTMENT (OUTPATIENT)
Dept: ORTHOPEDIC SURGERY | Facility: CLINIC | Age: 70
End: 2025-03-26
Payer: MEDICARE

## 2025-03-26 VITALS
BODY MASS INDEX: 25.61 KG/M2 | SYSTOLIC BLOOD PRESSURE: 117 MMHG | WEIGHT: 150 LBS | DIASTOLIC BLOOD PRESSURE: 77 MMHG | HEIGHT: 64 IN

## 2025-03-26 DIAGNOSIS — T84.84XA PAIN DUE TO INTERNAL ORTHOPEDIC PROSTHETIC DEVICES, IMPLANTS AND GRAFTS, INITIAL ENCOUNTER: ICD-10-CM

## 2025-03-26 DIAGNOSIS — Z96.649 PAIN DUE TO INTERNAL ORTHOPEDIC PROSTHETIC DEVICES, IMPLANTS AND GRAFTS, INITIAL ENCOUNTER: ICD-10-CM

## 2025-03-26 DIAGNOSIS — Z96.642 AFTERCARE FOLLOWING JOINT REPLACEMENT SURGERY: ICD-10-CM

## 2025-03-26 DIAGNOSIS — Z47.1 AFTERCARE FOLLOWING JOINT REPLACEMENT SURGERY: ICD-10-CM

## 2025-03-26 DIAGNOSIS — Z96.652 AFTERCARE FOLLOWING JOINT REPLACEMENT SURGERY: ICD-10-CM

## 2025-03-26 PROCEDURE — G2211 COMPLEX E/M VISIT ADD ON: CPT

## 2025-03-26 PROCEDURE — 99214 OFFICE O/P EST MOD 30 MIN: CPT

## 2025-03-26 PROCEDURE — 73562 X-RAY EXAM OF KNEE 3: CPT | Mod: LT

## 2025-05-27 ENCOUNTER — APPOINTMENT (OUTPATIENT)
Dept: DERMATOLOGY | Facility: CLINIC | Age: 70
End: 2025-05-27
Payer: MEDICARE

## 2025-05-27 PROCEDURE — 99213 OFFICE O/P EST LOW 20 MIN: CPT | Mod: 25

## 2025-05-27 PROCEDURE — 17000 DESTRUCT PREMALG LESION: CPT

## 2025-05-27 PROCEDURE — 17003 DESTRUCT PREMALG LES 2-14: CPT

## 2025-06-30 ENCOUNTER — APPOINTMENT (OUTPATIENT)
Dept: RADIOLOGY | Facility: CLINIC | Age: 70
End: 2025-06-30
Payer: MEDICARE

## 2025-06-30 ENCOUNTER — OUTPATIENT (OUTPATIENT)
Dept: OUTPATIENT SERVICES | Facility: HOSPITAL | Age: 70
LOS: 1 days | End: 2025-06-30
Payer: MEDICARE

## 2025-06-30 DIAGNOSIS — Z98.89 OTHER SPECIFIED POSTPROCEDURAL STATES: Chronic | ICD-10-CM

## 2025-06-30 DIAGNOSIS — Z96.659 PRESENCE OF UNSPECIFIED ARTIFICIAL KNEE JOINT: Chronic | ICD-10-CM

## 2025-06-30 DIAGNOSIS — Z98.890 OTHER SPECIFIED POSTPROCEDURAL STATES: Chronic | ICD-10-CM

## 2025-06-30 DIAGNOSIS — Q21.12 PATENT FORAMEN OVALE: Chronic | ICD-10-CM

## 2025-06-30 DIAGNOSIS — Z96.642 PRESENCE OF LEFT ARTIFICIAL HIP JOINT: Chronic | ICD-10-CM

## 2025-06-30 DIAGNOSIS — Z00.00 ENCOUNTER FOR GENERAL ADULT MEDICAL EXAMINATION WITHOUT ABNORMAL FINDINGS: ICD-10-CM

## 2025-06-30 DIAGNOSIS — L05.91 PILONIDAL CYST WITHOUT ABSCESS: Chronic | ICD-10-CM

## 2025-06-30 PROCEDURE — 73030 X-RAY EXAM OF SHOULDER: CPT

## 2025-06-30 PROCEDURE — 73030 X-RAY EXAM OF SHOULDER: CPT | Mod: 26,50

## 2025-07-09 NOTE — PROGRESS NOTE ADULT - PROBLEM SELECTOR PLAN 4
- Levels elevated during last lab check.  - Encourage regular exercise to manage cholesterol.  - Recheck blood work today.  - Advise dietary intake monitoring, particularly fats and red meat.   C/W home pain meds, ibuprofen and oxycodone  F/U outpatient

## 2025-07-15 ENCOUNTER — OUTPATIENT (OUTPATIENT)
Dept: OUTPATIENT SERVICES | Facility: HOSPITAL | Age: 70
LOS: 1 days | End: 2025-07-15
Payer: MEDICARE

## 2025-07-15 ENCOUNTER — TRANSCRIPTION ENCOUNTER (OUTPATIENT)
Age: 70
End: 2025-07-15

## 2025-07-15 DIAGNOSIS — Z96.659 PRESENCE OF UNSPECIFIED ARTIFICIAL KNEE JOINT: Chronic | ICD-10-CM

## 2025-07-15 DIAGNOSIS — Z98.89 OTHER SPECIFIED POSTPROCEDURAL STATES: Chronic | ICD-10-CM

## 2025-07-15 DIAGNOSIS — Z96.642 PRESENCE OF LEFT ARTIFICIAL HIP JOINT: Chronic | ICD-10-CM

## 2025-07-15 DIAGNOSIS — Q21.12 PATENT FORAMEN OVALE: Chronic | ICD-10-CM

## 2025-07-15 DIAGNOSIS — Z98.890 OTHER SPECIFIED POSTPROCEDURAL STATES: Chronic | ICD-10-CM

## 2025-07-15 DIAGNOSIS — L05.91 PILONIDAL CYST WITHOUT ABSCESS: Chronic | ICD-10-CM

## 2025-07-15 DIAGNOSIS — M47.812 SPONDYLOSIS WITHOUT MYELOPATHY OR RADICULOPATHY, CERVICAL REGION: ICD-10-CM

## 2025-07-15 PROCEDURE — 64491 INJ PARAVERT F JNT C/T 2 LEV: CPT | Mod: RT

## 2025-07-15 PROCEDURE — 76000 FLUOROSCOPY <1 HR PHYS/QHP: CPT

## 2025-07-15 PROCEDURE — 64490 INJ PARAVERT F JNT C/T 1 LEV: CPT | Mod: 50

## (undated) DEVICE — ELCTR AQUAMANTYS BIPOLAR SEALER 6.0

## (undated) DEVICE — SOL INJ NS 0.9% 100ML

## (undated) DEVICE — SAW BLADE ZIMMER RECIPROCATING SINGLE SIDED 10X70X1.19MM

## (undated) DEVICE — GLV 8 PROTEXIS ORTHO (CREAM)

## (undated) DEVICE — SOL IRR POUR H2O 1000ML

## (undated) DEVICE — DRAPE 3/4 SHEET 52X76"

## (undated) DEVICE — SOL BETADINE POUCH 0.75OZ STERILE

## (undated) DEVICE — DRSG ACE BANDAGE 6"

## (undated) DEVICE — ZIMMER MIXING BOWL

## (undated) DEVICE — PACK TOTAL KNEE

## (undated) DEVICE — ELCTR STRYKER NEPTUNE SMOKE EVACUATION PENCIL (GREEN)

## (undated) DEVICE — SUT VICRYL 2-0 27" CT-2 UNDYED

## (undated) DEVICE — ZIMMER BLADE MRI OSTEOTOME 8MM X 3"

## (undated) DEVICE — SOL IRR POUR NS 0.9% 1000ML

## (undated) DEVICE — PACK TOTAL HIP

## (undated) DEVICE — SAW BLADE STRYKER SAGITTAL DUAL CUT 75X18X1.27MM

## (undated) DEVICE — SYR LUER LOK 50CC

## (undated) DEVICE — Device

## (undated) DEVICE — SUT VICRYL 0 18" CT-1 UNDYED (POP-OFF)

## (undated) DEVICE — NDL SPINAL 18G X 3.5" (PINK)

## (undated) DEVICE — WARMING BLANKET UPPER ADULT

## (undated) DEVICE — SAW BLADE ZIMMER RECIPROCATING DOUBLE SIDED 12.5X96X1.19MM

## (undated) DEVICE — VENODYNE/SCD SLEEVE CALF MEDIUM

## (undated) DEVICE — HOOD T5 PEELAWAY

## (undated) DEVICE — GOWN XL W TOWEL

## (undated) DEVICE — VISITEC 4X4

## (undated) DEVICE — DRSG DERMABOND PRINEO 60CM

## (undated) DEVICE — SUT HEWSON RETRIEVER

## (undated) DEVICE — FOLEY TRAY 16FR 5CC LF UMETER CLOSED

## (undated) DEVICE — DRAPE XL SHEET 77X98"

## (undated) DEVICE — SUT MONOCRYL 3-0 27" PS-2 UNDYED

## (undated) DEVICE — DRAPE 1/2 SHEET 40X57"

## (undated) DEVICE — ZIMMER BLUE CURETTE

## (undated) DEVICE — SUT ETHIBOND 5 4-30" CCS

## (undated) DEVICE — DRAPE HIP W POUCHES 87X115X134"

## (undated) DEVICE — POSITIONER FOAM ABDUCTION PILLOW MED (PINK)

## (undated) DEVICE — DRAPE TOWEL BLUE 17" X 24"

## (undated) DEVICE — SYR LUER LOK 20CC

## (undated) DEVICE — DRAPE U LONG 47X70"

## (undated) DEVICE — DRILL BIT BRASSELER TWIST 2.4MM 3/32 X 5"

## (undated) DEVICE — NDL HYPO SAFE 20G X 1.5" (YELLOW)